# Patient Record
Sex: MALE | Race: BLACK OR AFRICAN AMERICAN | NOT HISPANIC OR LATINO | Employment: OTHER | URBAN - METROPOLITAN AREA
[De-identification: names, ages, dates, MRNs, and addresses within clinical notes are randomized per-mention and may not be internally consistent; named-entity substitution may affect disease eponyms.]

---

## 2017-01-11 RX ORDER — METHOCARBAMOL 500 MG/1
TABLET, FILM COATED ORAL
Qty: 90 TABLET | Refills: 6 | Status: SHIPPED | OUTPATIENT
Start: 2017-01-11 | End: 2023-09-27

## 2017-01-12 ENCOUNTER — OFFICE VISIT (OUTPATIENT)
Dept: PHYSICAL MEDICINE AND REHAB | Facility: CLINIC | Age: 82
End: 2017-01-12
Payer: COMMERCIAL

## 2017-01-12 VITALS
BODY MASS INDEX: 28.04 KG/M2 | HEART RATE: 58 BPM | DIASTOLIC BLOOD PRESSURE: 70 MMHG | WEIGHT: 185 LBS | SYSTOLIC BLOOD PRESSURE: 145 MMHG | HEIGHT: 68 IN

## 2017-01-12 DIAGNOSIS — M47.26 OSTEOARTHRITIS OF SPINE WITH RADICULOPATHY, LUMBAR REGION: Primary | ICD-10-CM

## 2017-01-12 DIAGNOSIS — M47.12 OSTEOARTHRITIS OF CERVICAL SPINE WITH MYELOPATHY: ICD-10-CM

## 2017-01-12 DIAGNOSIS — M70.61 TROCHANTERIC BURSITIS OF RIGHT HIP: ICD-10-CM

## 2017-01-12 PROCEDURE — 20610 DRAIN/INJ JOINT/BURSA W/O US: CPT | Mod: RT,S$GLB,, | Performed by: PHYSICAL MEDICINE & REHABILITATION

## 2017-01-12 PROCEDURE — 1125F AMNT PAIN NOTED PAIN PRSNT: CPT | Mod: S$GLB,,, | Performed by: PHYSICAL MEDICINE & REHABILITATION

## 2017-01-12 PROCEDURE — 99999 PR PBB SHADOW E&M-EST. PATIENT-LVL III: CPT | Mod: PBBFAC,,, | Performed by: PHYSICAL MEDICINE & REHABILITATION

## 2017-01-12 PROCEDURE — 1157F ADVNC CARE PLAN IN RCRD: CPT | Mod: S$GLB,,, | Performed by: PHYSICAL MEDICINE & REHABILITATION

## 2017-01-12 PROCEDURE — 1159F MED LIST DOCD IN RCRD: CPT | Mod: S$GLB,,, | Performed by: PHYSICAL MEDICINE & REHABILITATION

## 2017-01-12 PROCEDURE — 3077F SYST BP >= 140 MM HG: CPT | Mod: S$GLB,,, | Performed by: PHYSICAL MEDICINE & REHABILITATION

## 2017-01-12 PROCEDURE — 1160F RVW MEDS BY RX/DR IN RCRD: CPT | Mod: S$GLB,,, | Performed by: PHYSICAL MEDICINE & REHABILITATION

## 2017-01-12 PROCEDURE — 99214 OFFICE O/P EST MOD 30 MIN: CPT | Mod: 25,S$GLB,, | Performed by: PHYSICAL MEDICINE & REHABILITATION

## 2017-01-12 PROCEDURE — 3078F DIAST BP <80 MM HG: CPT | Mod: S$GLB,,, | Performed by: PHYSICAL MEDICINE & REHABILITATION

## 2017-01-12 RX ORDER — TRIAMCINOLONE ACETONIDE 40 MG/ML
60 INJECTION, SUSPENSION INTRA-ARTICULAR; INTRAMUSCULAR
Status: COMPLETED | OUTPATIENT
Start: 2017-01-12 | End: 2017-01-12

## 2017-01-12 RX ADMIN — TRIAMCINOLONE ACETONIDE 60 MG: 40 INJECTION, SUSPENSION INTRA-ARTICULAR; INTRAMUSCULAR at 02:01

## 2017-01-12 NOTE — MR AVS SNAPSHOT
[facility]  [address]  [facility] [address]  Phone: [phone]                  Lon Chin Jr.   2017 10:00 AM   Office Visit    Description:  Male : 2/15/1933   Provider:  Lui Correa MD   Department:  [facility]           Diagnoses this Visit        Comments    Osteoarthritis of spine with radiculopathy, lumbar region    -  Primary     Osteoarthritis of cervical spine with myelopathy                To Do List           Goals (5 Years of Data)     None      Follow-Up and Disposition     Return in about 6 months (around 2017) for 40 mins.      [facility]     [facility] Care [facility] -  Assistance  Registered nurses in the [facility] provide clinical advisement, health education, appointment booking, and other advisory services.  Call for this free service at [phone].             Medications           Message regarding Medications     Verify the changes and/or additions to your medication regime listed below are the same as discussed with your clinician today.  If any of these changes or additions are incorrect, please notify your healthcare provider.             Verify that the below list of medications is an accurate representation of the medications you are currently taking.  If none reported, the list may be blank. If incorrect, please contact your healthcare provider. Carry this list with you in case of emergency.           Current Medications     acetaminophen-codeine 300-30mg (TYLENOL #3) 300-30 mg Tab Take 1 tablet (30 mg of codeine total) by mouth 3 (three) times daily as needed.    atorvastatin (LIPITOR) 20 MG tablet Take 1 tablet (20 mg total) by mouth once daily.    clopidogrel (PLAVIX) 75 mg tablet Take 75 mg by mouth once daily.    docusate sodium (COLACE) 100 MG capsule Take 1 capsule (100 mg total) by mouth 2 (two) times daily.    gabapentin (NEURONTIN) 300 MG capsule Take 1  "capsule Q AM + 1 Q Day in the early afternoon + 2 Q HS    methocarbamol (ROBAXIN) 500 MG Tab Take 1-2 tablets TID PRN (muscle spasms)    multivitamin capsule Take 1 capsule by mouth once daily.    nebivolol (BYSTOLIC) 10 MG Tab Take 1 tablet (10 mg total) by mouth once daily.    nifedipine (PROCARDIA-XL) 60 MG (OSM) 24 hr tablet Take 1 tablet (60 mg total) by mouth once daily.    olmesartan-hydrochlorothiazide (BENICAR HCT) 40-12.5 mg Tab Take 1 tablet by mouth once daily.    potassium chloride SA (KLOR-CON M20) 20 MEQ tablet Take 1 tablet (20 mEq total) by mouth once daily.    predniSONE (DELTASONE) 10 MG tablet Take 3 per day for 4 days then 2 per day for 4 days then 1 per day for 3 days then 1/2 per day for 4 days.  #25    predniSONE (DELTASONE) 10 MG tablet Take 3 per day for 4 days then 2 per day for 4 days then 1 per day for 3 days then 1/2 per day for 4 days.  #25    saw palmetto 500 MG capsule Take 500 mg by mouth once daily.           Clinical Reference Information           Vital Signs - Last Recorded  Most recent update: 1/12/2017 10:15 AM by Chantelle Mcclellan MA    BP Pulse Ht Wt BMI    (!) 145/70 (!) 58 5' 8" (1.727 m) 83.9 kg (185 lb) 28.13 kg/m2      Blood Pressure          Most Recent Value    BP  (!)  145/70      Allergies as of 1/12/2017     Gabapentin (Bulk)    Prednisone      Immunizations Administered on Date of Encounter - 1/12/2017     None      Orders Placed During Today's Visit     Future Labs/Procedures Expected by Expires    IR SI Joint Injection Bilat w/Image  1/12/2017 1/12/2018      "

## 2017-01-12 NOTE — PROGRESS NOTES
"Subjective:       Patient ID: Lon Chin Jr. is a 83 y.o. male.    Chief Complaint: No chief complaint on file.    HPI Comments: This pleasant 84yo male is followed for:    -- LBP which is increased with prolonged standing and walking distances.  Very stiff in AM.  Left thigh goes numb intermittently involving the anterior and lateral thigh -- wakens him from sleep.  He stts this is a longstanding problem and that in the past he has had some relief for a few weeks with a steroid injection IM done by this PCP.  A Medrol dosepak did not provide much relief.  He has a h/o stomach ulcers taking prednisone.   He had a left hip injxn done 12/30/13 which substantially relieved the left thigh numbness.  He is s/p L4-5 fusion in 1997.  He takes Tylenol #3 usually 2 times per day, sometimes TID, rx by his PCP.  He was sent to OP PT which did not help his pain.  He was referred for bilateral L4-5 ESIs done on 4/2/14 which did not relieve his pain at all.  He was given a rx for a prednisone taper on 9/2/14 which did help the LBP and was also given a rx for MS Contin 30mg BID which he feels has been a big help with the pain.  He is taking it only PRN (#60 lasts about 3 months) but still takes Tylenol #3 most days.      He had an MRI done 7/10/14 which showed mild stenosis & moderate left foraminal stenosis at L2-3, moderate bilateral foraminal stenosis at L3-4 and Grade 1 anterolisthesis of L4 on L5.       -- R CVA on 4/1.  He has done very well in recovery and ambulates using a SC.      -- neck pain which is longstanding but which he considers 2/2 the LBP.      -- left hip bursitis.  The pt received an injxn on 9/2/14 which resolved the pain.          Today the pt's CC is "my back and my hip".   He stts the right SI injxn done 6/21/16 did provide some relief of his pain for awhile.  He is c/o right side LBP (points lateral to L5-S1 facet).  He stts at times (every day) the pain radiates up/down his spine.  He has pain with " "going from sitting to standing and particularly with standing for prolonged periods and with walking distances.  He does get some relief with Tylenol #3.  On days when his pain is increased he gets relief with MS Contin 30mg which he takes very infrequently due to concern about dependence.  He continues to c/o limited endurance with walking associated with "dragging my left leg" at times.  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  He also walks to the mailbox and walks down his block but he stopped walking 2 blocks because he was afraid of the left leg weakness.                                              Review of Systems   Constitutional: Negative for appetite change and fatigue.   Eyes: Negative for visual disturbance.   Respiratory: Negative for shortness of breath.    Cardiovascular: Negative for chest pain.   Gastrointestinal: Negative for constipation and diarrhea.   Genitourinary: Negative for dysuria, frequency and urgency.   Musculoskeletal: Positive for back pain, gait problem and neck pain. Negative for arthralgias, joint swelling, myalgias and neck stiffness.   Neurological: Negative for dizziness, tremors, weakness, numbness and headaches.   Psychiatric/Behavioral: Negative for dysphoric mood.   All other systems reviewed and are negative.      Objective:      Physical Exam   Musculoskeletal: Normal range of motion.   Neurological: He has normal strength.   Skin: No rash noted.       Assessment:       1. Osteoarthritis of spine with radiculopathy, lumbar region -- the pt did get some relief of his LBP with the SI injxn so I have referred him back to IR for bilateral SI injxns which I hope will provide further relief.  He may continue the above regimen.  He is using a RW and I agree with that.     2. Osteoarthritis of cervical spine with myelopathy -- stable   3. Trochanteric bursitis of right hip -- I have offered an injxn and he has " accepted.  Hip Injection:    The potential risks were explained and the patient consented to proceed.  After identifying the right side the patient was placed in a left side-lying position.  Using palpation and PROM the greater trochanter was identified.  A 25G x 1.5in needle was introduced to the greater trochanter and 3cc 0.5% Sensorcaine was deposited followed by 1.5cc (60mg) Kenalog.  There were no complications.  The patient reports partial relief of the pain.      The pt does not report significant relief of his hip pain.  I suspect this may be related to his lumbar spine dz.  I asked him to perform right piriformis stretches (he is familiar with this and does it on the left side).           Plan:       RTC 6 mos 40 mins.  More than 25 mins was spent with the patient with more than half that time used to discuss the pt's diagnoses, interventions and treatment plan.

## 2017-01-18 ENCOUNTER — TELEPHONE (OUTPATIENT)
Dept: INTERVENTIONAL RADIOLOGY/VASCULAR | Facility: HOSPITAL | Age: 82
End: 2017-01-18

## 2017-01-18 NOTE — TELEPHONE ENCOUNTER
Phone call (message x2 )   Arrival time-    1030      Allergies reviewed  Directions given  Instructed to take meds in AM  Has Ride (instructed )                        Anti coags (plavix held? )

## 2017-01-19 ENCOUNTER — HOSPITAL ENCOUNTER (OUTPATIENT)
Dept: INTERVENTIONAL RADIOLOGY/VASCULAR | Facility: HOSPITAL | Age: 82
Discharge: HOME | End: 2017-01-19
Attending: PHYSICAL MEDICINE & REHABILITATION
Payer: COMMERCIAL

## 2017-01-19 VITALS — OXYGEN SATURATION: 99 % | DIASTOLIC BLOOD PRESSURE: 65 MMHG | SYSTOLIC BLOOD PRESSURE: 139 MMHG | HEART RATE: 58 BPM

## 2017-01-19 DIAGNOSIS — M47.26 OSTEOARTHRITIS OF SPINE WITH RADICULOPATHY, LUMBAR REGION: ICD-10-CM

## 2017-01-19 PROCEDURE — 27096 INJECT SACROILIAC JOINT: CPT | Mod: 50,,, | Performed by: RADIOLOGY

## 2017-01-19 PROCEDURE — 27200940 IR SI JOINT INJECTION BILAT W/IMAGE

## 2017-01-19 NOTE — PROCEDURES
Radiology Post-Procedure Note    Pre Op Diagnosis: LBP    Post Op Diagnosis: Same    Procedure: SI joint injection bilaterally    Procedure performed by: Amna     Written Informed Consent Obtained: Yes    Specimen Removed: NO    Estimated Blood Loss: Minimal    Findings: Successful SI joint injection bilaterally    Level injected: SI joint injection bilaterally  Needle used: 22 gauge  Dose:  80 mg Depo-methylprednisolone   2 mL Lidocaine 1% MPF    Patient tolerated procedure well.    SHAUNA FREY  PGY-II Radiology Resident  [facility] [facility]  Pager: [phone]

## 2017-01-19 NOTE — PROGRESS NOTES
Patient ambulated to room 188 for SI joint injection, AAO, no s/s of distress, Dr. Cano at the bedside for consent.

## 2017-01-19 NOTE — IP AVS SNAPSHOT
[facility]  [address]  [facility] [address]  Phone: [phone]           Patient Discharge Instructions     Our goal is to set you up for success. This packet includes information on your condition, medications, and your home care. It will help you to care for yourself so you don't get sicker and need to go back to the hospital.     Please ask your nurse if you have any questions.        There are many details to remember when preparing to leave the hospital. Here is what you will need to do:    1. Take your medicine. If you are prescribed medications, review your Medication List in the following pages. You may have new medications to  at the pharmacy and others that you'll need to stop taking. Review the instructions for how and when to take your medications. Talk with your doctor or nurses if you are unsure of what to do.     2. Go to your follow-up appointments. Specific follow-up information is listed in the following pages. Your may be contacted by a transition nurse or clinical provider about future appointments. Be sure we have all of the phone numbers to reach you, if needed. Please contact your provider's office if you are unable to make an appointment.     3. Watch for warning signs. Your doctor or nurse will give you detailed warning signs to watch for and when to call for assistance. These instructions may also include educational information about your condition. If you experience any of warning signs to your health, call your doctor.               [facility]  Unless otherwise directed by your provider, please contact [facility], our nurse care line that is available for 24/7 assistance.     [phone] (toll-free)    Registered nurses in the [facility] provide clinical advisement, health education, appointment booking, and other advisory services.                    ** Verify the list of medication(s) below is accurate and up  to date. Carry this with you in case of emergency. If your medications have changed, please notify your healthcare provider.             Medication List      TAKE these medications        Additional Info                      acetaminophen-codeine 300-30mg 300-30 mg Tab   Commonly known as:  TYLENOL #3   Quantity:  90 tablet   Refills:  2   Dose:  30 mg of codeine    Instructions:  Take 1 tablet (30 mg of codeine total) by mouth 3 (three) times daily as needed.     Begin Date    AM    Noon    PM    Bedtime       atorvastatin 20 MG tablet   Commonly known as:  LIPITOR   Quantity:  30 tablet   Refills:  1   Dose:  20 mg    Instructions:  Take 1 tablet (20 mg total) by mouth once daily.     Begin Date    AM    Noon    PM    Bedtime       clopidogrel 75 mg tablet   Commonly known as:  PLAVIX   Refills:  0   Dose:  75 mg    Instructions:  Take 75 mg by mouth once daily.     Begin Date    AM    Noon    PM    Bedtime       docusate sodium 100 MG capsule   Commonly known as:  COLACE   Quantity:  60 capsule   Refills:  2   Dose:  100 mg    Instructions:  Take 1 capsule (100 mg total) by mouth 2 (two) times daily.     Begin Date    AM    Noon    PM    Bedtime       gabapentin 300 MG capsule   Commonly known as:  NEURONTIN   Quantity:  120 capsule   Refills:  6    Instructions:  Take 1 capsule Q AM + 1 Q Day in the early afternoon + 2 Q HS     Begin Date    AM    Noon    PM    Bedtime       methocarbamol 500 MG Tab   Commonly known as:  ROBAXIN   Quantity:  90 tablet   Refills:  6    Instructions:  Take 1-2 tablets TID PRN (muscle spasms)     Begin Date    AM    Noon    PM    Bedtime       multivitamin capsule   Refills:  0   Dose:  1 capsule    Instructions:  Take 1 capsule by mouth once daily.     Begin Date    AM    Noon    PM    Bedtime       nebivolol 10 MG Tab   Commonly known as:  BYSTOLIC   Quantity:  30 tablet   Refills:  11   Dose:  10 mg    Instructions:  Take 1 tablet (10 mg total) by mouth once daily.     Begin  Date    AM    Noon    PM    Bedtime       nifedipine 60 MG (OSM) 24 hr tablet   Commonly known as:  PROCARDIA-XL   Quantity:  30 tablet   Refills:  11   Dose:  60 mg    Instructions:  Take 1 tablet (60 mg total) by mouth once daily.     Begin Date    AM    Noon    PM    Bedtime       olmesartan-hydrochlorothiazide 40-12.5 mg Tab   Commonly known as:  BENICAR HCT   Refills:  0   Dose:  1 tablet    Instructions:  Take 1 tablet by mouth once daily.     Begin Date    AM    Noon    PM    Bedtime       potassium chloride SA 20 MEQ tablet   Commonly known as:  KLOR-CON M20   Quantity:  30 tablet   Refills:  11   Dose:  20 mEq    Instructions:  Take 1 tablet (20 mEq total) by mouth once daily.     Begin Date    AM    Noon    PM    Bedtime       * predniSONE 10 MG tablet   Commonly known as:  DELTASONE   Quantity:  25 tablet   Refills:  0    Instructions:  Take 3 per day for 4 days then 2 per day for 4 days then 1 per day for 3 days then 1/2 per day for 4 days.  #25     Begin Date    AM    Noon    PM    Bedtime       * predniSONE 10 MG tablet   Commonly known as:  DELTASONE   Quantity:  25 tablet   Refills:  0    Instructions:  Take 3 per day for 4 days then 2 per day for 4 days then 1 per day for 3 days then 1/2 per day for 4 days.  #25     Begin Date    AM    Noon    PM    Bedtime       saw palmetto 500 MG capsule   Refills:  0   Dose:  500 mg    Instructions:  Take 500 mg by mouth once daily.     Begin Date    AM    Noon    PM    Bedtime       * Notice:  This list has 2 medication(s) that are the same as other medications prescribed for you. Read the directions carefully, and ask your doctor or other care provider to review them with you.               Please bring to all follow up appointments:    1. A copy of your discharge instructions.  2. All medicines you are currently taking in their original bottles.  3. Identification and insurance card.    Please arrive 15 minutes ahead of scheduled appointment time.    Please  call 24 hours in advance if you must reschedule your appointment and/or time.          Discharge References/Attachments     INJECTION, LUMBAR EPIDURAL: RECOVERY AT HOME (ENGLISH)        Admission Information     Date & Time Provider Department CSN    1/19/2017 10:30 AM Lui Correa MD [facility] [id number]      Care Providers     Provider Role Specialty Primary office phone    Lui Correa MD Attending Provider [facility] [phone]      Your Vitals Were     BP Pulse SpO2             139/65 58 99%         Recent Lab Values        4/1/2013                           7:02 AM           A1C 5.9                       Allergies as of 1/19/2017        Reactions    Gabapentin (Bulk) Other (See Comments)    Severe constipation    Prednisone       Advance Directives     An advance directive is a document which, in the event you are no longer able to make decisions for yourself, tells your healthcare team what kind of treatment you do or do not want to receive, or who you would like to make those decisions for you.  If you do not currently have an advance directive, [facility] encourages you to create one.  For more information call:  [phone] ([phone]), [phone] ([phone]),  or log on to [email or web address].        Smoking Cessation     If you would like to quit smoking:   You may be eligible for free services if you are a [place] resident and started smoking cigarettes before September 1, 1988.  Call the Smoking Cessation Trust (SCT) toll free at (249) 477-8666 or (767) 988-1876.   Call 1-800-QUIT-NOW if you do not meet the above criteria.            Language Assistance Services     ATTENTION: Language assistance services are available, free of charge. Please call [phone].      ATENCIÓN: Si habla español, tiene a quan disposición servicios gratuitos de asistencia lingüística. Llame al [phone].     CHÚ Ý: N?u b?n nói Ti?ng Vi?t, có  các d?ch v? h? tr? ngôn ng? mi?n phí dành cho b?n. G?i s? [phone].        Stroke Education               [facility] complies with applicable Federal civil rights laws and does not discriminate on the basis of race, color, national origin, age, disability, or sex.

## 2017-01-19 NOTE — H&P
Radiology History & Physical      SUBJECTIVE:     Chief Complaint: Back pain and LE pain.     History of Present Illness:  Lon Chin Jr. is a 83 y.o. male who presents for SI joint injection bilaterally.    Past Medical History   Diagnosis Date    Arthritis      In back, neck    Bell's palsy feb or march 2013    Blood clot in vein      right leg after back surgery    Cataract associated with other syndromes     GERD (gastroesophageal reflux disease)     Hypertension     Other and unspecified hyperlipidemia     Stroke     TIA (transient ischemic attack)      Feb or march 2013     Past Surgical History   Procedure Laterality Date    Back surgery      Appendectomy      Prostate surgery         Home Meds:   Prior to Admission medications    Medication Sig Start Date End Date Taking? Authorizing Provider   acetaminophen-codeine 300-30mg (TYLENOL #3) 300-30 mg Tab Take 1 tablet (30 mg of codeine total) by mouth 3 (three) times daily as needed. 9/23/16   Lui Correa MD   atorvastatin (LIPITOR) 20 MG tablet Take 1 tablet (20 mg total) by mouth once daily. 7/2/14 7/2/15  Lui Correa MD   clopidogrel (PLAVIX) 75 mg tablet Take 75 mg by mouth once daily.    Historical Provider, MD   docusate sodium (COLACE) 100 MG capsule Take 1 capsule (100 mg total) by mouth 2 (two) times daily. 4/12/13   Lui Correa MD   gabapentin (NEURONTIN) 300 MG capsule Take 1 capsule Q AM + 1 Q Day in the early afternoon + 2 Q HS 12/10/14   Lui Correa MD   methocarbamol (ROBAXIN) 500 MG Tab Take 1-2 tablets TID PRN (muscle spasms) 1/11/17   Lui Correa MD   multivitamin capsule Take 1 capsule by mouth once daily.    Historical Provider, MD   nebivolol (BYSTOLIC) 10 MG Tab Take 1 tablet (10 mg total) by mouth once daily. 8/29/13   Tito Reyes MD   nifedipine (PROCARDIA-XL) 60 MG (OSM) 24 hr tablet Take 1 tablet (60 mg total) by mouth once daily. 7/15/13 7/15/14  Tito Reyes MD    olmesartan-hydrochlorothiazide (BENICAR HCT) 40-12.5 mg Tab Take 1 tablet by mouth once daily.    Historical Provider, MD   potassium chloride SA (KLOR-CON M20) 20 MEQ tablet Take 1 tablet (20 mEq total) by mouth once daily. 8/29/13   Tito Reyes MD   predniSONE (DELTASONE) 10 MG tablet Take 3 per day for 4 days then 2 per day for 4 days then 1 per day for 3 days then 1/2 per day for 4 days.  #25 10/30/13   Lui Correa MD   predniSONE (DELTASONE) 10 MG tablet Take 3 per day for 4 days then 2 per day for 4 days then 1 per day for 3 days then 1/2 per day for 4 days.  #25 9/2/14   Lui Correa MD   saw palmetto 500 MG capsule Take 500 mg by mouth once daily.    Historical Provider, MD     Anticoagulants/Antiplatelets: plavix 6 days ago    Allergies:   Review of patient's allergies indicates:   Allergen Reactions    Gabapentin (bulk) Other (See Comments)     Severe constipation    Prednisone      Sedation History:  no adverse reactions    Review of Systems:   Hematological: no known coagulopathies  Respiratory: no shortness of breath  Cardiovascular: no chest pain  Gastrointestinal: no abdominal pain  Genito-Urinary: no dysuria  Musculoskeletal: negative  Neurological: no TIA or stroke symptoms         OBJECTIVE:     Vital Signs (Most Recent)       Physical Exam:  ASA: 2  Mallampati: 2    General: no acute distress  Mental Status: alert and oriented to person, place and time  HEENT: normocephalic, atraumatic  Chest: unlabored breathing  Heart: regular heart rate  Abdomen: nondistended  Extremity: moves all extremities    Laboratory  Lab Results   Component Value Date    INR 1.2 04/01/2013       Lab Results   Component Value Date    WBC 6.38 04/04/2013    HGB 12.5 (L) 04/04/2013    HCT 37.9 (L) 04/04/2013    MCV 85.9 04/04/2013     04/04/2013      Lab Results   Component Value Date    GLU 95 04/04/2013     04/04/2013    K 3.4 (L) 04/04/2013     04/04/2013    CO2 22 (L) 04/04/2013     BUN 12 04/04/2013    CREATININE 0.8 04/04/2013    CALCIUM 9.0 04/04/2013    MG 2.0 04/02/2013    ALT 43 08/21/2013    AST 36 08/21/2013    ALBUMIN 3.3 (L) 04/01/2013    BILITOT 0.4 04/01/2013       ASSESSMENT/PLAN:     Sedation Plan: Local  Patient will undergo SI joint injection bilaterally.    SHAUNA FREY  PGY-II Radiology Resident  [address] [facility]  Pager: [phone]

## 2017-02-25 ENCOUNTER — HOSPITAL ENCOUNTER (EMERGENCY)
Facility: HOSPITAL | Age: 82
Discharge: HOME | End: 2017-02-26
Attending: EMERGENCY MEDICINE
Payer: COMMERCIAL

## 2017-02-25 DIAGNOSIS — G45.9 TRANSIENT CEREBRAL ISCHEMIA, UNSPECIFIED TYPE: Primary | ICD-10-CM

## 2017-02-25 DIAGNOSIS — R53.81 MALAISE: ICD-10-CM

## 2017-02-25 PROCEDURE — 93005 ELECTROCARDIOGRAM TRACING: CPT

## 2017-02-25 PROCEDURE — 99285 EMERGENCY DEPT VISIT HI MDM: CPT | Mod: 25

## 2017-02-25 PROCEDURE — 93010 ELECTROCARDIOGRAM REPORT: CPT | Mod: ,,, | Performed by: INTERNAL MEDICINE

## 2017-02-25 NOTE — ED AVS SNAPSHOT
[facility] [address] [place] [address]               Lon Chin JrRobbie   2017 11:17 PM   ED    Description:  Male : 2/15/1933   Department:  [facility]           Your Care was Coordinated By:     Provider Role From To    Eugenio Doshi MD Attending Provider 17 0314 --      Reason for Visit     Dizziness           Diagnoses this Visit        Comments    Transient cerebral ischemia, unspecified type    -  Primary     Malaise           ED Disposition     None           To Do List           Follow-up Information     Follow up with Norman Rose MD In 1 day.    Specialty:  Family Medicine    Contact information:    [address]  [place] [address] [phone]          Follow up with Kurt Davis Jr, MD In 1 day.    Specialty:  Neurology    Contact information:    [address] [facility]  [address]  [facility] [address]  [phone]        [facility]     [facility] -  Assistance  Registered nurses in the [facility] provide clinical advisement, health education, appointment booking, and other advisory services.  Call for this free service at [phone].             Medications           Message regarding Medications     Verify the changes and/or additions to your medication regime listed below are the same as discussed with your clinician today.  If any of these changes or additions are incorrect, please notify your healthcare provider.             Verify that the below list of medications is an accurate representation of the medications you are currently taking.  If none reported, the list may be blank. If incorrect, please contact your healthcare provider. Carry this list with you in case of emergency.           Current Medications     acetaminophen-codeine 300-30mg (TYLENOL #3) 300-30 mg Tab Take 1 tablet (30 mg of codeine total) by mouth 3 (three) times daily as needed.     atorvastatin (LIPITOR) 20 MG tablet Take 1 tablet (20 mg total) by mouth once daily.    clopidogrel (PLAVIX) 75 mg tablet Take 75 mg by mouth once daily.    docusate sodium (COLACE) 100 MG capsule Take 1 capsule (100 mg total) by mouth 2 (two) times daily.    gabapentin (NEURONTIN) 300 MG capsule Take 1 capsule Q AM + 1 Q Day in the early afternoon + 2 Q HS    methocarbamol (ROBAXIN) 500 MG Tab Take 1-2 tablets TID PRN (muscle spasms)    multivitamin capsule Take 1 capsule by mouth once daily.    nebivolol (BYSTOLIC) 10 MG Tab Take 1 tablet (10 mg total) by mouth once daily.    nifedipine (PROCARDIA-XL) 60 MG (OSM) 24 hr tablet Take 1 tablet (60 mg total) by mouth once daily.    olmesartan-hydrochlorothiazide (BENICAR HCT) 40-12.5 mg Tab Take 1 tablet by mouth once daily.    potassium chloride SA (KLOR-CON M20) 20 MEQ tablet Take 1 tablet (20 mEq total) by mouth once daily.    predniSONE (DELTASONE) 10 MG tablet Take 3 per day for 4 days then 2 per day for 4 days then 1 per day for 3 days then 1/2 per day for 4 days.  #25    predniSONE (DELTASONE) 10 MG tablet Take 3 per day for 4 days then 2 per day for 4 days then 1 per day for 3 days then 1/2 per day for 4 days.  #25    saw palmetto 500 MG capsule Take 500 mg by mouth once daily.           Clinical Reference Information           Your Vitals Were     BP                   106/48 (BP Location: Right arm, Patient Position: Sitting, BP Method: Automatic)           Allergies as of 2/26/2017        Reactions    Gabapentin (Bulk) Other (See Comments)    Severe constipation    Prednisone       Immunizations Administered on Date of Encounter - 2/26/2017     None      ED Micro, Lab, POCT     Start Ordered       Status Ordering Provider    02/26/17 0334 02/26/17 0333  Troponin I  Add-on      Completed     02/26/17 0012 02/26/17 0011  CBC auto differential  STAT      Final result     02/26/17 0012 02/26/17 0011  Comprehensive metabolic panel  STAT      Final result      02/26/17 0011 02/26/17 0011  Troponin I  Once      Final result       ED Imaging Orders     Start Ordered       Status Ordering Provider    02/26/17 0334 02/26/17 0333  X-Ray Chest 1 View  1 time imaging      Final result     02/26/17 0332 02/26/17 0333  CT Head Without Contrast  1 time imaging      Final result       Discharge References/Attachments     TIA: TRANSIENT ISCHEMIC ATTACK (ENGLISH)      Smoking Cessation     If you would like to quit smoking:   You may be eligible for free services if you are a [place] resident and started smoking cigarettes before September 1, 1988.  Call the [employer] ([employer]) toll free at (034) 910-3767 or (172) 027-8835.   Call 8-090-QUIT-NOW if you do not meet the above criteria.             [facility] complies with applicable Federal civil rights laws and does not discriminate on the basis of race, color, national origin, age, disability, or sex.        Language Assistance Services     ATTENTION: Language assistance services are available, free of charge. Please call [phone].      ATENCIÓN: Si habla español, tiene a quan disposición servicios gratuitos de asistencia lingüística. Llame al [phone].     CHÚ Ý: N?u b?n nói Ti?ng Vi?t, có các d?ch v? h? tr? ngôn ng? mi?n phí dành cho b?n. G?i s? [phone].

## 2017-02-26 VITALS
DIASTOLIC BLOOD PRESSURE: 48 MMHG | OXYGEN SATURATION: 96 % | SYSTOLIC BLOOD PRESSURE: 106 MMHG | RESPIRATION RATE: 15 BRPM | TEMPERATURE: 98 F | HEART RATE: 52 BPM

## 2017-02-26 LAB
ALBUMIN SERPL BCP-MCNC: 3.8 G/DL
ALP SERPL-CCNC: 57 U/L
ALT SERPL W/O P-5'-P-CCNC: 49 U/L
ANION GAP SERPL CALC-SCNC: 12 MMOL/L
AST SERPL-CCNC: 63 U/L
BASOPHILS # BLD AUTO: 0.01 K/UL
BASOPHILS NFR BLD: 0.2 %
BILIRUB SERPL-MCNC: 0.2 MG/DL
BUN SERPL-MCNC: 24 MG/DL
CALCIUM SERPL-MCNC: 9.1 MG/DL
CHLORIDE SERPL-SCNC: 102 MMOL/L
CO2 SERPL-SCNC: 23 MMOL/L
CREAT SERPL-MCNC: 1.2 MG/DL
DIFFERENTIAL METHOD: ABNORMAL
EOSINOPHIL # BLD AUTO: 0.1 K/UL
EOSINOPHIL NFR BLD: 1.6 %
ERYTHROCYTE [DISTWIDTH] IN BLOOD BY AUTOMATED COUNT: 15.5 %
EST. GFR  (AFRICAN AMERICAN): >60 ML/MIN/1.73 M^2
EST. GFR  (NON AFRICAN AMERICAN): 55 ML/MIN/1.73 M^2
GLUCOSE SERPL-MCNC: 115 MG/DL
HCT VFR BLD AUTO: 36.7 %
HGB BLD-MCNC: 12.1 G/DL
LYMPHOCYTES # BLD AUTO: 1.3 K/UL
LYMPHOCYTES NFR BLD: 28.2 %
MCH RBC QN AUTO: 29.5 PG
MCHC RBC AUTO-ENTMCNC: 33 %
MCV RBC AUTO: 90 FL
MONOCYTES # BLD AUTO: 0.4 K/UL
MONOCYTES NFR BLD: 9.8 %
NEUTROPHILS # BLD AUTO: 2.7 K/UL
NEUTROPHILS NFR BLD: 60 %
PLATELET # BLD AUTO: 165 K/UL
PMV BLD AUTO: 10.2 FL
POTASSIUM SERPL-SCNC: 5.3 MMOL/L
PROT SERPL-MCNC: 7.6 G/DL
RBC # BLD AUTO: 4.1 M/UL
SODIUM SERPL-SCNC: 137 MMOL/L
TROPONIN I SERPL DL<=0.01 NG/ML-MCNC: 0.02 NG/ML
WBC # BLD AUTO: 4.5 K/UL

## 2017-02-26 PROCEDURE — 85025 COMPLETE CBC W/AUTO DIFF WBC: CPT

## 2017-02-26 PROCEDURE — 84484 ASSAY OF TROPONIN QUANT: CPT

## 2017-02-26 PROCEDURE — 80053 COMPREHEN METABOLIC PANEL: CPT

## 2017-02-26 NOTE — ED NOTES
"Pt presents to ED from home via EMS. Pt reports "feeling dizzy/ weak on the left side of the body." Pt reports that he had a previous stroke, which impaired the left side of his body. Pt reports that he is at his baseline at this time. Pt has no extremity weakness noted. Pt has uneven smile, left side does not raise. Pt's son reports this is baseline, due to previous stroke and hx of bell's palsy. Pt reports that "the strange feeling" traveled all along the left side of his body, leaving him with a headache that is on the left side of his body. Pt has Patient on cardiac monitor, automatic blood pressure cuff and pulse oximeter.     "

## 2017-02-26 NOTE — ED PROVIDER NOTES
Encounter Date: 2/25/2017       History     Chief Complaint   Patient presents with    Dizziness     awoke with headache/dizziness and altered asensation on lt. side. hx of lt. sided deficit from previous cva. accucheck-109 per ems     Review of patient's allergies indicates:   Allergen Reactions    Gabapentin (bulk) Other (See Comments)     Severe constipation    Prednisone      HPI Comments: Pt is a 85 yo man with h/o CVA 3 yr ago w residual left sided weakness. At 9 pm tonight he suddenly felt left frontal HA and weakness in his left arm,left leg, blurred vision of the left eye. Symptoms resolved after 2 hrs.     The history is provided by the patient.     Past Medical History:   Diagnosis Date    Arthritis     In back, neck    Bell's palsy feb or march 2013    Blood clot in vein     right leg after back surgery    Cataract associated with other syndromes     GERD (gastroesophageal reflux disease)     Hypertension     Other and unspecified hyperlipidemia     Stroke     TIA (transient ischemic attack)     Feb or march 2013     Past Surgical History:   Procedure Laterality Date    APPENDECTOMY      BACK SURGERY      PROSTATE SURGERY       Family History   Problem Relation Age of Onset    Stroke Father     Hypertension Son      Social History   Substance Use Topics    Smoking status: Former Smoker     Quit date: 1/1/1955    Smokeless tobacco: Not on file    Alcohol use No     Review of Systems   Constitutional: Negative for fever.   HENT: Negative for sore throat.    Respiratory: Negative for shortness of breath.    Cardiovascular: Negative for chest pain.   Gastrointestinal: Negative for nausea.   Genitourinary: Negative for dysuria.   Musculoskeletal: Negative for back pain, myalgias, neck pain and neck stiffness.   Skin: Negative for rash.   Neurological: Negative for weakness.   Hematological: Does not bruise/bleed easily.   All other systems reviewed and are negative.      Physical Exam    Initial Vitals   BP Pulse Resp Temp SpO2   02/26/17 0023 02/26/17 0023 02/26/17 0023 02/26/17 0151 02/26/17 0023   107/48 64 16 97.7 °F (36.5 °C) 98 %     Physical Exam    Nursing note and vitals reviewed.  Constitutional: Vital signs are normal. He appears well-developed and well-nourished. No distress.   HENT:   Head: Normocephalic and atraumatic.   Eyes: EOM are normal. Pupils are equal, round, and reactive to light.   Neck: Normal range of motion. Neck supple.   Cardiovascular: Normal rate and regular rhythm.   Pulmonary/Chest: Breath sounds normal. No respiratory distress. He has no wheezes. He has no rhonchi. He has no rales. He exhibits no tenderness.   Abdominal: Soft. He exhibits no distension. There is no tenderness. There is no rebound and no guarding.   Musculoskeletal: Normal range of motion. He exhibits no tenderness.   Neurological: He is alert and oriented to person, place, and time. No cranial nerve deficit.   Skin: Skin is warm and dry.   Psychiatric: He has a normal mood and affect.         ED Course   Procedures  Labs Reviewed   CBC W/ AUTO DIFFERENTIAL - Abnormal; Notable for the following:        Result Value    RBC 4.10 (*)     Hemoglobin 12.1 (*)     Hematocrit 36.7 (*)     RDW 15.5 (*)     All other components within normal limits   COMPREHENSIVE METABOLIC PANEL - Abnormal; Notable for the following:     Potassium 5.3 (*)     Glucose 115 (*)     BUN, Bld 24 (*)     AST 63 (*)     ALT 49 (*)     eGFR if non  55 (*)     All other components within normal limits   TROPONIN I     EKG Readings: (Independently Interpreted)   Initial Reading: No STEMI. Rhythm: Normal Sinus Rhythm. Heart Rate: 60. Ectopy: No Ectopy. Conduction: Normal. ST Segments: Normal ST Segments. T Waves: Normal. Axis: Normal.              Imaging Results         CT Head Without Contrast (Final result) Result time:  02/26/17 03:50:13    Final result by Ibrahima Guevara MD (02/26/17 03:50:13)    Impression:         No acute intracranial abnormalities.    Stable sequela of remote infarct involving the anterior limb of left internal capsule and right basal ganglia.            Electronically signed by: JOSIE BASSETT MD  Date:     02/26/17  Time:    03:50     Narrative:    Comparison: MRI of 4/1/2013    Clinical history: Left sided weakness    Technique:    Axial images of the brain were obtained at 5-mm intervals from the skull base to the vertex without the administration of contrast.    Findings:    There is generalized cerebral volume loss.  There is hypoattenuation in a periventricular fashion, likely sequela of chronic microvascular ischemic change.There are remote infarctions involving the anterior limb of left internal capsule and right basal ganglia.  There is no evidence of acute major vascular territory infarct, hemorrhage, or mass.  There is no hydrocephalus.  There are no abnormal extra-axial fluid collections.  The paranasal sinuses and mastoid air cells are clear, and there is no evidence of calvarial fracture.  The visualized soft tissues are unremarkable.            X-Ray Chest 1 View (Final result) Result time:  02/26/17 03:51:21    Final result by Min Love MD (02/26/17 03:51:21)    Impression:        No acute radiographic findings in the chest.      Electronically signed by: MIN LOVE MD  Date:     02/26/17  Time:    03:51     Narrative:    Technique: AP chest radiograph.    Comparison: Radiograph 04/01/2013.    Findings:    Mediastinal structures are midline.  There is calcification in the aortic arch.  Cardiac silhouette is normal in size.  Lung volumes are normal and symmetric.  No focal pulmonary parenchymal consolidation.  No pneumothorax or pleural effusions.  No free air beneath the diaphragm.  No acute osseous abnormalities.  Degenerative changes of the glenohumeral and AC joints noted.                        Attending Attestation:             Attending ED Notes:   Extensive  discussion with patient with symptoms of TIA. I strongly recommended admission for TIA workup. Pt refuses admission and wants to be dc'd home to f/u as outpt. Risks discussed with and understood by pt.           ED Course     Clinical Impression:   The primary encounter diagnosis was Transient cerebral ischemia, unspecified type. A diagnosis of Malaise was also pertinent to this visit.    Disposition:   Disposition: Discharged  Condition: Stable       Eugenio Doshi MD  02/27/17 [id number]

## 2019-10-29 ENCOUNTER — HOSPITAL ENCOUNTER (EMERGENCY)
Facility: HOSPITAL | Age: 84
Discharge: HOME | End: 2019-10-29
Attending: EMERGENCY MEDICINE
Payer: COMMERCIAL

## 2019-10-29 VITALS
HEART RATE: 62 BPM | OXYGEN SATURATION: 98 % | WEIGHT: 202 LBS | HEIGHT: 67 IN | SYSTOLIC BLOOD PRESSURE: 142 MMHG | DIASTOLIC BLOOD PRESSURE: 85 MMHG | RESPIRATION RATE: 18 BRPM | BODY MASS INDEX: 31.71 KG/M2 | TEMPERATURE: 98 F
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
## 2019-10-29 DIAGNOSIS — R07.9 CHEST PAIN: ICD-10-CM

## 2019-10-29 DIAGNOSIS — R07.89 CHEST WALL PAIN: Primary | ICD-10-CM

## 2019-10-29 LAB
ALBUMIN SERPL BCP-MCNC: 4.3 G/DL (ref 3.5–5.2)
ALP SERPL-CCNC: 58 U/L (ref 38–126)
ALT SERPL W/O P-5'-P-CCNC: 34 U/L (ref 10–44)
ANION GAP SERPL CALC-SCNC: 9 MMOL/L (ref 8–16)
AST SERPL-CCNC: 56 U/L (ref 15–46)
BASOPHILS # BLD AUTO: 0.01 K/UL (ref 0–0.2)
BASOPHILS NFR BLD: 0.2 % (ref 0–1.9)
BILIRUB SERPL-MCNC: 0.3 MG/DL (ref 0.1–1)
BUN SERPL-MCNC: 21 MG/DL (ref 2–20)
CALCIUM SERPL-MCNC: 9.2 MG/DL (ref 8.7–10.5)
CHLORIDE SERPL-SCNC: 104 MMOL/L (ref 95–110)
CO2 SERPL-SCNC: 27 MMOL/L (ref 23–29)
CREAT SERPL-MCNC: 0.94 MG/DL (ref 0.5–1.4)
DIFFERENTIAL METHOD: ABNORMAL
EOSINOPHIL # BLD AUTO: 0.1 K/UL (ref 0–0.5)
EOSINOPHIL NFR BLD: 2.6 % (ref 0–8)
ERYTHROCYTE [DISTWIDTH] IN BLOOD BY AUTOMATED COUNT: 14.7 % (ref 11.5–14.5)
EST. GFR  (AFRICAN AMERICAN): >60 ML/MIN/1.73 M^2
EST. GFR  (NON AFRICAN AMERICAN): >60 ML/MIN/1.73 M^2
GLUCOSE SERPL-MCNC: 96 MG/DL (ref 70–110)
HCT VFR BLD AUTO: 39.6 % (ref 40–54)
HGB BLD-MCNC: 12.7 G/DL (ref 14–18)
LYMPHOCYTES # BLD AUTO: 1.2 K/UL (ref 1–4.8)
LYMPHOCYTES NFR BLD: 26.2 % (ref 18–48)
MCH RBC QN AUTO: 28.8 PG (ref 27–31)
MCHC RBC AUTO-ENTMCNC: 32.1 G/DL (ref 32–36)
MCV RBC AUTO: 90 FL (ref 82–98)
MONOCYTES # BLD AUTO: 0.7 K/UL (ref 0.3–1)
MONOCYTES NFR BLD: 14.6 % (ref 4–15)
NEUTROPHILS # BLD AUTO: 2.6 K/UL (ref 1.8–7.7)
NEUTROPHILS NFR BLD: 56.4 % (ref 38–73)
NT-PROBNP: 35 PG/ML (ref 5–1800)
PLATELET # BLD AUTO: 199 K/UL (ref 150–350)
PMV BLD AUTO: 10 FL (ref 9.2–12.9)
POTASSIUM SERPL-SCNC: 4.1 MMOL/L (ref 3.5–5.1)
PROT SERPL-MCNC: 7.4 G/DL (ref 6–8.4)
RBC # BLD AUTO: 4.41 M/UL (ref 4.6–6.2)
SODIUM SERPL-SCNC: 140 MMOL/L (ref 136–145)
TROPONIN I SERPL DL<=0.01 NG/ML-MCNC: <0.012 NG/ML (ref 0.01–0.03)
WBC # BLD AUTO: 4.58 K/UL (ref 3.9–12.7)

## 2019-10-29 PROCEDURE — 85025 COMPLETE CBC W/AUTO DIFF WBC: CPT | Mod: ER

## 2019-10-29 PROCEDURE — 36000 PLACE NEEDLE IN VEIN: CPT | Mod: ER

## 2019-10-29 PROCEDURE — 80053 COMPREHEN METABOLIC PANEL: CPT | Mod: ER

## 2019-10-29 PROCEDURE — 99285 EMERGENCY DEPT VISIT HI MDM: CPT | Mod: 25,ER

## 2019-10-29 PROCEDURE — 25000003 PHARM REV CODE 250: Mod: ER | Performed by: EMERGENCY MEDICINE

## 2019-10-29 PROCEDURE — 93010 ELECTROCARDIOGRAM REPORT: CPT | Mod: ,,, | Performed by: INTERNAL MEDICINE

## 2019-10-29 PROCEDURE — 84484 ASSAY OF TROPONIN QUANT: CPT | Mod: ER

## 2019-10-29 PROCEDURE — 93010 EKG 12-LEAD: ICD-10-PCS | Mod: ,,, | Performed by: INTERNAL MEDICINE

## 2019-10-29 PROCEDURE — 94760 N-INVAS EAR/PLS OXIMETRY 1: CPT | Mod: ER

## 2019-10-29 PROCEDURE — 83880 ASSAY OF NATRIURETIC PEPTIDE: CPT | Mod: ER

## 2019-10-29 PROCEDURE — 93005 ELECTROCARDIOGRAM TRACING: CPT | Mod: ER

## 2019-10-29 RX ORDER — IBUPROFEN 400 MG/1
800 TABLET ORAL
Status: COMPLETED | OUTPATIENT
Start: 2019-10-29 | End: 2019-10-29

## 2019-10-29 RX ORDER — IBUPROFEN 600 MG/1
600 TABLET ORAL EVERY 6 HOURS PRN
Qty: 20 TABLET | Refills: 0 | Status: SHIPPED | OUTPATIENT
Start: 2019-10-29 | End: 2023-09-27

## 2019-10-29 RX ORDER — ASPIRIN 325 MG
325 TABLET ORAL
Status: DISCONTINUED | OUTPATIENT
Start: 2019-10-29 | End: 2019-10-29 | Stop reason: HOSPADM

## 2019-10-29 RX ADMIN — IBUPROFEN 800 MG: 400 TABLET, FILM COATED ORAL at 08:10
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
## 2022-03-17 DIAGNOSIS — R06.00 DYSPNEA: Primary | ICD-10-CM

## 2022-03-29 ENCOUNTER — HOSPITAL ENCOUNTER (OUTPATIENT)
Dept: CARDIOLOGY | Facility: HOSPITAL | Age: 87
Discharge: HOME | End: 2022-03-29
Attending: FAMILY MEDICINE
Payer: COMMERCIAL

## 2022-03-29 VITALS — HEIGHT: 67 IN | WEIGHT: 202 LBS | BODY MASS INDEX: 31.71 KG/M2

## 2022-03-29 DIAGNOSIS — R06.00 DYSPNEA: ICD-10-CM

## 2022-03-29 LAB
AORTIC ROOT ANNULUS: 3.85 CM
AV INDEX (PROSTH): 0.91
AV MEAN GRADIENT: 3 MMHG
AV PEAK GRADIENT: 5 MMHG
AV VALVE AREA: 3.31 CM2
AV VELOCITY RATIO: 0.94
BSA FOR ECHO PROCEDURE: 2.08 M2
CV ECHO LV RWT: 0.48 CM
DOP CALC AO PEAK VEL: 1.09 M/S
DOP CALC AO VTI: 23.25 CM
DOP CALC LVOT AREA: 3.6 CM2
DOP CALC LVOT DIAMETER: 2.15 CM
DOP CALC LVOT PEAK VEL: 1.03 M/S
DOP CALC LVOT STROKE VOLUME: 76.86 CM3
DOP CALC MV VTI: 30.39 CM
DOP CALCLVOT PEAK VEL VTI: 21.18 CM
E WAVE DECELERATION TIME: 259.84 MSEC
E/A RATIO: 0.68
E/E' RATIO: 11.08 M/S
ECHO LV POSTERIOR WALL: 1.09 CM (ref 0.6–1.1)
EJECTION FRACTION: 60 %
FRACTIONAL SHORTENING: 39 % (ref 28–44)
INTERVENTRICULAR SEPTUM: 1.06 CM (ref 0.6–1.1)
LA MAJOR: 4.26 CM
LA MINOR: 5.25 CM
LA WIDTH: 3.81 CM
LEFT ATRIUM SIZE: 3.76 CM
LEFT ATRIUM VOLUME INDEX MOD: 17 ML/M2
LEFT ATRIUM VOLUME INDEX: 28.2 ML/M2
LEFT ATRIUM VOLUME MOD: 34.58 CM3
LEFT ATRIUM VOLUME: 57.27 CM3
LEFT INTERNAL DIMENSION IN SYSTOLE: 2.8 CM (ref 2.1–4)
LEFT VENTRICLE DIASTOLIC VOLUME INDEX: 46.89 ML/M2
LEFT VENTRICLE DIASTOLIC VOLUME: 95.18 ML
LEFT VENTRICLE MASS INDEX: 85 G/M2
LEFT VENTRICLE SYSTOLIC VOLUME INDEX: 14.5 ML/M2
LEFT VENTRICLE SYSTOLIC VOLUME: 29.48 ML
LEFT VENTRICULAR INTERNAL DIMENSION IN DIASTOLE: 4.56 CM (ref 3.5–6)
LEFT VENTRICULAR MASS: 173.07 G
LV LATERAL E/E' RATIO: 10.29 M/S
LV SEPTAL E/E' RATIO: 12 M/S
MV PEAK A VEL: 1.06 M/S
MV PEAK E VEL: 0.72 M/S
MV PEAK GRADIENT: 6 MMHG
MV STENOSIS PRESSURE HALF TIME: 75.35 MS
MV VALVE AREA BY CONTINUITY EQUATION: 2.53 CM2
MV VALVE AREA P 1/2 METHOD: 2.92 CM2
PISA TR MAX VEL: 2.67 M/S
PULM VEIN S/D RATIO: 0.88
PV PEAK D VEL: 0.51 M/S
PV PEAK S VEL: 0.45 M/S
PV PEAK VELOCITY: 0.93 CM/S
RA MAJOR: 4.02 CM
RA PRESSURE: 3 MMHG
RA WIDTH: 3 CM
RIGHT VENTRICULAR END-DIASTOLIC DIMENSION: 2.8 CM
RV TISSUE DOPPLER FREE WALL SYSTOLIC VELOCITY 1 (APICAL 4 CHAMBER VIEW): 13.81 CM/S
SINUS: 2.94 CM
TDI LATERAL: 0.07 M/S
TDI SEPTAL: 0.06 M/S
TDI: 0.07 M/S
TR MAX PG: 29 MMHG
TV REST PULMONARY ARTERY PRESSURE: 32 MMHG

## 2022-03-29 PROCEDURE — 93306 TTE W/DOPPLER COMPLETE: CPT | Mod: PO

## 2022-03-29 PROCEDURE — 93306 TTE W/DOPPLER COMPLETE: CPT | Mod: 26,,, | Performed by: INTERNAL MEDICINE

## 2022-03-29 PROCEDURE — 93306 ECHO (CUPID ONLY): ICD-10-PCS | Mod: 26,,, | Performed by: INTERNAL MEDICINE

## 2022-06-28 ENCOUNTER — HOSPITAL ENCOUNTER (EMERGENCY)
Facility: HOSPITAL | Age: 87
Discharge: HOME | End: 2022-06-28
Attending: EMERGENCY MEDICINE
Payer: COMMERCIAL

## 2022-06-28 VITALS
SYSTOLIC BLOOD PRESSURE: 198 MMHG | WEIGHT: 218.25 LBS | OXYGEN SATURATION: 98 % | TEMPERATURE: 98 F | DIASTOLIC BLOOD PRESSURE: 86 MMHG | RESPIRATION RATE: 18 BRPM | HEIGHT: 67 IN | HEART RATE: 69 BPM | BODY MASS INDEX: 34.26 KG/M2

## 2022-06-28 DIAGNOSIS — M54.9 ACUTE RIGHT-SIDED BACK PAIN, UNSPECIFIED BACK LOCATION: Primary | ICD-10-CM

## 2022-06-28 LAB
BILIRUB UR QL STRIP: NEGATIVE
CLARITY UR REFRACT.AUTO: CLEAR
COLOR UR AUTO: NORMAL
GLUCOSE UR QL STRIP: NEGATIVE
HGB UR QL STRIP: NEGATIVE
KETONES UR QL STRIP: NEGATIVE
LEUKOCYTE ESTERASE UR QL STRIP: NEGATIVE
NITRITE UR QL STRIP: NEGATIVE
PH UR STRIP: 6 [PH] (ref 5–8)
PROT UR QL STRIP: NEGATIVE
SP GR UR STRIP: 1.01 (ref 1–1.03)
URN SPEC COLLECT METH UR: NORMAL
UROBILINOGEN UR STRIP-ACNC: NEGATIVE EU/DL

## 2022-06-28 PROCEDURE — 99284 EMERGENCY DEPT VISIT MOD MDM: CPT | Mod: 25,ER

## 2022-06-28 PROCEDURE — 25000003 PHARM REV CODE 250: Mod: ER | Performed by: EMERGENCY MEDICINE

## 2022-06-28 PROCEDURE — 96372 THER/PROPH/DIAG INJ SC/IM: CPT | Performed by: EMERGENCY MEDICINE

## 2022-06-28 PROCEDURE — 63600175 PHARM REV CODE 636 W HCPCS: Mod: ER | Performed by: EMERGENCY MEDICINE

## 2022-06-28 PROCEDURE — 81003 URINALYSIS AUTO W/O SCOPE: CPT | Mod: ER | Performed by: EMERGENCY MEDICINE

## 2022-06-28 RX ORDER — KETOROLAC TROMETHAMINE 30 MG/ML
15 INJECTION, SOLUTION INTRAMUSCULAR; INTRAVENOUS
Status: COMPLETED | OUTPATIENT
Start: 2022-06-28 | End: 2022-06-28

## 2022-06-28 RX ORDER — HYDROCODONE BITARTRATE AND ACETAMINOPHEN 5; 325 MG/1; MG/1
1 TABLET ORAL
Status: COMPLETED | OUTPATIENT
Start: 2022-06-28 | End: 2022-06-28

## 2022-06-28 RX ORDER — MORPHINE SULFATE 4 MG/ML
2 INJECTION, SOLUTION INTRAMUSCULAR; INTRAVENOUS
Status: COMPLETED | OUTPATIENT
Start: 2022-06-28 | End: 2022-06-28

## 2022-06-28 RX ORDER — DOCUSATE SODIUM 100 MG/1
100 CAPSULE, LIQUID FILLED ORAL 2 TIMES DAILY PRN
Qty: 20 CAPSULE | Refills: 0 | Status: SHIPPED | OUTPATIENT
Start: 2022-06-28

## 2022-06-28 RX ORDER — HYDROCODONE BITARTRATE AND ACETAMINOPHEN 5; 325 MG/1; MG/1
1 TABLET ORAL EVERY 6 HOURS PRN
Qty: 11 TABLET | Refills: 0 | Status: SHIPPED | OUTPATIENT
Start: 2022-06-28

## 2022-06-28 RX ADMIN — HYDROCODONE BITARTRATE AND ACETAMINOPHEN 1 TABLET: 5; 325 TABLET ORAL at 04:06

## 2022-06-28 RX ADMIN — KETOROLAC TROMETHAMINE 15 MG: 30 INJECTION, SOLUTION INTRAMUSCULAR; INTRAVENOUS at 04:06

## 2022-06-28 RX ADMIN — MORPHINE SULFATE 2 MG: 4 INJECTION INTRAVENOUS at 07:06

## 2022-06-28 NOTE — ED PROVIDER NOTES
Encounter Date: 2022       History     Chief Complaint   Patient presents with    Back Pain     Patient arrives via [facility] for back pain that began a few days ago. Patient reports doing lifting. Patient does have hx of back sx. Patient taking Tylenol with no relief. Patient reports difficulty walking starting today due to pain.      HPI   89 y.o.   BIBEMS for back pain for few days  R sided lumbar  nr  Mod  Denies injury or assoc sx  No relief with tylenol  Came in because could not get comfortable  No leg sx, bowel/bladder changes  Hard to get around (due to pain) not weakness in legs    Review of patient's allergies indicates:   Allergen Reactions    Gabapentin (bulk) Other (See Comments)     Severe constipation    Prednisone      Past Medical History:   Diagnosis Date    Arthritis     In back, neck    Bell's palsy feb or 2013    Blood clot in vein     right leg after back surgery    Cataract associated with other syndromes     GERD (gastroesophageal reflux disease)     Hypertension     Other and unspecified hyperlipidemia     Stroke     TIA (transient ischemic attack)     Feb or 2013     Past Surgical History:   Procedure Laterality Date    APPENDECTOMY      BACK SURGERY      PROSTATE SURGERY       Family History   Problem Relation Age of Onset    Stroke Father     Hypertension Son      Social History     Tobacco Use    Smoking status: Former Smoker     Quit date: 1955     Years since quittin.5   Substance Use Topics    Alcohol use: No     Review of Systems  All systems were reviewed/examined and were negative except as noted in the HPI.    Physical Exam     Initial Vitals   BP Pulse Resp Temp SpO2   22 0433 22 0433 22 0433 22 0451 22 0433   (!) 198/86 69 18 98.3 °F (36.8 °C) 98 %      MAP       --                Physical Exam    General: the patient is awake, alert, and in no apparent distress.  Head: normocephalic and atraumatic,  sclera are clear  Neck: supple without meningismus  Chest: no respiratory distress  Heart: regular rate and rhythm  ABD soft, nontender, nondistended, no peritoneal signs  Back nt in the midline  Extremities: warm and well perfused    Legs nvi, neg SLR  Skin: warm and dry  Psych conversant  Neuro: awake, alert, moving all extremities      ED Course   Procedures  Labs Reviewed   URINALYSIS, REFLEX TO URINE CULTURE    Narrative:     Preferred Collection Type->Urine, Clean Catch  Specimen Source->Urine  Collection Type->Urine, Clean Catch          Imaging Results          CT Sacrum Without Contrast (Final result)  Result time 06/28/22 07:21:04    Final result by Jeff Bhakta MD (06/28/22 07:21:04)                 Impression:      No acute abnormality.  Chronic findings as above.      Electronically signed by: Jeff Bhakta  Date:    06/28/2022  Time:    07:21             Narrative:    EXAMINATION:  CT SACRUM WITHOUT CONTRAST    TECHNIQUE:  CT of the sacrum without IV contrast.  Coronal and sagittal reconstructions provided.  All CT scans at this location are performed using dose modulation techniques as appropriate to a performed exam including the following: Automated exposure control; adjustment of the mA and/or kV  according to patient size.    COMPARISON:  None    FINDINGS:  Post-laminectomy changes seen at L4 and L5.  Extensive hypertrophic facet arthropathy L3-4, L4-5, L5-S1, with ankylosis.  Chronic degenerative change with mild ankylosis at the SI joints, and vacuum joint phenomenon.  No evidence of sacroiliitis.    No acute fracture or dislocation.  Bones appear osteopenic.                               CT Renal Stone Study ABD Pelvis WO (Final result)  Result time 06/28/22 07:45:27    Final result by MAGY Jimenez Sr., MD (06/28/22 07:45:27)                 Impression:      1. There is no nephrolithiasis.  2. There is a 7 mm exophytic mass off of the posterolateral aspect of the midpole of the left  kidney. This mass has a Hounsfield measurement of 22.  If additional imaging evaluation is clinically indicated, I recommend consideration of nonemergent MRI examination of the kidneys without and with IV contrast.  3. There is suspected focal thickening of the anterior wall of the urinary bladder. The thickening measures 14 mm.  If additional imaging evaluation is clinically indicated, recommend consideration of a nonemergent MRI examination of the urinary bladder without and with IV contrast.  4. There are small stones in the dependent portion of the gallbladder.  5. There are laminectomy changes of L4 and L5. There is grade 1 anterolisthesis of L4 on L5.  6. There is a 23 mm oval shaped area of hypodensity in the anterior aspect of the left lobe of the liver. This area has a Hounsfield measurement of 6.  This is characteristic of a cyst.  All CT scans at this facility use dose modulation, iterative reconstruction, and/or weight base dosing when appropriate to reduce radiation dose when appropriate to reduce radiation dose to as low as reasonably achievable.      Electronically signed by: Oniel Jimenez MD  Date:    06/28/2022  Time:    07:45             Narrative:    EXAMINATION:  CT RENAL STONE STUDY ABD PELVIS WO    CLINICAL HISTORY:  Flank pain, kidney stone suspected;    TECHNIQUE:  Standard abdomen and pelvis CT protocol without oral or IV contrast was performed.    COMPARISON:  None    FINDINGS:  Finding: The size of the heart is within normal limits.  There are minimal dependent atelectatic changes in both lungs.  There is no pneumothorax or pleural effusion.    There is a 23 mm oval shaped area of hypodensity in the anterior aspect of the left lobe of the liver.  This area has a Hounsfield measurement of 6.  There are small stones in the dependent portion of the gallbladder.  The pancreas, spleen, adrenals, and right kidney are normal in appearance.  There is a 7 mm exophytic mass off of the  posterolateral aspect of the midpole of the left kidney.  This mass has a Hounsfield measurement of 22.  The ureters are normal in appearance.  There is suspected focal thickening of the anterior wall of the urinary bladder.  The thickening measures 14 mm.  There is a mild amount of calcification within the prostate.  The appendix is absent.  There is a mild amount of diverticulosis in the sigmoid portion of the colon.  There is no free fluid within the abdomen or pelvis. There is no pneumoperitoneum.  There is a moderate amount of atherosclerosis.  There are laminectomy changes of L4 and L5.  There is grade 1 anterolisthesis of L4 on L5.                                 Medications   ketorolac injection 15 mg (15 mg Intramuscular Given 6/28/22 0443)   HYDROcodone-acetaminophen 5-325 mg per tablet 1 tablet (1 tablet Oral Given 6/28/22 0443)   morphine injection 2 mg (2 mg Intramuscular Given 6/28/22 0714)       Medical Decision Making:    This is an emergent evaluation of a patient presenting to the ED.  Nursing notes were reviewed.  I personally reviewed, read, and interpreted the ECG and any monitoring strips.  I reviewed radiology images personally along with interpretations.    Communicated with another physician regarding patient's care: day MD  I decided to obtain and review old medical records, which showed: well care      Mathew Fleming MD, AGNIESZKA      Evaluation for Emergency Medical Condition  The patient received a medical screening exam and within a reasonable degree of clinical confidence an emergency medical condition has not been identified.  The patient is instructed on proper follow up and return precautions to the ED.                   Clinical Impression:   Final diagnoses:  [M54.9] Acute right-sided back pain, unspecified back location (Primary)          ED Disposition Condition    Discharge Stable        ED Prescriptions     Medication Sig Dispense Start Date End Date Auth. Provider     HYDROcodone-acetaminophen (NORCO) 5-325 mg per tablet Take 1 tablet by mouth every 6 (six) hours as needed for Pain. 11 tablet 6/28/2022  Valeriy Edmonds MD    docusate sodium (COLACE) 100 MG capsule Take 1 capsule (100 mg total) by mouth 2 (two) times daily as needed (while taking pain medication). 20 capsule 6/28/2022  Valeriy Edmonds MD        Follow-up Information     Follow up With Specialties Details Why Contact Info    Norman Rose MD Family Medicine In 2 days  [address] [facility] [place] [id number] [phone]          Discharged to home in stable condition, return to ED warnings given, follow up and patient care instructions given.      Mathew Fleming MD, AGNIESZKA, FACEP  Department of Emergency Medicine       Lui Fleming MD  07/05/22 4892

## 2022-06-28 NOTE — ED PROVIDER NOTES
6:43 a.m.  I assumed care of this patient from my colleague, Dr. Fleming, at shift change.  At the time, the CT scan and urinalysis were pending.  The urinalysis is completely normal without signs of infection or microscopic hematuria.  CT scan shows no acute abnormalities.  There is diffuse bladder wall thickening.  However, with the a normal urinalysis, I do not believe that cystitis is likely.  The patient's low back pain is likely musculoskeletal.  He was treated with Toradol and Norco prior to my arrival here in the emergency department.  A currently, the patient is complaining of significant pain.  On my assessment, his pain is located to the right SI joint.  There is point tenderness in that area.  There is no hip tenderness.  There is no lumbar tenderness.  A CT scan of the sacrum has been ordered.  I will provide further pain medication.  I will reassess.    7:49 a.m.  The patient's sacral CT shows no acute abnormalities.  On over-read of the renal stone study, there are incidental findings.  These have been relayed to the patient.  He understands that he does need close follow-up.  On reassessment, the patient continues to have tenderness to palpation and pain on the SI joint.  I will discharge with a short course of Norco and stool softeners.  He has been instructed to follow up closely with his primary care physician to discuss his incidental findings on CT scan and for reassessment.  At this time, I feel the patient is clinically stable for discharge.     Valeriy Edmonds MD  06/28/22 0757

## 2023-09-27 ENCOUNTER — HOSPITAL ENCOUNTER (INPATIENT)
Facility: HOSPITAL | Age: 88
LOS: 2 days | Discharge: HOME | DRG: 378 | End: 2023-09-30
Attending: EMERGENCY MEDICINE | Admitting: INTERNAL MEDICINE
Payer: COMMERCIAL

## 2023-09-27 DIAGNOSIS — D50.9 IRON DEFICIENCY ANEMIA, UNSPECIFIED IRON DEFICIENCY ANEMIA TYPE: ICD-10-CM

## 2023-09-27 DIAGNOSIS — K92.2 GI BLEED: ICD-10-CM

## 2023-09-27 DIAGNOSIS — D64.9 SYMPTOMATIC ANEMIA: Primary | ICD-10-CM

## 2023-09-27 DIAGNOSIS — R06.02 SHORTNESS OF BREATH: ICD-10-CM

## 2023-09-27 DIAGNOSIS — D64.9 ANEMIA, UNSPECIFIED TYPE: ICD-10-CM

## 2023-09-27 LAB
ABO + RH BLD: NORMAL
ALBUMIN SERPL BCP-MCNC: 3.4 G/DL (ref 3.5–5.2)
ALP SERPL-CCNC: 67 U/L (ref 55–135)
ALT SERPL W/O P-5'-P-CCNC: 30 U/L (ref 10–44)
ANION GAP SERPL CALC-SCNC: 14 MMOL/L (ref 8–16)
ANISOCYTOSIS BLD QL SMEAR: ABNORMAL
AST SERPL-CCNC: 54 U/L (ref 10–40)
BASOPHILS # BLD AUTO: 0.02 K/UL (ref 0–0.2)
BASOPHILS NFR BLD: 0.2 % (ref 0–1.9)
BILIRUB SERPL-MCNC: 0.3 MG/DL (ref 0.1–1)
BILIRUB UR QL STRIP: NEGATIVE
BLD GP AB SCN CELLS X3 SERPL QL: NORMAL
BLD PROD TYP BPU: NORMAL
BLD PROD TYP BPU: NORMAL
BLOOD UNIT EXPIRATION DATE: NORMAL
BLOOD UNIT EXPIRATION DATE: NORMAL
BLOOD UNIT TYPE CODE: 6200
BLOOD UNIT TYPE CODE: 6200
BLOOD UNIT TYPE: NORMAL
BLOOD UNIT TYPE: NORMAL
BUN SERPL-MCNC: 42 MG/DL (ref 8–23)
CALCIUM SERPL-MCNC: 8.6 MG/DL (ref 8.7–10.5)
CHLORIDE SERPL-SCNC: 102 MMOL/L (ref 95–110)
CLARITY UR: CLEAR
CO2 SERPL-SCNC: 23 MMOL/L (ref 23–29)
CODING SYSTEM: NORMAL
CODING SYSTEM: NORMAL
COLOR UR: YELLOW
CREAT SERPL-MCNC: 1.6 MG/DL (ref 0.5–1.4)
CREAT UR-MCNC: 79.2 MG/DL (ref 23–375)
CROSSMATCH INTERPRETATION: NORMAL
CROSSMATCH INTERPRETATION: NORMAL
DACRYOCYTES BLD QL SMEAR: ABNORMAL
DIFFERENTIAL METHOD: ABNORMAL
DISPENSE STATUS: NORMAL
DISPENSE STATUS: NORMAL
EOSINOPHIL # BLD AUTO: 0.1 K/UL (ref 0–0.5)
EOSINOPHIL NFR BLD: 1.1 % (ref 0–8)
ERYTHROCYTE [DISTWIDTH] IN BLOOD BY AUTOMATED COUNT: 21.9 % (ref 11.5–14.5)
EST. GFR  (NO RACE VARIABLE): 41 ML/MIN/1.73 M^2
FERRITIN SERPL-MCNC: 5 NG/ML (ref 20–300)
GLUCOSE SERPL-MCNC: 106 MG/DL (ref 70–110)
GLUCOSE UR QL STRIP: NEGATIVE
HCT VFR BLD AUTO: 17.7 % (ref 40–54)
HGB BLD-MCNC: 4.6 G/DL (ref 14–18)
HGB UR QL STRIP: NEGATIVE
HYPOCHROMIA BLD QL SMEAR: ABNORMAL
IMM GRANULOCYTES # BLD AUTO: 0.03 K/UL (ref 0–0.04)
IMM GRANULOCYTES NFR BLD AUTO: 0.4 % (ref 0–0.5)
IRON SERPL-MCNC: <10 UG/DL (ref 45–160)
KETONES UR QL STRIP: NEGATIVE
LDH SERPL L TO P-CCNC: 259 U/L (ref 110–260)
LEUKOCYTE ESTERASE UR QL STRIP: NEGATIVE
LYMPHOCYTES # BLD AUTO: 1.5 K/UL (ref 1–4.8)
LYMPHOCYTES NFR BLD: 17.5 % (ref 18–48)
MCH RBC QN AUTO: 16 PG (ref 27–31)
MCHC RBC AUTO-ENTMCNC: 26 G/DL (ref 32–36)
MCV RBC AUTO: 62 FL (ref 82–98)
MONOCYTES # BLD AUTO: 1.4 K/UL (ref 0.3–1)
MONOCYTES NFR BLD: 16 % (ref 4–15)
NEUTROPHILS # BLD AUTO: 5.5 K/UL (ref 1.8–7.7)
NEUTROPHILS NFR BLD: 64.8 % (ref 38–73)
NITRITE UR QL STRIP: NEGATIVE
NRBC BLD-RTO: 1 /100 WBC
NUM UNITS TRANS PACKED RBC: NORMAL
OB PNL STL: NEGATIVE
PH UR STRIP: 7 [PH] (ref 5–8)
PLATELET # BLD AUTO: 450 K/UL (ref 150–450)
PMV BLD AUTO: 9.8 FL (ref 9.2–12.9)
POTASSIUM SERPL-SCNC: 4.4 MMOL/L (ref 3.5–5.1)
PROT SERPL-MCNC: 7.5 G/DL (ref 6–8.4)
PROT UR QL STRIP: NEGATIVE
RBC # BLD AUTO: 2.88 M/UL (ref 4.6–6.2)
SATURATED IRON: ABNORMAL % (ref 20–50)
SCHISTOCYTES BLD QL SMEAR: PRESENT
SODIUM SERPL-SCNC: 139 MMOL/L (ref 136–145)
SODIUM UR-SCNC: 57 MMOL/L (ref 20–250)
SP GR UR STRIP: 1.01 (ref 1–1.03)
SPECIMEN OUTDATE: NORMAL
TARGETS BLD QL SMEAR: ABNORMAL
TOTAL IRON BINDING CAPACITY: 462 UG/DL (ref 250–450)
TRANS ERYTHROCYTES VOL PATIENT: NORMAL ML
TRANSFERRIN SERPL-MCNC: 312 MG/DL (ref 200–375)
URN SPEC COLLECT METH UR: NORMAL
UROBILINOGEN UR STRIP-ACNC: NEGATIVE EU/DL
WBC # BLD AUTO: 8.45 K/UL (ref 3.9–12.7)

## 2023-09-27 PROCEDURE — 85025 COMPLETE CBC W/AUTO DIFF WBC: CPT

## 2023-09-27 PROCEDURE — P9016 RBC LEUKOCYTES REDUCED: HCPCS | Performed by: EMERGENCY MEDICINE

## 2023-09-27 PROCEDURE — 82570 ASSAY OF URINE CREATININE: CPT | Performed by: STUDENT IN AN ORGANIZED HEALTH CARE EDUCATION/TRAINING PROGRAM

## 2023-09-27 PROCEDURE — 93010 EKG 12-LEAD: ICD-10-PCS | Mod: ,,, | Performed by: INTERNAL MEDICINE

## 2023-09-27 PROCEDURE — P9021 RED BLOOD CELLS UNIT: HCPCS | Performed by: EMERGENCY MEDICINE

## 2023-09-27 PROCEDURE — 93010 ELECTROCARDIOGRAM REPORT: CPT | Mod: ,,, | Performed by: INTERNAL MEDICINE

## 2023-09-27 PROCEDURE — 93005 ELECTROCARDIOGRAM TRACING: CPT

## 2023-09-27 PROCEDURE — 84466 ASSAY OF TRANSFERRIN: CPT | Performed by: STUDENT IN AN ORGANIZED HEALTH CARE EDUCATION/TRAINING PROGRAM

## 2023-09-27 PROCEDURE — 63600175 PHARM REV CODE 636 W HCPCS: Performed by: STUDENT IN AN ORGANIZED HEALTH CARE EDUCATION/TRAINING PROGRAM

## 2023-09-27 PROCEDURE — 86900 BLOOD TYPING SEROLOGIC ABO: CPT

## 2023-09-27 PROCEDURE — 84300 ASSAY OF URINE SODIUM: CPT | Performed by: STUDENT IN AN ORGANIZED HEALTH CARE EDUCATION/TRAINING PROGRAM

## 2023-09-27 PROCEDURE — 86920 COMPATIBILITY TEST SPIN: CPT | Performed by: STUDENT IN AN ORGANIZED HEALTH CARE EDUCATION/TRAINING PROGRAM

## 2023-09-27 PROCEDURE — 83540 ASSAY OF IRON: CPT | Performed by: STUDENT IN AN ORGANIZED HEALTH CARE EDUCATION/TRAINING PROGRAM

## 2023-09-27 PROCEDURE — 83010 ASSAY OF HAPTOGLOBIN QUANT: CPT

## 2023-09-27 PROCEDURE — 99291 CRITICAL CARE FIRST HOUR: CPT

## 2023-09-27 PROCEDURE — 80053 COMPREHEN METABOLIC PANEL: CPT

## 2023-09-27 PROCEDURE — 82272 OCCULT BLD FECES 1-3 TESTS: CPT | Performed by: EMERGENCY MEDICINE

## 2023-09-27 PROCEDURE — 36430 TRANSFUSION BLD/BLD COMPNT: CPT

## 2023-09-27 PROCEDURE — G0378 HOSPITAL OBSERVATION PER HR: HCPCS

## 2023-09-27 PROCEDURE — 86920 COMPATIBILITY TEST SPIN: CPT | Performed by: EMERGENCY MEDICINE

## 2023-09-27 PROCEDURE — 82728 ASSAY OF FERRITIN: CPT | Performed by: STUDENT IN AN ORGANIZED HEALTH CARE EDUCATION/TRAINING PROGRAM

## 2023-09-27 PROCEDURE — 83615 LACTATE (LD) (LDH) ENZYME: CPT

## 2023-09-27 PROCEDURE — 96374 THER/PROPH/DIAG INJ IV PUSH: CPT

## 2023-09-27 PROCEDURE — C9113 INJ PANTOPRAZOLE SODIUM, VIA: HCPCS | Performed by: STUDENT IN AN ORGANIZED HEALTH CARE EDUCATION/TRAINING PROGRAM

## 2023-09-27 PROCEDURE — 81003 URINALYSIS AUTO W/O SCOPE: CPT | Performed by: STUDENT IN AN ORGANIZED HEALTH CARE EDUCATION/TRAINING PROGRAM

## 2023-09-27 RX ORDER — HYDROCODONE BITARTRATE AND ACETAMINOPHEN 500; 5 MG/1; MG/1
TABLET ORAL
Status: DISCONTINUED | OUTPATIENT
Start: 2023-09-27 | End: 2023-09-30 | Stop reason: HOSPADM

## 2023-09-27 RX ORDER — PANTOPRAZOLE SODIUM 40 MG/10ML
40 INJECTION, POWDER, LYOPHILIZED, FOR SOLUTION INTRAVENOUS 2 TIMES DAILY
Status: DISCONTINUED | OUTPATIENT
Start: 2023-09-28 | End: 2023-09-30 | Stop reason: HOSPADM

## 2023-09-27 RX ORDER — BUMETANIDE 1 MG/1
1 TABLET ORAL DAILY
COMMUNITY
Start: 2023-09-01

## 2023-09-27 RX ORDER — HYDROCHLOROTHIAZIDE 25 MG/1
25 TABLET ORAL DAILY
COMMUNITY
Start: 2023-09-19

## 2023-09-27 RX ORDER — SODIUM CHLORIDE 0.9 % (FLUSH) 0.9 %
10 SYRINGE (ML) INJECTION
Status: DISCONTINUED | OUTPATIENT
Start: 2023-09-27 | End: 2023-09-30 | Stop reason: HOSPADM

## 2023-09-27 RX ORDER — ATORVASTATIN CALCIUM 20 MG/1
20 TABLET, FILM COATED ORAL DAILY
Status: DISCONTINUED | OUTPATIENT
Start: 2023-09-28 | End: 2023-09-30 | Stop reason: HOSPADM

## 2023-09-27 RX ORDER — OXYBUTYNIN CHLORIDE 5 MG/1
10 TABLET, EXTENDED RELEASE ORAL DAILY
Status: DISCONTINUED | OUTPATIENT
Start: 2023-09-28 | End: 2023-09-30 | Stop reason: HOSPADM

## 2023-09-27 RX ORDER — PANTOPRAZOLE SODIUM 40 MG/10ML
40 INJECTION, POWDER, LYOPHILIZED, FOR SOLUTION INTRAVENOUS
Status: COMPLETED | OUTPATIENT
Start: 2023-09-27 | End: 2023-09-27

## 2023-09-27 RX ORDER — OXYBUTYNIN CHLORIDE 10 MG/1
10 TABLET, EXTENDED RELEASE ORAL DAILY
COMMUNITY
Start: 2023-09-08

## 2023-09-27 RX ORDER — IRBESARTAN 300 MG/1
300 TABLET ORAL DAILY
COMMUNITY
Start: 2023-09-19

## 2023-09-27 RX ORDER — BISACODYL 5 MG/1
1 TABLET, COATED ORAL DAILY
COMMUNITY

## 2023-09-27 RX ORDER — OMEPRAZOLE 20 MG/1
20 TABLET, DELAYED RELEASE ORAL DAILY
Status: ON HOLD | COMMUNITY
End: 2023-09-30 | Stop reason: SDUPTHER

## 2023-09-27 RX ADMIN — PANTOPRAZOLE SODIUM 40 MG: 40 INJECTION, POWDER, FOR SOLUTION INTRAVENOUS at 06:09

## 2023-09-27 NOTE — ED NOTES
Pt arrived from home with his wife after having lab work done and being told to report to the ER for low H&H. Pt has been weak and appears pale. Plan of care reviewed, room assignment pending.

## 2023-09-27 NOTE — H&P
[facility] Medicine H&P Note     Admitting Team: [facility] Hospitalist Team B  Attending Physician: Ronny Tai MD  Resident: Antony  Intern: Arturo    Date of Admit: 9/27/2023    Chief Complaint     Weakness x 3 days     Subjective:      History of Present Illness:  Lon Chin Jr. is a 89 y.o. male who  has a past medical history of Arthritis, Bell's palsy (feb or march 2013), Blood clot in vein, Cataract associated with other syndromes, GERD (gastroesophageal reflux disease), Hypertension, Other and unspecified hyperlipidemia, Stroke, and TIA (transient ischemic attack). The patient presented to [facility] on 9/27/2023 with a primary complaint of Weakness (H&H 4.2 and  18.2. C/O weakness for several days. Some back pain note that is chronic.)      The patient was in their usual state of health until a few weeks ago when he started feeling fatigued and dyspneic which worsened in the past 3 days. He also endorses abdominal discomfort, distension and dark black stools; he believes it could be from the regular use of Pepto Bismo. He experiences periods of constipation mixed with episodes of diarrhea. Denies blood in urine and NSAID use.     Past Medical History:  Past Medical History:   Diagnosis Date    Arthritis     In back, neck    Bell's palsy feb or march 2013    Blood clot in vein     right leg after back surgery    Cataract associated with other syndromes     GERD (gastroesophageal reflux disease)     Hypertension     Other and unspecified hyperlipidemia     Stroke     TIA (transient ischemic attack)     Feb or march 2013       Past Surgical History:  Past Surgical History:   Procedure Laterality Date    APPENDECTOMY      BACK SURGERY      PROSTATE SURGERY         Allergies:  Review of patient's allergies indicates:   Allergen Reactions    Gabapentin (bulk) Other (See Comments)     Severe constipation    Prednisone        Home Medications:  Prior to Admission medications    Medication  Sig Start Date End Date Taking? Authorizing Provider   atorvastatin (LIPITOR) 20 MG tablet Take 1 tablet (20 mg total) by mouth once daily. 14 Yes Lui Correa MD   bumetanide (BUMEX) 1 MG tablet Take 1 mg by mouth once daily. 23  Yes Provider, Historical   clopidogrel (PLAVIX) 75 mg tablet Take 75 mg by mouth once daily.   Yes Provider, Historical   hydroCHLOROthiazide (HYDRODIURIL) 25 MG tablet Take 25 mg by mouth once daily. 23  Yes Provider, Historical   HYDROcodone-acetaminophen (NORCO) 5-325 mg per tablet Take 1 tablet by mouth every 6 (six) hours as needed for Pain. 22  Yes Valeriy Edmonds MD   irbesartan (AVAPRO) 300 MG tablet Take 300 mg by mouth once daily. 23  Yes Provider, Historical   nebivolol (BYSTOLIC) 10 MG Tab Take 1 tablet (10 mg total) by mouth once daily.  Patient taking differently: Take 10 mg by mouth every Mon, Wed, Fri. 13  Yes Tito Reyes MD   oxybutynin (DITROPAN-XL) 10 MG 24 hr tablet Take 10 mg by mouth once daily. 23  Yes Provider, Historical   potassium chloride SA (KLOR-CON M20) 20 MEQ tablet Take 1 tablet (20 mEq total) by mouth once daily. 13  Yes Tito Reyes MD       Family History:  Family History   Problem Relation Age of Onset    Stroke Father     Hypertension Son        Social History:  Social History     Tobacco Use    Smoking status: Former     Current packs/day: 0.00     Types: Cigarettes     Quit date: 1955     Years since quittin.7   Substance Use Topics    Alcohol use: No       Review of Systems:  Review of Systems   Constitutional:  Positive for malaise/fatigue.   HENT: Negative.     Eyes: Negative.    Respiratory: Negative.     Cardiovascular: Negative.    Gastrointestinal:  Positive for abdominal pain, blood in stool, constipation, diarrhea and melena.   Genitourinary: Negative.  Negative for hematuria.   Musculoskeletal:  Positive for back pain.   Skin: Negative.    Neurological:  Positive for  "weakness. Negative for tingling.   Endo/Heme/Allergies: Negative.    Psychiatric/Behavioral: Negative.          Health Maintaince :   Primary Care Physician: Rose      Immunizations:   TDap Unknown    Flu UTD  Pna Unknown    Cancer Screening:  Colonoscopy: never     Objective:   Last 24 Hour Vital Signs:  BP  Min: 115/57  Max: 143/63  Temp  Av.6 °F (37 °C)  Min: 97.7 °F (36.5 °C)  Max: 99.3 °F (37.4 °C)  Pulse  Av.4  Min: 75  Max: 78  Resp  Av  Min: 18  Max: 18  SpO2  Av %  Min: 94 %  Max: 100 %  Height  Av' 8" (172.7 cm)  Min: 5' 8" (172.7 cm)  Max: 5' 8" (172.7 cm)  Weight  Av.6 kg (213 lb)  Min: 96.6 kg (213 lb)  Max: 96.6 kg (213 lb)  Body mass index is 32.39 kg/m².  No intake/output data recorded.    Physical Examination:  Physical Exam  Constitutional:       Appearance: Normal appearance.   Eyes:      Extraocular Movements: Extraocular movements intact.      Pupils: Pupils are equal, round, and reactive to light.   Cardiovascular:      Rate and Rhythm: Normal rate and regular rhythm.   Pulmonary:      Effort: Pulmonary effort is normal.   Abdominal:      General: There is distension.   Musculoskeletal:         General: Tenderness present.      Right lower leg: Edema present.      Left lower leg: Edema present.      Comments: Tenderness in B/L LE (L>R)   Skin:     General: Skin is warm.      Capillary Refill: Capillary refill takes less than 2 seconds.   Neurological:      General: No focal deficit present.      Mental Status: He is alert and oriented to person, place, and time.      Cranial Nerves: Cranial nerve deficit present.      Sensory: Sensory deficit present.      Motor: Weakness present.      Comments: Hx of Stroke - left sided deficits  Face : slight left hemiplegia, left CN VII paresis  Motor : good tone, 5/5 B/L UE & LE   Sensory : 2/2 B/L UE, 1/2 LLE    Psychiatric:         Mood and Affect: Mood normal.         Behavior: Behavior normal.          Laboratory:  Most " "Recent Data:  CBC:   Lab Results   Component Value Date    WBC 8.45 09/27/2023    HGB 4.6 (LL) 09/27/2023    HCT 17.7 (LL) 09/27/2023     09/27/2023    MCV 62 (L) 09/27/2023    RDW 21.9 (H) 09/27/2023       BMP:   Lab Results   Component Value Date     09/27/2023    K 4.4 09/27/2023     09/27/2023    CO2 23 09/27/2023    BUN 42 (H) 09/27/2023    CREATININE 1.6 (H) 09/27/2023     09/27/2023    CALCIUM 8.6 (L) 09/27/2023    MG 2.0 04/02/2013    PHOS 2.9 04/02/2013     LFTs:   Lab Results   Component Value Date    PROT 7.5 09/27/2023    ALBUMIN 3.4 (L) 09/27/2023    BILITOT 0.3 09/27/2023    AST 54 (H) 09/27/2023    ALKPHOS 67 09/27/2023    ALT 30 09/27/2023     Coags:   Lab Results   Component Value Date    INR 1.2 04/01/2013     FLP:   Lab Results   Component Value Date    CHOL 156 07/08/2013    HDL 72 07/08/2013    LDLCALC 71.0 07/08/2013    TRIG 64 07/08/2013    CHOLHDL 46.2 07/08/2013     DM:   Lab Results   Component Value Date    HGBA1C 5.9 04/01/2013    LDLCALC 71.0 07/08/2013    CREATININE 1.6 (H) 09/27/2023     Thyroid:   Lab Results   Component Value Date    TSH 1.803 04/01/2013     Anemia:   Lab Results   Component Value Date    FERRITIN 5 (L) 09/27/2023     Cardiac:   Lab Results   Component Value Date    TROPONINI <0.012 10/29/2019    BNP 13 04/01/2013     Urinalysis:   Lab Results   Component Value Date    COLORU Straw 06/28/2022    SPECGRAV 1.015 06/28/2022    NITRITE Negative 06/28/2022    KETONESU Negative 06/28/2022    UROBILINOGEN Negative 06/28/2022       Trended Lab Data:  Recent Labs   Lab 09/27/23  1357   WBC 8.45   HGB 4.6*   HCT 17.7*      MCV 62*   RDW 21.9*      K 4.4      CO2 23   BUN 42*   CREATININE 1.6*      PROT 7.5   ALBUMIN 3.4*   BILITOT 0.3   AST 54*   ALKPHOS 67   ALT 30       Trended Cardiac Data:  No results for input(s): "TROPONINI", "CKTOTAL", "CKMB", "BNP" in the last 168 hours.    Microbiology Data: none    Other " Results:  EKG (my interpretation): none    Radiology:  Imaging Results    None          Assessment:     Lon Chin Jr. is a 89 y.o. male with a PMH of Bell's palsy, TIA, stroke, HTN, GERD, HDL presenting with increasing dyspnea and fatigue for the past 3 days. He endorses dark stool and frequent use of pepto bismol. In the ED he was found to have a H/H of 4.2/18.2 prompting repletion of blood and starting treatment for symptomatic anemia.     Plan:     Symptomatic anemia  - H/H = 4.6/17.7 on admission  - FOBT - negative  - History of Bismuth subsalicylate use with positive symptoms (dark stool, fatigue, abdominal tenderness)  PLAN :   - Follow up on iron studies  - Type & Screen  - Transfuse 2 units RBC  - PPI   - Trend H/H  - Consult GI for c/scope    Cerebral Vascular Accident  - Past history of Stroke and TIA with left sided deficits (see physical exam)  PLAN :  - Observe for changes in mentation or new neurological deficits    Hyperlipidemia  - Lipid panel  - Restart home Atorvastatin 20 mg    Hypertension  - Hypertensive while in the ED  - Holding home meds      Code Status:     Full      Diet : Regular  Ppx : Holding  Dispo : improvement in H/H    Kaylen Morris DO  [facility]    [facility] Hospitalist Pager numbers:   [facility] Hospitalist Medicine Team A (Naheed/[place]): [phone]  [facility] Hospitalist Medicine Team B (Gillian/[place]):  [phone]

## 2023-09-27 NOTE — PHARMACY MED REC
"  Admission Medication History     The home medication history was taken by Netta Donnelly CPhT.    Medication history obtained from, Patient Verified    You may go to "Admission" then "Reconcile Home Medications" tabs to review and/or act upon these items.     The home medication list has been updated by the Pharmacy department.   Please read ALL comments highlighted in yellow.   Please address this information as you see fit.    Feel free to contact us if you have any questions or require assistance.      The medications listed below were removed from the home medication list.  Please reorder if appropriate:  Patient reports no longer taking the following medication(s):  Acetaminophen-Codeine 300-30 mg  Gabapentin 300 mg  Ibuprofen 600 mg  Methocarbamol 500 mg  Nifedipine XL 60 mg  Benicar HCT 40-12.5 mg  Prednisone 10 mg      Netta Donnelly CPhT.  Ext [phone]             .          "

## 2023-09-27 NOTE — ED PROVIDER NOTES
Encounter Date: 2023       History     Chief Complaint   Patient presents with    Weakness     H&H 4.2 and  18.2. C/O weakness for several days. Some back pain note that is chronic.     Patient is a 89-year-old male who complains of generalized weakness and fatigue over the past few days.  He is also had dyspnea on exertion.  Patient had blood work done 2 days ago and was noted to be severely anemic.  He has had dark stools, but believes this may have been to taking Pepto-Bismol.  No history of anemia.      Review of patient's allergies indicates:   Allergen Reactions    Gabapentin (bulk) Other (See Comments)     Severe constipation    Prednisone      Past Medical History:   Diagnosis Date    Arthritis     In back, neck    Bell's palsy feb or 2013    Blood clot in vein     right leg after back surgery    Cataract associated with other syndromes     GERD (gastroesophageal reflux disease)     Hypertension     Other and unspecified hyperlipidemia     Stroke     TIA (transient ischemic attack)     Feb or 2013     Past Surgical History:   Procedure Laterality Date    APPENDECTOMY      BACK SURGERY      PROSTATE SURGERY       Family History   Problem Relation Age of Onset    Stroke Father     Hypertension Son      Social History     Tobacco Use    Smoking status: Former     Current packs/day: 0.00     Types: Cigarettes     Quit date: 1955     Years since quittin.7   Substance Use Topics    Alcohol use: No     Review of Systems   Constitutional:  Negative for fever.   Respiratory:  Positive for shortness of breath.    Gastrointestinal:  Negative for abdominal pain, nausea and vomiting.   Neurological:  Negative for headaches.       Physical Exam     Initial Vitals [23 1306]   BP Pulse Resp Temp SpO2   (!) 126/58 78 18 97.7 °F (36.5 °C) 96 %      MAP       --         Physical Exam    Nursing note and vitals reviewed.  HENT:   Head: Atraumatic.   Eyes:   Pale conjunctiva.   Cardiovascular:   Normal rate, regular rhythm and normal heart sounds.           Pulmonary/Chest: Breath sounds normal.   Abdominal: Abdomen is soft. There is no abdominal tenderness.   Musculoskeletal:         General: Normal range of motion.      Comments: Edema of the lower extremities bilaterally.     Neurological: He is alert and oriented to person, place, and time.   Skin: Skin is warm and dry.         ED Course   Critical Care    Date/Time: 9/27/2023 3:32 PM    Performed by: Saad Hyatt MD  Authorized by: Saad Hyatt MD  Direct patient critical care time: 60 minutes  Additional history critical care time: 5 minutes  Ordering / reviewing critical care time: 15 minutes  Documentation critical care time: 15 minutes  Consulting other physicians critical care time: 5 minutes  Total critical care time (exclusive of procedural time) : 100 minutes  Critical care was time spent personally by me on the following activities: examination of patient, obtaining history from patient or surrogate, ordering and performing treatments and interventions, ordering and review of laboratory studies, pulse oximetry, re-evaluation of patient's condition, review of old charts and discussions with primary provider.        Labs Reviewed   CBC W/ AUTO DIFFERENTIAL - Abnormal; Notable for the following components:       Result Value    RBC 2.88 (*)     Hemoglobin 4.6 (*)     Hematocrit 17.7 (*)     MCV 62 (*)     MCH 16.0 (*)     MCHC 26.0 (*)     RDW 21.9 (*)     Mono # 1.4 (*)     nRBC 1 (*)     Lymph % 17.5 (*)     Mono % 16.0 (*)     All other components within normal limits    Narrative:     HGB, HCT critical result(s) called and verbal readback obtained from   Tiffanie Woo RN. by HVB1 09/27/2023 14:27   COMPREHENSIVE METABOLIC PANEL - Abnormal; Notable for the following components:    BUN 42 (*)     Creatinine 1.6 (*)     Calcium 8.6 (*)     Albumin 3.4 (*)     AST 54 (*)     eGFR 41 (*)     All other components within  normal limits   OCCULT BLOOD X 1, STOOL   FERRITIN   IRON AND TIBC   PROTIME-INR   URINALYSIS, REFLEX TO URINE CULTURE   SODIUM, URINE, RANDOM   CREATININE, URINE, RANDOM   VITAMIN B12   FOLATE   TYPE & SCREEN   PREPARE RBC SOFT          Imaging Results    None          Medications   0.9%  NaCl infusion (for blood administration) (has no administration in time range)   atorvastatin tablet 20 mg (has no administration in time range)   oxybutynin 24 hr tablet 10 mg (has no administration in time range)   sodium chloride 0.9% flush 10 mL (has no administration in time range)   pantoprazole injection 40 mg (has no administration in time range)     Followed by   pantoprazole injection 40 mg (has no administration in time range)     Medical Decision Making  Lab work shows severe anemia with a hemoglobin of 4.6 and hematocrit of 17.7.  Type and screen has been ordered as well as 2 units of packed red blood cells.  I have discussed these findings with the [facility] internal medicine resident, who will admit.    Amount and/or Complexity of Data Reviewed  Labs: ordered.     Details: CBC shows a hemoglobin of 4.6 and hematocrit of 17.7.  CMP with a BUN of 42 and creatinine of 1.6.  Stool for occult blood is negative.  Discussion of management or test interpretation with external provider(s): Lab results discussed with [facility] internal medicine resident for admission.    Risk  Prescription drug management.                               Clinical Impression:   Final diagnoses:  [D64.9] Anemia, unspecified type (Primary)        ED Disposition Condition    Observation Stable                Saad Hyatt MD  09/27/23 2892

## 2023-09-28 LAB
ALBUMIN SERPL BCP-MCNC: 3.1 G/DL (ref 3.5–5.2)
ALP SERPL-CCNC: 59 U/L (ref 55–135)
ALT SERPL W/O P-5'-P-CCNC: 26 U/L (ref 10–44)
ANION GAP SERPL CALC-SCNC: 10 MMOL/L (ref 8–16)
AST SERPL-CCNC: 47 U/L (ref 10–40)
BASOPHILS # BLD AUTO: 0.04 K/UL (ref 0–0.2)
BASOPHILS # BLD AUTO: 0.05 K/UL (ref 0–0.2)
BASOPHILS NFR BLD: 0.4 % (ref 0–1.9)
BASOPHILS NFR BLD: 0.4 % (ref 0–1.9)
BILIRUB SERPL-MCNC: 1.1 MG/DL (ref 0.1–1)
BLD PROD TYP BPU: NORMAL
BLOOD UNIT EXPIRATION DATE: NORMAL
BLOOD UNIT TYPE CODE: 6200
BLOOD UNIT TYPE: NORMAL
BNP SERPL-MCNC: 65 PG/ML (ref 0–99)
BUN SERPL-MCNC: 34 MG/DL (ref 8–23)
CALCIUM SERPL-MCNC: 8.3 MG/DL (ref 8.7–10.5)
CHLORIDE SERPL-SCNC: 105 MMOL/L (ref 95–110)
CO2 SERPL-SCNC: 24 MMOL/L (ref 23–29)
CODING SYSTEM: NORMAL
CREAT SERPL-MCNC: 1.3 MG/DL (ref 0.5–1.4)
CROSSMATCH INTERPRETATION: NORMAL
DIFFERENTIAL METHOD: ABNORMAL
DIFFERENTIAL METHOD: ABNORMAL
DISPENSE STATUS: NORMAL
EOSINOPHIL # BLD AUTO: 0.1 K/UL (ref 0–0.5)
EOSINOPHIL # BLD AUTO: 0.1 K/UL (ref 0–0.5)
EOSINOPHIL NFR BLD: 1 % (ref 0–8)
EOSINOPHIL NFR BLD: 1 % (ref 0–8)
ERYTHROCYTE [DISTWIDTH] IN BLOOD BY AUTOMATED COUNT: 27.6 % (ref 11.5–14.5)
ERYTHROCYTE [DISTWIDTH] IN BLOOD BY AUTOMATED COUNT: 27.6 % (ref 11.5–14.5)
ERYTHROCYTE [DISTWIDTH] IN BLOOD BY AUTOMATED COUNT: 29.6 % (ref 11.5–14.5)
EST. GFR  (NO RACE VARIABLE): 52 ML/MIN/1.73 M^2
FOLATE SERPL-MCNC: 14.9 NG/ML (ref 4–24)
GLUCOSE SERPL-MCNC: 94 MG/DL (ref 70–110)
HAPTOGLOB SERPL-MCNC: 369 MG/DL (ref 30–250)
HCT VFR BLD AUTO: 22.3 % (ref 40–54)
HCT VFR BLD AUTO: 22.6 % (ref 40–54)
HCT VFR BLD AUTO: 28.2 % (ref 40–54)
HGB BLD-MCNC: 6.4 G/DL (ref 14–18)
HGB BLD-MCNC: 6.7 G/DL (ref 14–18)
HGB BLD-MCNC: 8.3 G/DL (ref 14–18)
IMM GRANULOCYTES # BLD AUTO: 0.06 K/UL (ref 0–0.04)
IMM GRANULOCYTES # BLD AUTO: 0.08 K/UL (ref 0–0.04)
IMM GRANULOCYTES NFR BLD AUTO: 0.5 % (ref 0–0.5)
IMM GRANULOCYTES NFR BLD AUTO: 0.6 % (ref 0–0.5)
INR PPP: 1.1 (ref 0.8–1.2)
LYMPHOCYTES # BLD AUTO: 1.2 K/UL (ref 1–4.8)
LYMPHOCYTES # BLD AUTO: 1.5 K/UL (ref 1–4.8)
LYMPHOCYTES NFR BLD: 10.8 % (ref 18–48)
LYMPHOCYTES NFR BLD: 11 % (ref 18–48)
MCH RBC QN AUTO: 19.8 PG (ref 27–31)
MCH RBC QN AUTO: 20.2 PG (ref 27–31)
MCH RBC QN AUTO: 21.2 PG (ref 27–31)
MCHC RBC AUTO-ENTMCNC: 28.7 G/DL (ref 32–36)
MCHC RBC AUTO-ENTMCNC: 29.4 G/DL (ref 32–36)
MCHC RBC AUTO-ENTMCNC: 29.6 G/DL (ref 32–36)
MCV RBC AUTO: 68 FL (ref 82–98)
MCV RBC AUTO: 69 FL (ref 82–98)
MCV RBC AUTO: 72 FL (ref 82–98)
MONOCYTES # BLD AUTO: 1.5 K/UL (ref 0.3–1)
MONOCYTES # BLD AUTO: 1.8 K/UL (ref 0.3–1)
MONOCYTES NFR BLD: 13.4 % (ref 4–15)
MONOCYTES NFR BLD: 13.9 % (ref 4–15)
NEUTROPHILS # BLD AUTO: 10 K/UL (ref 1.8–7.7)
NEUTROPHILS # BLD AUTO: 8 K/UL (ref 1.8–7.7)
NEUTROPHILS NFR BLD: 73.2 % (ref 38–73)
NEUTROPHILS NFR BLD: 73.8 % (ref 38–73)
NRBC BLD-RTO: 1 /100 WBC
NRBC BLD-RTO: 2 /100 WBC
PLATELET # BLD AUTO: 330 K/UL (ref 150–450)
PLATELET # BLD AUTO: 352 K/UL (ref 150–450)
PLATELET # BLD AUTO: 379 K/UL (ref 150–450)
PMV BLD AUTO: 8.8 FL (ref 9.2–12.9)
PMV BLD AUTO: 9.3 FL (ref 9.2–12.9)
PMV BLD AUTO: 9.3 FL (ref 9.2–12.9)
POTASSIUM SERPL-SCNC: 4.3 MMOL/L (ref 3.5–5.1)
PROT SERPL-MCNC: 6.8 G/DL (ref 6–8.4)
PROTHROMBIN TIME: 11.6 SEC (ref 9–12.5)
RBC # BLD AUTO: 3.24 M/UL (ref 4.6–6.2)
RBC # BLD AUTO: 3.31 M/UL (ref 4.6–6.2)
RBC # BLD AUTO: 3.92 M/UL (ref 4.6–6.2)
SODIUM SERPL-SCNC: 139 MMOL/L (ref 136–145)
TRANS ERYTHROCYTES VOL PATIENT: NORMAL ML
TROPONIN I SERPL DL<=0.01 NG/ML-MCNC: 0.02 NG/ML (ref 0–0.03)
VIT B12 SERPL-MCNC: 1111 PG/ML (ref 210–950)
WBC # BLD AUTO: 10.92 K/UL (ref 3.9–12.7)
WBC # BLD AUTO: 12.4 K/UL (ref 3.9–12.7)
WBC # BLD AUTO: 13.56 K/UL (ref 3.9–12.7)

## 2023-09-28 PROCEDURE — 11000001 HC ACUTE MED/SURG PRIVATE ROOM

## 2023-09-28 PROCEDURE — 82607 VITAMIN B-12: CPT | Performed by: STUDENT IN AN ORGANIZED HEALTH CARE EDUCATION/TRAINING PROGRAM

## 2023-09-28 PROCEDURE — 82746 ASSAY OF FOLIC ACID SERUM: CPT | Performed by: STUDENT IN AN ORGANIZED HEALTH CARE EDUCATION/TRAINING PROGRAM

## 2023-09-28 PROCEDURE — 25000003 PHARM REV CODE 250: Performed by: STUDENT IN AN ORGANIZED HEALTH CARE EDUCATION/TRAINING PROGRAM

## 2023-09-28 PROCEDURE — 84484 ASSAY OF TROPONIN QUANT: CPT | Performed by: STUDENT IN AN ORGANIZED HEALTH CARE EDUCATION/TRAINING PROGRAM

## 2023-09-28 PROCEDURE — 63600175 PHARM REV CODE 636 W HCPCS: Performed by: STUDENT IN AN ORGANIZED HEALTH CARE EDUCATION/TRAINING PROGRAM

## 2023-09-28 PROCEDURE — P9021 RED BLOOD CELLS UNIT: HCPCS | Performed by: STUDENT IN AN ORGANIZED HEALTH CARE EDUCATION/TRAINING PROGRAM

## 2023-09-28 PROCEDURE — 85610 PROTHROMBIN TIME: CPT | Performed by: INTERNAL MEDICINE

## 2023-09-28 PROCEDURE — C9113 INJ PANTOPRAZOLE SODIUM, VIA: HCPCS | Performed by: STUDENT IN AN ORGANIZED HEALTH CARE EDUCATION/TRAINING PROGRAM

## 2023-09-28 PROCEDURE — 85027 COMPLETE CBC AUTOMATED: CPT

## 2023-09-28 PROCEDURE — 80053 COMPREHEN METABOLIC PANEL: CPT | Performed by: STUDENT IN AN ORGANIZED HEALTH CARE EDUCATION/TRAINING PROGRAM

## 2023-09-28 PROCEDURE — 36415 COLL VENOUS BLD VENIPUNCTURE: CPT | Performed by: STUDENT IN AN ORGANIZED HEALTH CARE EDUCATION/TRAINING PROGRAM

## 2023-09-28 PROCEDURE — 94761 N-INVAS EAR/PLS OXIMETRY MLT: CPT

## 2023-09-28 PROCEDURE — 83880 ASSAY OF NATRIURETIC PEPTIDE: CPT | Performed by: STUDENT IN AN ORGANIZED HEALTH CARE EDUCATION/TRAINING PROGRAM

## 2023-09-28 PROCEDURE — 85025 COMPLETE CBC W/AUTO DIFF WBC: CPT | Mod: 91 | Performed by: STUDENT IN AN ORGANIZED HEALTH CARE EDUCATION/TRAINING PROGRAM

## 2023-09-28 PROCEDURE — 36415 COLL VENOUS BLD VENIPUNCTURE: CPT

## 2023-09-28 PROCEDURE — 25000003 PHARM REV CODE 250

## 2023-09-28 PROCEDURE — 96376 TX/PRO/DX INJ SAME DRUG ADON: CPT

## 2023-09-28 RX ORDER — HYDROCODONE BITARTRATE AND ACETAMINOPHEN 500; 5 MG/1; MG/1
TABLET ORAL
Status: DISCONTINUED | OUTPATIENT
Start: 2023-09-28 | End: 2023-09-30 | Stop reason: HOSPADM

## 2023-09-28 RX ORDER — POLYETHYLENE GLYCOL 3350, SODIUM SULFATE ANHYDROUS, SODIUM BICARBONATE, SODIUM CHLORIDE, POTASSIUM CHLORIDE 236; 22.74; 6.74; 5.86; 2.97 G/4L; G/4L; G/4L; G/4L; G/4L
4000 POWDER, FOR SOLUTION ORAL ONCE
Status: COMPLETED | OUTPATIENT
Start: 2023-09-28 | End: 2023-09-28

## 2023-09-28 RX ORDER — DIPHENHYDRAMINE HCL 25 MG
25 CAPSULE ORAL ONCE
Status: COMPLETED | OUTPATIENT
Start: 2023-09-28 | End: 2023-09-28

## 2023-09-28 RX ADMIN — POLYETHYLENE GLYCOL 3350, SODIUM SULFATE ANHYDROUS, SODIUM BICARBONATE, SODIUM CHLORIDE, POTASSIUM CHLORIDE 4000 ML: 236; 22.74; 6.74; 5.86; 2.97 POWDER, FOR SOLUTION ORAL at 08:09

## 2023-09-28 RX ADMIN — DIPHENHYDRAMINE HYDROCHLORIDE 25 MG: 25 CAPSULE ORAL at 01:09

## 2023-09-28 RX ADMIN — PANTOPRAZOLE SODIUM 40 MG: 40 INJECTION, POWDER, FOR SOLUTION INTRAVENOUS at 10:09

## 2023-09-28 RX ADMIN — PANTOPRAZOLE SODIUM 40 MG: 40 INJECTION, POWDER, FOR SOLUTION INTRAVENOUS at 05:09

## 2023-09-28 NOTE — PLAN OF CARE
SW met with pt at bedside to complete assessment. Pt is Alert and able to verbally answer assessment questions.  Pt confirmed demographics. Pt reports to live at home with his daughter Tiffanie. Pt reports while at home being able to bathe and clothe himself taking his time. Pt reports having a rollator at home. Pt reports unable to drive but daughter driving and assisting him. Pt daughter to transport home at time of discharge. SW updated whiteboard with CM name and contact information. SW confirmed pt understanding of Observation unit and expected discharge plan. SW will continue to follow pt throughout care and assist with any discharge needs.         09/28/23 4080   Discharge Planning   Assessment Type Discharge Planning Brief Assessment   Resource/Environmental Concerns none   Support Systems Children;Family members   Equipment Currently Used at Home rollator   Current Living Arrangements home   Patient/Family Anticipates Transition to home with family   Patient/Family Anticipated Services at Transition none   DME Needed Upon Discharge  none   Discharge Plan A Home with family     No future appointments.    TRUN[facility] Case Management  [phone]

## 2023-09-28 NOTE — PLAN OF CARE
09/27/23 2203   Admission   Initial VN Admission Questions Complete   Communication Issues? None   Shift   Pain Management Interventions quiet environment facilitated;relaxation techniques promoted   Virtual Nurse - Patient Verbalized Approval Of VN Rounding;Camera Use   Type of Frequent Check   Type Telemetry Monitoring;Patient Rounds   Safety/Activity   Patient Rounds bed in low position;bed wheels locked;call light in patient/parent reach;clutter free environment maintained;ID band on;visualized patient;placement of personal items at bedside   Safety Promotion/Fall Prevention assistive device/personal item within reach;bed alarm set;room near unit station;nonskid shoes/socks when out of bed;side rails raised x 2;instructed to call staff for mobility   Safety Precautions emergency equipment at bedside   Safety Bands on Patient Fall Risk Band   Activity Management Ambulated -L4   Activity Assistance Provided assistance, stand-by   Elimination Assistance urinal provided   Positioning   Body Position position changed independently   Head of Bed (HOB) Positioning HOB elevated   Positioning/Transfer Devices pillows;in use      VN cued into room to complete admit assessment. VIP model introduced; VN working alongside bedside treatment team.  Plan of care reviewed with patient. Patient informed of fall risk, fall precautions, call light within reach, side rails x2 elevated. Patient notified to ask staff for assistance. Patient verbalized complete understanding. Time allowed for questions. Will continue to monitor and intervene as needed.

## 2023-09-28 NOTE — CONSULTS
"[facility]    HPI 89 y.o. male with a PMHx of HTN, GERD, and history of CVA on Plavix who presents to the hospital due to progressive weakness and dyspnea on exertion over the last week.    Patient states he was also having abdominal discomfort at the same time his weakness began, so he began to take peptobismol. When taking peptobismol, stools have been black, although when he stops they become brown again. PCP appointment on Monday, his labs were revealing of low blood counts and he was told to come to the hospital.    He has not noticed blood in his stool or when wiping at home, but he states he did noticed streaks of blood on the stool and blood when wiping at the hospital. Patient has had some unintentional weight loss although unaware over how long.     Denies nausea, vomiting, hematemesis, chest pain, abdominal pain/cramping, or hematochezia. Denies NSAID use, and quit smoking in 1950. Denies family or personal history of colon cancer. Patient does not recall ever receiving an EGD or Colonoscopy, and there are no records on chart review.        Past Medical History  Arthritis  Bell's Palsy  DVT  Cataract  GERD  Hypertension   Hyperlipidemia  Stroke  TIA    Review of Systems  Gastrointestinal ROS: no abdominal pain, + change in bowel habits    Physical Examination  /60 (Patient Position: Lying)   Pulse 63   Temp 97.6 °F (36.4 °C) (Oral)   Resp 18   Ht 5' 8" (1.727 m)   Wt 96.9 kg (213 lb 10 oz)   SpO2 95%   BMI 32.48 kg/m²   General appearance: alert, cooperative, no distress  HENT: Normocephalic, atraumatic, neck symmetrical, no nasal discharge   Lungs: clear to auscultation bilaterally  Heart: regular rate and rhythm without rub; no displacement of the PMI   Abdomen: soft, non-tender; bowel sounds normoactive; no organomegaly  Extremities: bilateral lower extremity edema  Neurologic: Alert and oriented X 3    Labs:  Hgb: 6.4 (s/p 2 units pRBCs)    Iron Panel:   Iron <10  TIBC 462  " Ferritin 5    Previous medical records reviewed including external documents   CT imaging of the abdomen (2022) - images reviewed and revealing for renal mass - needs follow up evaluation     Assessment:   89 yo male with a PMHx of HTN, GERD, and CVA on Plavix who presents for severe symptomatic iron deficiency anemia associated with dark stool. Hgb 4.6 on admission and given two units of pRBCs. Patient with likely upper source of GIB although does take PeptoBismol regularly, so lower source possible. Ddx: esophagitis, gastric vs duodenal ulcer, angioectasia, dieulafoy lesion, malignancy. This is an acute severe illness requiring hospitalization     Plan:  - NPO at midnight for EGD/Colonoscopy tomorrow  - Oral bowel prep overnight in preparation for endoscopy tomorrow   - Transfusion goal is >7 or otherwise determined by co-morbidities  - IV PPI BID  - Repeat CBC in AM   - Plavix Tuesday morning    Mike Blanco MD  [facility], -[facility]

## 2023-09-28 NOTE — PROGRESS NOTES
"[facility] Medicine Progress Note    Primary Team: [facility] Hospitalist Team B  Attending Physician: Ronny Tai MD  Resident: Antony  Intern: Arturo    Date of Admit: 2023     Chief Complaint: Weakness x 3 days    Subjective/Interval Events:      Patient examined at bedside this morning. Reports feeling better today after receiving 2 units of blood overnight. Had a bowel movement earlier in the morning with red streaks but no clots. No nausea, vomiting, SOB, chest pain or other symptoms reported.      Objective:   Last 24 Hour Vital Signs:  BP  Min: 115/57  Max: 154/70  Temp  Av.8 °F (36.6 °C)  Min: 96.3 °F (35.7 °C)  Max: 99.3 °F (37.4 °C)  Pulse  Av  Min: 63  Max: 81  Resp  Av.6  Min: 16  Max: 18  SpO2  Av.2 %  Min: 92 %  Max: 100 %  Height  Av' 8" (172.7 cm)  Min: 5' 8" (172.7 cm)  Max: 5' 8" (172.7 cm)  Weight  Av.8 kg (213 lb 5 oz)  Min: 96.6 kg (213 lb)  Max: 96.9 kg (213 lb 10 oz)    Intake/Output Summary (Last 24 hours) at 2023 1249  Last data filed at 2023 0401  Gross per 24 hour   Intake --   Output 300 ml   Net -300 ml       Physical Examination:  Physical Exam  Constitutional:       Appearance: Normal appearance.   Eyes:      Extraocular Movements: Extraocular movements intact.      Pupils: Pupils are equal, round, and reactive to light.   Cardiovascular:      Rate and Rhythm: Normal rate and regular rhythm.   Pulmonary:      Effort: Pulmonary effort is normal.   Abdominal:      General: There is distension.   Musculoskeletal:         General: Tenderness present.      Right lower leg: Edema present.      Left lower leg: Edema present.      Comments: Tenderness in B/L LE (L>R)   Skin:     General: Skin is warm.      Capillary Refill: Capillary refill takes less than 2 seconds.   Neurological:      General: No focal deficit present.      Mental Status: He is alert and oriented to person, place, and time.      Cranial Nerves: Cranial nerve deficit present.     "  Sensory: Sensory deficit present.      Motor: Weakness present.      Comments: Hx of Stroke - left sided deficits  Face : slight left hemiplegia, left CN VII paresis  Motor : good tone, 5/5 B/L UE & LE   Sensory : 2/2 B/L UE, 1/2 LLE    Psychiatric:         Mood and Affect: Mood normal.         Behavior: Behavior normal.      Laboratory:  Recent Labs   Lab 09/27/23  1357 09/28/23  0112 09/28/23  0429   WBC 8.45 12.40 10.92   HGB 4.6* 6.7* 6.4*   HCT 17.7* 22.6* 22.3*    330 379   MCV 62* 68* 69*   RDW 21.9* 27.6* 27.6*     --  139   K 4.4  --  4.3     --  105   CO2 23  --  24   BUN 42*  --  34*   CREATININE 1.6*  --  1.3     --  94   PROT 7.5  --  6.8   ALBUMIN 3.4*  --  3.1*   BILITOT 0.3  --  1.1*   AST 54*  --  47*   ALKPHOS 67  --  59   ALT 30  --  26       Microbiology:  none    Imaging:  none    Current Medications:     Scheduled:   atorvastatin  20 mg Oral Daily    oxybutynin  10 mg Oral Daily    pantoprazole  40 mg Intravenous BID         Infusions:       PRN:  0.9%  NaCl infusion (for blood administration), 0.9%  NaCl infusion (for blood administration), sodium chloride 0.9%    Assessment:     Lon Chin Jr. is a 89 y.o. male with a PMH of Bell's palsy, TIA, stroke, HTN, GERD, HDL presenting with increasing dyspnea and fatigue for the past 3 days. He endorses dark stool and frequent use of pepto bismol. In the ED he was found to have a H/H of 4.2/18.2 prompting repletion of blood and starting treatment for symptomatic anemia.    Plan:     Symptomatic anemia  - H/H = 4.6/17.7 on admission  - FOBT - negative  - History of Bismuth subsalicylate use with positive symptoms (dark stool, fatigue, abdominal tenderness)  - Iron low, TIBC high, Ferritin low - indicative of iron deficiency anemia  - Transfused with 2 units RBC last night  PLAN :   - Transfuse 1 unit RBC  - Continue PPI   - Trend H/H  - Consulted GI, appreciate recs     Cerebral Vascular Accident  - Past history of  Stroke and TIA with left sided deficits (see physical exam)  PLAN :  - Observe for changes in mentation or new neurological deficits     Hyperlipidemia  - Restart home Atorvastatin 20 mg     Hypertension  - Hypertensive while in the ED  - Holding home meds       Diet: Regular  Code: Full   Dispo: pending GI workup      Kaylen Morris DO  PGY-1 [facility]  [facility]      [facility] Hospitalist Pager numbers:   [facility] Medicine Team A (Naheed/Cheyenne): [phone]  [facility] Hospitalist Medicine Team B (Gillian/Abida):  [phone]

## 2023-09-28 NOTE — ED NOTES
Lab phlebotomist at bedside for blood draw. Reports that patient cannot have blood drawn at this time due to blood transfusing.

## 2023-09-28 NOTE — PLAN OF CARE
"  Problem: Adult Inpatient Plan of Care  Goal: Plan of Care Review  Outcome: Ongoing, Progressing     Problem: Adult Inpatient Plan of Care  Goal: Optimal Comfort and Wellbeing  Outcome: Ongoing, Progressing     Problem: Anemia  Goal: Anemia Symptom Improvement  Outcome: Ongoing, Progressing     Problem: Fall Injury Risk  Goal: Absence of Fall and Fall-Related Injury  Outcome: Ongoing, Progressing     Patient got in from the ED @ 2040 hrs. Oriented the patient to the unit and the nurse call button. Vital signs assessed and documented as on the flow chart. Plan of care reviewed with the patient. Scheduled medicines given and the patient tolerated well. Fall and safety precautions taken and the standard interventions are in place. On Telemetry monitoring with NSR, no true "red alarms' noted, no acute distress reported either in the shift. Advised the patient to call for the assistance. Continued monitoring the patient.   "

## 2023-09-29 ENCOUNTER — ANESTHESIA EVENT (OUTPATIENT)
Dept: ENDOSCOPY | Facility: HOSPITAL | Age: 88
DRG: 378 | End: 2023-09-29
Payer: COMMERCIAL

## 2023-09-29 ENCOUNTER — ANESTHESIA (OUTPATIENT)
Dept: ENDOSCOPY | Facility: HOSPITAL | Age: 88
DRG: 378 | End: 2023-09-29
Payer: COMMERCIAL

## 2023-09-29 PROBLEM — D50.9 IRON DEFICIENCY ANEMIA: Status: ACTIVE | Noted: 2023-09-29

## 2023-09-29 LAB
ALBUMIN SERPL BCP-MCNC: 3.1 G/DL (ref 3.5–5.2)
ALP SERPL-CCNC: 71 U/L (ref 55–135)
ALT SERPL W/O P-5'-P-CCNC: 26 U/L (ref 10–44)
ANION GAP SERPL CALC-SCNC: 11 MMOL/L (ref 8–16)
AST SERPL-CCNC: 39 U/L (ref 10–40)
BASOPHILS # BLD AUTO: 0.04 K/UL (ref 0–0.2)
BASOPHILS NFR BLD: 0.4 % (ref 0–1.9)
BILIRUB SERPL-MCNC: 0.5 MG/DL (ref 0.1–1)
BUN SERPL-MCNC: 21 MG/DL (ref 8–23)
CALCIUM SERPL-MCNC: 8.4 MG/DL (ref 8.7–10.5)
CHLORIDE SERPL-SCNC: 107 MMOL/L (ref 95–110)
CO2 SERPL-SCNC: 23 MMOL/L (ref 23–29)
CREAT SERPL-MCNC: 1.1 MG/DL (ref 0.5–1.4)
DIFFERENTIAL METHOD: ABNORMAL
EOSINOPHIL # BLD AUTO: 0.2 K/UL (ref 0–0.5)
EOSINOPHIL NFR BLD: 2.3 % (ref 0–8)
ERYTHROCYTE [DISTWIDTH] IN BLOOD BY AUTOMATED COUNT: 29.1 % (ref 11.5–14.5)
EST. GFR  (NO RACE VARIABLE): >60 ML/MIN/1.73 M^2
GLUCOSE SERPL-MCNC: 93 MG/DL (ref 70–110)
HCT VFR BLD AUTO: 25.5 % (ref 40–54)
HGB BLD-MCNC: 7.7 G/DL (ref 14–18)
IMM GRANULOCYTES # BLD AUTO: 0.04 K/UL (ref 0–0.04)
IMM GRANULOCYTES NFR BLD AUTO: 0.4 % (ref 0–0.5)
LYMPHOCYTES # BLD AUTO: 1.2 K/UL (ref 1–4.8)
LYMPHOCYTES NFR BLD: 12.9 % (ref 18–48)
MCH RBC QN AUTO: 21.4 PG (ref 27–31)
MCHC RBC AUTO-ENTMCNC: 30.2 G/DL (ref 32–36)
MCV RBC AUTO: 71 FL (ref 82–98)
MONOCYTES # BLD AUTO: 1.5 K/UL (ref 0.3–1)
MONOCYTES NFR BLD: 16.3 % (ref 4–15)
NEUTROPHILS # BLD AUTO: 6.1 K/UL (ref 1.8–7.7)
NEUTROPHILS NFR BLD: 67.7 % (ref 38–73)
NRBC BLD-RTO: 1 /100 WBC
PLATELET # BLD AUTO: 310 K/UL (ref 150–450)
PMV BLD AUTO: 9 FL (ref 9.2–12.9)
POTASSIUM SERPL-SCNC: 3.8 MMOL/L (ref 3.5–5.1)
PROT SERPL-MCNC: 6.7 G/DL (ref 6–8.4)
RBC # BLD AUTO: 3.6 M/UL (ref 4.6–6.2)
SODIUM SERPL-SCNC: 141 MMOL/L (ref 136–145)
WBC # BLD AUTO: 9.01 K/UL (ref 3.9–12.7)

## 2023-09-29 PROCEDURE — 97162 PT EVAL MOD COMPLEX 30 MIN: CPT

## 2023-09-29 PROCEDURE — D9220A PRA ANESTHESIA: Mod: ANES,,, | Performed by: ANESTHESIOLOGY

## 2023-09-29 PROCEDURE — 45378 DIAGNOSTIC COLONOSCOPY: CPT | Mod: ,,, | Performed by: INTERNAL MEDICINE

## 2023-09-29 PROCEDURE — C9113 INJ PANTOPRAZOLE SODIUM, VIA: HCPCS | Performed by: STUDENT IN AN ORGANIZED HEALTH CARE EDUCATION/TRAINING PROGRAM

## 2023-09-29 PROCEDURE — 97165 OT EVAL LOW COMPLEX 30 MIN: CPT

## 2023-09-29 PROCEDURE — 11000001 HC ACUTE MED/SURG PRIVATE ROOM

## 2023-09-29 PROCEDURE — 88342 IMHCHEM/IMCYTCHM 1ST ANTB: CPT | Performed by: STUDENT IN AN ORGANIZED HEALTH CARE EDUCATION/TRAINING PROGRAM

## 2023-09-29 PROCEDURE — 97530 THERAPEUTIC ACTIVITIES: CPT

## 2023-09-29 PROCEDURE — D9220A PRA ANESTHESIA: Mod: CRNA,,, | Performed by: NURSE ANESTHETIST, CERTIFIED REGISTERED

## 2023-09-29 PROCEDURE — 99900035 HC TECH TIME PER 15 MIN (STAT)

## 2023-09-29 PROCEDURE — 97535 SELF CARE MNGMENT TRAINING: CPT

## 2023-09-29 PROCEDURE — 43239 EGD BIOPSY SINGLE/MULTIPLE: CPT | Performed by: INTERNAL MEDICINE

## 2023-09-29 PROCEDURE — D9220A PRA ANESTHESIA: ICD-10-PCS | Mod: ANES,,, | Performed by: ANESTHESIOLOGY

## 2023-09-29 PROCEDURE — 63600175 PHARM REV CODE 636 W HCPCS: Performed by: STUDENT IN AN ORGANIZED HEALTH CARE EDUCATION/TRAINING PROGRAM

## 2023-09-29 PROCEDURE — 36415 COLL VENOUS BLD VENIPUNCTURE: CPT | Performed by: STUDENT IN AN ORGANIZED HEALTH CARE EDUCATION/TRAINING PROGRAM

## 2023-09-29 PROCEDURE — 37000009 HC ANESTHESIA EA ADD 15 MINS: Performed by: INTERNAL MEDICINE

## 2023-09-29 PROCEDURE — 88305 TISSUE EXAM BY PATHOLOGIST: CPT | Performed by: STUDENT IN AN ORGANIZED HEALTH CARE EDUCATION/TRAINING PROGRAM

## 2023-09-29 PROCEDURE — 80053 COMPREHEN METABOLIC PANEL: CPT | Performed by: STUDENT IN AN ORGANIZED HEALTH CARE EDUCATION/TRAINING PROGRAM

## 2023-09-29 PROCEDURE — 37000008 HC ANESTHESIA 1ST 15 MINUTES: Performed by: INTERNAL MEDICINE

## 2023-09-29 PROCEDURE — 88342 CHG IMMUNOCYTOCHEMISTRY: ICD-10-PCS | Mod: 26,,, | Performed by: STUDENT IN AN ORGANIZED HEALTH CARE EDUCATION/TRAINING PROGRAM

## 2023-09-29 PROCEDURE — 45378 DIAGNOSTIC COLONOSCOPY: CPT | Performed by: INTERNAL MEDICINE

## 2023-09-29 PROCEDURE — 63600175 PHARM REV CODE 636 W HCPCS: Performed by: NURSE ANESTHETIST, CERTIFIED REGISTERED

## 2023-09-29 PROCEDURE — 88305 TISSUE EXAM BY PATHOLOGIST: CPT | Mod: 26,,, | Performed by: STUDENT IN AN ORGANIZED HEALTH CARE EDUCATION/TRAINING PROGRAM

## 2023-09-29 PROCEDURE — 45378 PR COLONOSCOPY,DIAGNOSTIC: ICD-10-PCS | Mod: ,,, | Performed by: INTERNAL MEDICINE

## 2023-09-29 PROCEDURE — 96376 TX/PRO/DX INJ SAME DRUG ADON: CPT

## 2023-09-29 PROCEDURE — 97116 GAIT TRAINING THERAPY: CPT

## 2023-09-29 PROCEDURE — 43239 EGD BIOPSY SINGLE/MULTIPLE: CPT | Mod: 51,,, | Performed by: INTERNAL MEDICINE

## 2023-09-29 PROCEDURE — 25000003 PHARM REV CODE 250: Performed by: STUDENT IN AN ORGANIZED HEALTH CARE EDUCATION/TRAINING PROGRAM

## 2023-09-29 PROCEDURE — 94761 N-INVAS EAR/PLS OXIMETRY MLT: CPT

## 2023-09-29 PROCEDURE — 88305 TISSUE EXAM BY PATHOLOGIST: ICD-10-PCS | Mod: 26,,, | Performed by: STUDENT IN AN ORGANIZED HEALTH CARE EDUCATION/TRAINING PROGRAM

## 2023-09-29 PROCEDURE — D9220A PRA ANESTHESIA: ICD-10-PCS | Mod: CRNA,,, | Performed by: NURSE ANESTHETIST, CERTIFIED REGISTERED

## 2023-09-29 PROCEDURE — 43239 PR EGD, FLEX, W/BIOPSY, SGL/MULTI: ICD-10-PCS | Mod: 51,,, | Performed by: INTERNAL MEDICINE

## 2023-09-29 PROCEDURE — 85025 COMPLETE CBC W/AUTO DIFF WBC: CPT | Performed by: STUDENT IN AN ORGANIZED HEALTH CARE EDUCATION/TRAINING PROGRAM

## 2023-09-29 PROCEDURE — 88342 IMHCHEM/IMCYTCHM 1ST ANTB: CPT | Mod: 26,,, | Performed by: STUDENT IN AN ORGANIZED HEALTH CARE EDUCATION/TRAINING PROGRAM

## 2023-09-29 PROCEDURE — 25000003 PHARM REV CODE 250: Performed by: NURSE ANESTHETIST, CERTIFIED REGISTERED

## 2023-09-29 RX ORDER — PROPOFOL 10 MG/ML
VIAL (ML) INTRAVENOUS CONTINUOUS PRN
Status: DISCONTINUED | OUTPATIENT
Start: 2023-09-29 | End: 2023-09-29

## 2023-09-29 RX ORDER — PROPOFOL 10 MG/ML
VIAL (ML) INTRAVENOUS
Status: DISCONTINUED | OUTPATIENT
Start: 2023-09-29 | End: 2023-09-29

## 2023-09-29 RX ORDER — LIDOCAINE HYDROCHLORIDE 20 MG/ML
INJECTION INTRAVENOUS
Status: DISCONTINUED | OUTPATIENT
Start: 2023-09-29 | End: 2023-09-29

## 2023-09-29 RX ADMIN — OXYBUTYNIN CHLORIDE 10 MG: 5 TABLET, EXTENDED RELEASE ORAL at 08:09

## 2023-09-29 RX ADMIN — LIDOCAINE HYDROCHLORIDE 100 MG: 20 INJECTION, SOLUTION INTRAVENOUS at 02:09

## 2023-09-29 RX ADMIN — SODIUM CHLORIDE: 0.9 INJECTION, SOLUTION INTRAVENOUS at 01:09

## 2023-09-29 RX ADMIN — GLYCOPYRROLATE 0.2 MG: 0.2 INJECTION, SOLUTION INTRAMUSCULAR; INTRAVITREAL at 01:09

## 2023-09-29 RX ADMIN — PROPOFOL 100 MCG/KG/MIN: 10 INJECTION, EMULSION INTRAVENOUS at 02:09

## 2023-09-29 RX ADMIN — TOPICAL ANESTHETIC 1 EACH: 200 SPRAY DENTAL; PERIODONTAL at 01:09

## 2023-09-29 RX ADMIN — ATORVASTATIN CALCIUM 20 MG: 20 TABLET, FILM COATED ORAL at 08:09

## 2023-09-29 RX ADMIN — PANTOPRAZOLE SODIUM 40 MG: 40 INJECTION, POWDER, FOR SOLUTION INTRAVENOUS at 09:09

## 2023-09-29 RX ADMIN — PROPOFOL 50 MG: 10 INJECTION, EMULSION INTRAVENOUS at 02:09

## 2023-09-29 NOTE — PROGRESS NOTES
[facility] Medicine Progress Note    Primary Team: [facility] Hospitalist Team B  Attending Physician: Ronny Tai MD  Resident: Antony  Intern: Arturo    Date of Admit: 2023     Chief Complaint: Weakness x 3 days    Subjective/Interval Events:      Patient examined at bedside this morning. He had an unrestful night since he was constantly going to the bathroom - he has been getting the colon prep for his colonoscopy today. Denies nausea, vomiting, SOB, chest pain. Report stool (or urine) that is lime green colored.      Objective:   Last 24 Hour Vital Signs:  BP  Min: 137/63  Max: 166/73  Temp  Av.9 °F (36.6 °C)  Min: 96.4 °F (35.8 °C)  Max: 98.6 °F (37 °C)  Pulse  Av.4  Min: 60  Max: 83  Resp  Avg: 15.9  Min: 13  Max: 18  SpO2  Av.2 %  Min: 90 %  Max: 99 %    Intake/Output Summary (Last 24 hours) at 2023 0835  Last data filed at 2023 1739  Gross per 24 hour   Intake 543.75 ml   Output 400 ml   Net 143.75 ml       Physical Examination:  Physical Exam  Constitutional:       Appearance: Normal appearance.   Eyes:      Extraocular Movements: Extraocular movements intact.      Pupils: Pupils are equal, round, and reactive to light.   Cardiovascular:      Rate and Rhythm: Normal rate and regular rhythm.   Pulmonary:      Effort: Pulmonary effort is normal.   Abdominal:      General: There is distension.   Musculoskeletal:         General: No tenderness      Right lower leg: No edema present.      Left lower leg: No edema present.   Skin:     General: Skin is warm.      Capillary Refill: Capillary refill takes less than 2 seconds.   Neurological:      General: No focal deficit present.      Mental Status: He is alert and oriented to person, place, and time.      Cranial Nerves: Cranial nerve deficit present.      Sensory: Sensory deficit present.      Motor: Weakness present.      Comments: Hx of Stroke - left sided deficits  Face : slight left hemiplegia, left CN VII paresis  Motor : good  tone, 5/5 B/L UE & LE   Sensory : 2/2 B/L UE, 1/2 LLE    Psychiatric:         Mood and Affect: Mood normal.         Behavior: Behavior normal.      Laboratory:  Recent Labs   Lab 09/27/23  1357 09/28/23  0112 09/28/23  0429 09/28/23  1655 09/29/23  0259   WBC 8.45   < > 10.92 13.56* 9.01   HGB 4.6*   < > 6.4* 8.3* 7.7*   HCT 17.7*   < > 22.3* 28.2* 25.5*      < > 379 352 310   MCV 62*   < > 69* 72* 71*   RDW 21.9*   < > 27.6* 29.6* 29.1*     --  139  --  141   K 4.4  --  4.3  --  3.8     --  105  --  107   CO2 23  --  24  --  23   BUN 42*  --  34*  --  21   CREATININE 1.6*  --  1.3  --  1.1     --  94  --  93   PROT 7.5  --  6.8  --  6.7   ALBUMIN 3.4*  --  3.1*  --  3.1*   BILITOT 0.3  --  1.1*  --  0.5   AST 54*  --  47*  --  39   ALKPHOS 67  --  59  --  71   ALT 30  --  26  --  26    < > = values in this interval not displayed.       Microbiology:  none    Imaging:  none    Current Medications:     Scheduled:   atorvastatin  20 mg Oral Daily    oxybutynin  10 mg Oral Daily    pantoprazole  40 mg Intravenous BID         Infusions:       PRN:  0.9%  NaCl infusion (for blood administration), 0.9%  NaCl infusion (for blood administration), sodium chloride 0.9%    Assessment:     Lon Chin Jr. is a 89 y.o. male with a PMH of Bell's palsy, TIA, stroke, HTN, GERD, HDL presenting with increasing dyspnea and fatigue for the past 3 days. He endorses dark stool and frequent use of pepto bismol. In the ED he was found to have a H/H of 4.2/18.2 prompting repletion of blood and starting treatment for symptomatic anemia.    Plan:     Symptomatic anemia  - H/H = 4.6/17.7 on admission  - FOBT - negative  - History of Bismuth subsalicylate use with positive symptoms (dark stool, fatigue, abdominal tenderness)  - Iron low, TIBC high, Ferritin low - indicative of iron deficiency anemia  - Transfused with 3 units RBC  PLAN :   - Continue PPI   - Trend H/H  - Consulted GI, appreciate recs  - Colonoscopy  today - follow results     Cerebral Vascular Accident  - Past history of Stroke and TIA with left sided deficits (see physical exam)  PLAN :  - Observe for changes in mentation or new neurological deficits     Hyperlipidemia  - Restart home Atorvastatin 20 mg     Hypertension  - Hypertensive while in the ED  - Holding home meds       Diet: Regular  Code: Full   Dispo: pending GI workup      Kaylen Morris DO  PGY-1 [facility]  [facility]      [facility] Hospitalist Pager numbers:   [facility] Hospitalist Medicine Team A (Naheed/Cheyenne): [phone]  [facility] Hospitalist Medicine Team B (Gillian/Abida):  [phone]

## 2023-09-29 NOTE — PLAN OF CARE
"  Problem: Adult Inpatient Plan of Care  Goal: Plan of Care Review  Outcome: Ongoing, Progressing     Problem: Adult Inpatient Plan of Care  Goal: Optimal Comfort and Wellbeing  Outcome: Ongoing, Progressing     Problem: Anemia  Goal: Anemia Symptom Improvement  Outcome: Ongoing, Progressing     Problem: Fall Injury Risk  Goal: Absence of Fall and Fall-Related Injury  Outcome: Ongoing, Progressing     .Plan of care reviewed with the patient. Scheduled medicines given and the patient tolerated well. Fall and safety precautions taken and the standard interventions are in place. On Telemetry monitoring with NSR, no true "red alarms' noted, no acute distress reported either in the shift. Patient is Observing NPO. Advised the patient to call for the assistance. Continued monitoring the patient.   "

## 2023-09-29 NOTE — PLAN OF CARE
Patient seen for physical therapy evaluation on this date.  Patient will benefit from inpatient physical therapy to address functional limitations.  Anticipate patient will be able to return home with low intensity therapy for continued rehabilitation.  Patient would also benefit from use of RW for home use.      Problem: Physical Therapy  Goal: Physical Therapy Goal  Description: Goals to be met by: 10/29/2023     Patient will increase functional independence with mobility by performin. Sit to stand transfer with Modified [place]  2. Gait  x 200 feet with Modified [place] using Rolling Walker.   3. Ascend/descend 6 stair with Handrails Modified [place]    Outcome: Ongoing, Progressing

## 2023-09-29 NOTE — PLAN OF CARE
Problem: Adult Inpatient Plan of Care  Goal: Plan of Care Review  9/28/2023 2218 by Rosa Brooks RN  Outcome: Ongoing, Progressing   Chart reviewed.

## 2023-09-29 NOTE — PT/OT/SLP EVAL
"Physical Therapy Evaluation    Patient Name:  Lon Chin Jr.   MRN:  [id number]    Recommendations:     Discharge Recommendations: home   Discharge Equipment Recommendations: walker, rolling   Barriers to discharge: None    Assessment:     Lon Chin Jr. is a 89 y.o. male admitted with a medical diagnosis of Symptomatic anemia.  He presents with the following impairments/functional limitations: impaired functional mobility, impaired cardiopulmonary response to activity, impaired endurance, gait instability, impaired balance, decreased lower extremity function     Patient seen for physical therapy evaluation on this date.  Patient will benefit from inpatient physical therapy to address functional limitations.  Anticipate patient will be able to return home with low intensity therapy for continued rehabilitation.  Patient would also benefit from use of RW for home use..    Rehab Prognosis: Good; patient would benefit from acute skilled PT services to address these deficits and reach maximum level of function.    Recent Surgery: Procedure(s) (LRB):  EGD (ESOPHAGOGASTRODUODENOSCOPY) (N/A)  COLONOSCOPY (N/A) Day of Surgery    Plan:     During this hospitalization, patient to be seen 3 x/week to address the identified rehab impairments via gait training, therapeutic activities, therapeutic exercises, neuromuscular re-education and progress toward the following goals:    Plan of Care Expires:  10/29/23    Subjective     Chief Complaint: "I am feeling better since I got blood ... I am starving though"  (awaiting colonoscopy today)  Patient/Family Comments/goals: To return to PLOF  Pain/Comfort:  Pain Rating 1: 0/10  Pain Rating Post-Intervention 1: 0/10    Patients cultural, spiritual, Catholic conflicts given the current situation: no    Living Environment:  Patient lives in 1  with daughter, no AIDEN, [facility] with shower chair in bathroom  Prior to admission, patients level of function was Modified Independent with " ADLs, - Driving, Patient states he was walking with a rollator recently due to exhaustion.  Equipment used at home: grab bar, shower chair, cane, straight, rollator, wheelchair.  DME owned (not currently used): single point cane and wheelchair.  Upon discharge, patient will have assistance from daughter and son.    Objective:     Communicated with nurse prior to session.  Patient found supine with bed alarm, telemetry, peripheral IV  upon PT entry to room.    General Precautions: Standard, fall  Orthopedic Precautions:N/A   Braces: N/A  Respiratory Status: Room air    Exams:  Cognitive Exam:  Patient is oriented to Person, Place, Time, Situation, and follows commands  Gross Motor Coordination:  WFL  Postural Exam:  Patient presented with the following abnormalities:    -       Rounded shoulders  -       Forward head  Sensation:    -       Intact  light/touch B LEs  Skin Integrity/Edema:      -       Skin integrity: Visible skin intact  -       Edema: Mild R LE> L LE  RLE ROM: WFL  RLE Strength: WFL  LLE ROM: WFL  LLE Strength: WFL    Functional Mobility:  Bed Mobility:     Rolling Left:  modified independence  Supine to Sit: modified independence  Sit to Supine: modified independence  Transfers:     Sit to Stand:  contact guard assistance with no AD  Gait: 1. Patient initially attempts gait in room with no AD, min assist, slightly unsteady.   2.  Gait with RW x 200' with Modified [place], flexed trunk, forward head posture  Balance: Seated:  no LOB EOB    Standing: increased balance with use of RW, unsteady with no AD      AM-PAC 6 CLICK MOBILITY  Total Score:19       Treatment & Education:  Patient educated on role of therapy and goals.  Patient's gait requires RW, trunk flexed and forward head.      Patient left sitting edge of bed with all lines intact, call button in reach, and OT present.    GOALS:   Multidisciplinary Problems       Physical Therapy Goals          Problem: Physical Therapy    Goal Priority  Disciplines Outcome Goal Variances Interventions   Physical Therapy Goal     PT, PT/OT Ongoing, Progressing     Description: Goals to be met by: 10/29/2023     Patient will increase functional independence with mobility by performin. Sit to stand transfer with Modified [place]  2. Gait  x 200 feet with Modified [place] using Rolling Walker.   3. Ascend/descend 6 stair with Handrails Modified [place]                         History:     Past Medical History:   Diagnosis Date    Arthritis     In back, neck    Bell's palsy feb or 2013    Blood clot in vein     right leg after back surgery    Cataract associated with other syndromes     GERD (gastroesophageal reflux disease)     Hypertension     Other and unspecified hyperlipidemia     Stroke     TIA (transient ischemic attack)     Feb or 2013       Past Surgical History:   Procedure Laterality Date    APPENDECTOMY      BACK SURGERY      PROSTATE SURGERY         Time Tracking:     PT Received On: 23  PT Start Time: 1210     PT Stop Time: 1235  PT Total Time (min): 25 min     Billable Minutes: Evaluation 15 and Gait Training 10      2023

## 2023-09-29 NOTE — PT/OT/SLP EVAL
Occupational Therapy   Evaluation and Discharge Note    Name: Lon Chin Jr.  MRN: [id number]  Admitting Diagnosis: Symptomatic anemia  Recent Surgery: Procedure(s) (LRB):  EGD (ESOPHAGOGASTRODUODENOSCOPY) (N/A)  COLONOSCOPY (N/A) Day of Surgery    Recommendations:     Discharge Recommendations: home  Discharge Equipment Recommendations: hip kit  Barriers to discharge:  None    Assessment:     Pt w/o signif change in fxnl status from baseline & no further acute OT svcs indicated at this time. DC acute OT.    Lon Chin Jr. is a 89 y.o. male with a medical diagnosis of Symptomatic anemia. At this time, patient is functioning at their prior level of function and does not require further acute OT services.     Plan:     During this hospitalization, patient does not require further acute OT services.  Please re-consult if situation changes.    Plan of Care Reviewed with: patient    Subjective     Chief Complaint: weakness  Patient/Family Comments/goals: return home    Occupational Profile:  Living Environment: w/ dgtr in H w/ 0STE; WIS w/ GB & sh ch  Previous level of function: MI via rollator  Roles and Routines: light IADLs  Equipment Used at home: grab bar, shower chair, cane, straight, rollator, wheelchair  Assistance upon Discharge: fly    Pain/Comfort:  Pain Rating 1: 0/10  Pain Rating Post-Intervention 1: 0/10    Patients cultural, spiritual, Congregation conflicts given the current situation:      Objective:     Communicated with: nsg prior to session.  Patient found sitting edge of bed with bed alarm, telemetry, peripheral IV upon OT entry to room.    General Precautions: Standard, fall  Orthopedic Precautions: N/A  Braces: N/A  Respiratory Status: Room air     Occupational Performance:    Bed Mobility:        Functional Mobility/Transfers:  Patient completed Sit <> Stand Transfer with supervision  with  no assistive device   Patient completed Toilet Transfer Step Transfer technique with supervision with   no AD  Functional Mobility: w/o DME for short distance w/in room w/ Sup    Activities of Daily Living:  Upper Body Dressing: modified independence don/doff tshirt at EOB  Lower Body Dressing: supervision and stand by assistance using AE  Toileting: modified independence and supervision on toilet    Cognitive/Visual Perceptual:  AO4    No signif deficits noted    Physical Exam:  BUEs WFL at 4+/5    Sit balance: G  Stand balance: G-    AMPAC 6 Click ADL:  AMPA Total Score: 24    Treatment & Education:  Pt found sitting EOB w/ PT & agreeable to OT eval/tx this date.  Pt AO4 & perf the following:  -toilet t/f w/ Sup w/o DME & toileting w/ Sup-MI  -don/doff pullover tshirt w/ MI  -pt demo'ed difficulty w/ don/doff socks; OT instruct/demo w/ return demo of safe/proper use to don/doff socks & to thread LEs into undergarments using hip kit AE; provided pt w/ handouts w/ product info for procurement   Edu/tx re: general safety techs & HEP. Pt verbalized understanding.    Patient left sitting edge of bed with all lines intact, call button in reach, and nsg notified    GOALS:   Multidisciplinary Problems       Occupational Therapy Goals       Not on file              Multidisciplinary Problems (Resolved)          Problem: Occupational Therapy    Goal Priority Disciplines Outcome Interventions   Occupational Therapy Goal   (Resolved)     OT, PT/OT Met                        History:     Past Medical History:   Diagnosis Date    Arthritis     In back, neck    Bell's palsy feb or march 2013    Blood clot in vein     right leg after back surgery    Cataract associated with other syndromes     GERD (gastroesophageal reflux disease)     Hypertension     Other and unspecified hyperlipidemia     Stroke     TIA (transient ischemic attack)     Feb or march 2013         Past Surgical History:   Procedure Laterality Date    APPENDECTOMY      BACK SURGERY      PROSTATE SURGERY         Time Tracking:     OT Date of Treatment: 09/29/23  OT  Start Time: 1232  OT Stop Time: 1319  OT Total Time (min): 47 min    Billable Minutes:Evaluation 9  Self Care/Home Management 30  Therapeutic Activity 8  Total Time 47    9/29/2023

## 2023-09-29 NOTE — PLAN OF CARE
Problem: Occupational Therapy  Goal: Occupational Therapy Goal  Outcome: Met   Pt found sitting EOB w/ PT & agreeable to OT eval/tx this date.  Pt AO4 & perf the following:  -toilet t/f w/ Sup w/o DME & toileting w/ Sup-MI  -pt demo'ed difficulty w/ don/doff socks; OT instruct/demo w/ return demo of safe/proper use to don/doff socks & to thread LEs into undergarments using hip kit AE; provided pt w/ handouts w/ product info for procurement   Edu/tx re: general safety techs & HEP. Pt verbalized understanding.    Pt w/o signif change in fxnl status from baseline & no further acute OT svcs indicated at this time. DC acute OT.

## 2023-09-29 NOTE — PROVATION PATIENT INSTRUCTIONS
Discharge Summary/Instructions after an Endoscopic Procedure  Patient Name: Lon Chin  Patient MRN: [id number]  Patient YOB: 1933  Friday, September 29, 2023  Jan Lozano MD  Dear patient,  As a result of recent federal legislation (The Federal Cures Act), you may   receive lab or pathology results from your procedure in your [facility]   account before your physician is able to contact you. Your physician or   their representative will relay the results to you with their   recommendations at their soonest availability.  Thank you,  Your health is very important to us during the Covid Crisis. Following your   procedure today, you will receive a daily text for 2 weeks asking about   signs or symptoms of Covid 19.  Please respond to this text when you   receive it so we can follow up and keep you as safe as possible.   RESTRICTIONS:  During your procedure today, you received medications for sedation.  These   medications may affect your judgment, balance and coordination.  Therefore,   for 24 hours, you have the following restrictions:   - DO NOT drive a car, operate machinery, make legal/financial decisions,   sign important papers or drink alcohol.    ACTIVITY:  Today: no heavy lifting, straining or running due to procedural   sedation/anesthesia.  The following day: return to full activity including work.  DIET:  Eat and drink normally unless instructed otherwise.     TREATMENT FOR COMMON SIDE EFFECTS:  - Mild abdominal pain, nausea, belching, bloating or excessive gas:  rest,   eat lightly and use a heating pad.  - Sore Throat: treat with throat lozenges and/or gargle with warm salt   water.  - Because air was used during the procedure, expelling large amounts of air   from your rectum or belching is normal.  - If a bowel prep was taken, you may not have a bowel movement for 1-3 days.    This is normal.  SYMPTOMS TO WATCH FOR AND REPORT TO YOUR PHYSICIAN:  1. Abdominal pain or bloating,  other than gas cramps.  2. Chest pain.  3. Back pain.  4. Signs of infection such as: chills or fever occurring within 24 hours   after the procedure.  5. Rectal bleeding, which would show as bright red, maroon, or black stools.   (A tablespoon of blood from the rectum is not serious, especially if   hemorrhoids are present.)  6. Vomiting.  7. Weakness or dizziness.  GO DIRECTLY TO THE NEAREST EMERGENCY ROOM IF YOU HAVE ANY OF THE FOLLOWING:      Difficulty breathing              Chills and/or fever over 101 F   Persistent vomiting and/or vomiting blood   Severe abdominal pain   Severe chest pain   Black, tarry stools   Bleeding- more than one tablespoon   Any other symptom or condition that you feel may need urgent attention  Your doctor recommends these additional instructions:  If any biopsies were taken, your doctors clinic will contact you in 1 to 2   weeks with any results.  - [facility]   - Resume previous diet.   - Continue present medications.   - Repeat colonoscopy is not recommended due to current age (66 years or   older) for screening purposes.  For questions, problems or results please call your physician - Jan Lozano MD.  EMERGENCY PHONE NUMBER: [phone],  LAB RESULTS: [phone]  IF A COMPLICATION OR EMERGENCY SITUATION ARISES AND YOU ARE UNABLE TO REACH   YOUR PHYSICIAN - GO DIRECTLY TO THE EMERGENCY ROOM.  Jan Lozano MD  9/29/2023 2:31:21 PM  This report has been verified and signed electronically.  Dear patient,  As a result of recent federal legislation (The Federal Cures Act), you may   receive lab or pathology results from your procedure in your [facility]   account before your physician is able to contact you. Your physician or   their representative will relay the results to you with their   recommendations at their soonest availability.  Thank you,  PROVATION

## 2023-09-29 NOTE — ANESTHESIA POSTPROCEDURE EVALUATION
Anesthesia Post Evaluation    Patient: Lon Chin Jr.    Procedure(s) Performed: Procedure(s) (LRB):  EGD (ESOPHAGOGASTRODUODENOSCOPY) (N/A)  COLONOSCOPY (N/A)    Final Anesthesia Type: general      Patient location during evaluation: GI PACU  Patient participation: Yes- Able to Participate  Level of consciousness: awake and alert  Post-procedure vital signs: reviewed and stable  Pain management: adequate  Airway patency: patent    PONV status at discharge: No PONV  Anesthetic complications: no      Cardiovascular status: blood pressure returned to baseline and hemodynamically stable  Respiratory status: unassisted  Hydration status: euvolemic  Follow-up not needed.          Vitals Value Taken Time   /55 09/29/23 1445     09/29/23 1454   Pulse 67 09/29/23 1445   Resp 18 09/29/23 1445   SpO2 95 % 09/29/23 1445         No case tracking events are documented in the log.      Pain/Jamal Score: No data recorded

## 2023-09-29 NOTE — NURSING
RAPID RESPONSE NURSE PROACTIVE ROUNDING NOTE       Time of Visit: 0845    Admit Date: 2023  LOS: 1  Code Status: Full Code   Date of Visit: 2023  : 2/15/1933  Age: 89 y.o.  Sex: male  Race: Black or   Bed: K469/K469 A:   MRN: [id number]  Was the patient discharged from an ICU this admission? No   Was the patient discharged from a PACU within last 24 hours? No   Did the patient receive conscious sedation/general anesthesia in last 24 hours? No   Was the patient in the ED within the past 24 hours? No   Was the patient on NIPPV within the past 24 hours? No   Attending Physician: Ronny Tai MD  Primary Service: Internal Medicine   Time spent at the bedside: < 15 min    SITUATION    Notified by bedside RN via phone call  Reason for alert: PIV insertion    ASSESSMENT/INTERVENTIONS    20 g USG PIV inserted to right forearm    Discussed plan of care with bedside Patsy JOHN    Disposition:Remain in room 469    FOLLOW UP    Call back the Rapid Response NurseTeresa at [phone] for additional questions or concerns.

## 2023-09-29 NOTE — PROVATION PATIENT INSTRUCTIONS
Discharge Summary/Instructions after an Endoscopic Procedure  Patient Name: Lon Chin  Patient MRN: [id number]  Patient YOB: 1933  Friday, September 29, 2023  Jan Lozano MD  Dear patient,  As a result of recent federal legislation (The Federal Cures Act), you may   receive lab or pathology results from your procedure in your [facility]   account before your physician is able to contact you. Your physician or   their representative will relay the results to you with their   recommendations at their soonest availability.  Thank you,  Your health is very important to us during the Covid Crisis. Following your   procedure today, you will receive a daily text for 2 weeks asking about   signs or symptoms of Covid 19.  Please respond to this text when you   receive it so we can follow up and keep you as safe as possible.   RESTRICTIONS:  During your procedure today, you received medications for sedation.  These   medications may affect your judgment, balance and coordination.  Therefore,   for 24 hours, you have the following restrictions:   - DO NOT drive a car, operate machinery, make legal/financial decisions,   sign important papers or drink alcohol.    ACTIVITY:  Today: no heavy lifting, straining or running due to procedural   sedation/anesthesia.  The following day: return to full activity including work.  DIET:  Eat and drink normally unless instructed otherwise.     TREATMENT FOR COMMON SIDE EFFECTS:  - Mild abdominal pain, nausea, belching, bloating or excessive gas:  rest,   eat lightly and use a heating pad.  - Sore Throat: treat with throat lozenges and/or gargle with warm salt   water.  - Because air was used during the procedure, expelling large amounts of air   from your rectum or belching is normal.  - If a bowel prep was taken, you may not have a bowel movement for 1-3 days.    This is normal.  SYMPTOMS TO WATCH FOR AND REPORT TO YOUR PHYSICIAN:  1. Abdominal pain or bloating,  other than gas cramps.  2. Chest pain.  3. Back pain.  4. Signs of infection such as: chills or fever occurring within 24 hours   after the procedure.  5. Rectal bleeding, which would show as bright red, maroon, or black stools.   (A tablespoon of blood from the rectum is not serious, especially if   hemorrhoids are present.)  6. Vomiting.  7. Weakness or dizziness.  GO DIRECTLY TO THE NEAREST EMERGENCY ROOM IF YOU HAVE ANY OF THE FOLLOWING:      Difficulty breathing              Chills and/or fever over 101 F   Persistent vomiting and/or vomiting blood   Severe abdominal pain   Severe chest pain   Black, tarry stools   Bleeding- more than one tablespoon   Any other symptom or condition that you feel may need urgent attention  Your doctor recommends these additional instructions:  If any biopsies were taken, your doctors clinic will contact you in 1 to 2   weeks with any results.  - Discharge patient to home.   - Resume previous diet.   - Continue present medications.   - Await pathology results.   - Perform a colonoscopy as previously scheduled.  For questions, problems or results please call your physician - Jan Lozano MD.  EMERGENCY PHONE NUMBER: [phone],  LAB RESULTS: [phone]  IF A COMPLICATION OR EMERGENCY SITUATION ARISES AND YOU ARE UNABLE TO REACH   YOUR PHYSICIAN - GO DIRECTLY TO THE EMERGENCY ROOM.  Jan Lozano MD  9/29/2023 2:14:38 PM  This report has been verified and signed electronically.  Dear patient,  As a result of recent federal legislation (The Federal Cures Act), you may   receive lab or pathology results from your procedure in your [facility]   account before your physician is able to contact you. Your physician or   their representative will relay the results to you with their   recommendations at their soonest availability.  Thank you,  PROVATION

## 2023-09-29 NOTE — PLAN OF CARE
Reported off to Cipriano, v/s  98.1  67  18  125/55  95%RA. Pt. Denies any present discomforts.  Pt. Will be transported back to room 469  via stretcher.

## 2023-09-29 NOTE — OR NURSING
Patient scheduled for EGD/Colon exams today, chart reviewed and report taken from nurse.  AAO x4, NPO since approx 0600, bowel prep completed with clear results, IV in place.  Able to sign necessary consents. Telemetry monitoring in place.

## 2023-09-29 NOTE — TRANSFER OF CARE
"Anesthesia Transfer of Care Note    Patient: Lon Chin Jr.    Procedure(s) Performed: Procedure(s) (LRB):  EGD (ESOPHAGOGASTRODUODENOSCOPY) (N/A)  COLONOSCOPY (N/A)    Patient location: GI    Anesthesia Type: general    Transport from OR: Transported from OR on room air with adequate spontaneous ventilation    Post pain: adequate analgesia    Post assessment: no apparent anesthetic complications and tolerated procedure well    Post vital signs: stable    Level of consciousness: awake    Nausea/Vomiting: no nausea/vomiting    Complications: none    Transfer of care protocol was followed      Last vitals:   Visit Vitals  BP (!) 174/72 (BP Location: Left arm, Patient Position: Lying)   Pulse (!) 59   Temp 37.2 °C (99 °F) (Skin)   Resp 20   Ht 5' 8" (1.727 m)   Wt 96.9 kg (213 lb 10 oz)   SpO2 98%   BMI 32.48 kg/m²     "

## 2023-09-29 NOTE — ANESTHESIA PREPROCEDURE EVALUATION
09/29/2023  Lon Chin Jr. is a 89 y.o., male.      Pre-op Assessment     I have reviewed the Nursing Notes.    I have reviewed the Medications.     Review of Systems  Anesthesia Hx:  No problems with previous Anesthesia  Denies Family Hx of Anesthesia complications.    Social:  Non-Smoker, No Alcohol Use    Hematology/Oncology:  Hematology Normal   Oncology Normal     EENT/Dental:EENT/Dental Normal   Cardiovascular:   Exercise tolerance: good Hypertension    Pulmonary:  Pulmonary Normal    Renal/:  Renal/ Normal     Hepatic/GI:   GERD    Musculoskeletal:   Arthritis     Neurological:   TIA, CVA Neuromuscular Disease,    Endocrine:  Endocrine Normal        Physical Exam  General: Well nourished    Airway:  Mallampati: II / II  Mouth Opening: Normal  TM Distance: Normal  Tongue: Normal  Neck ROM: Normal ROM    Dental:  Intact        Anesthesia Plan  Type of Anesthesia, risks & benefits discussed:    Anesthesia Type: Gen Natural Airway  Intra-op Monitoring Plan: Standard ASA Monitors  Post Op Pain Control Plan: multimodal analgesia  Induction:  IV  Airway Plan: Direct, Post-Induction  Informed Consent: Informed consent signed with the Patient and all parties understand the risks and agree with anesthesia plan.  All questions answered.   ASA Score: 3    Ready For Surgery From Anesthesia Perspective.     .

## 2023-09-30 VITALS
TEMPERATURE: 98 F | RESPIRATION RATE: 18 BRPM | BODY MASS INDEX: 32.38 KG/M2 | WEIGHT: 213.63 LBS | HEIGHT: 68 IN | DIASTOLIC BLOOD PRESSURE: 64 MMHG | HEART RATE: 70 BPM | SYSTOLIC BLOOD PRESSURE: 132 MMHG | OXYGEN SATURATION: 99 %

## 2023-09-30 LAB
ALBUMIN SERPL BCP-MCNC: 3.2 G/DL (ref 3.5–5.2)
ALP SERPL-CCNC: 71 U/L (ref 55–135)
ALT SERPL W/O P-5'-P-CCNC: 29 U/L (ref 10–44)
ANION GAP SERPL CALC-SCNC: 12 MMOL/L (ref 8–16)
AST SERPL-CCNC: 50 U/L (ref 10–40)
BASOPHILS # BLD AUTO: 0.02 K/UL (ref 0–0.2)
BASOPHILS NFR BLD: 0.3 % (ref 0–1.9)
BILIRUB SERPL-MCNC: 0.3 MG/DL (ref 0.1–1)
BUN SERPL-MCNC: 15 MG/DL (ref 8–23)
CALCIUM SERPL-MCNC: 8.4 MG/DL (ref 8.7–10.5)
CHLORIDE SERPL-SCNC: 108 MMOL/L (ref 95–110)
CO2 SERPL-SCNC: 22 MMOL/L (ref 23–29)
CREAT SERPL-MCNC: 1.2 MG/DL (ref 0.5–1.4)
DIFFERENTIAL METHOD: ABNORMAL
EOSINOPHIL # BLD AUTO: 0.2 K/UL (ref 0–0.5)
EOSINOPHIL NFR BLD: 2.9 % (ref 0–8)
ERYTHROCYTE [DISTWIDTH] IN BLOOD BY AUTOMATED COUNT: 30.3 % (ref 11.5–14.5)
EST. GFR  (NO RACE VARIABLE): 57 ML/MIN/1.73 M^2
GLUCOSE SERPL-MCNC: 98 MG/DL (ref 70–110)
HCT VFR BLD AUTO: 28 % (ref 40–54)
HGB BLD-MCNC: 8.4 G/DL (ref 14–18)
IMM GRANULOCYTES # BLD AUTO: 0.03 K/UL (ref 0–0.04)
IMM GRANULOCYTES NFR BLD AUTO: 0.4 % (ref 0–0.5)
LYMPHOCYTES # BLD AUTO: 1.3 K/UL (ref 1–4.8)
LYMPHOCYTES NFR BLD: 16.9 % (ref 18–48)
MCH RBC QN AUTO: 21.7 PG (ref 27–31)
MCHC RBC AUTO-ENTMCNC: 30 G/DL (ref 32–36)
MCV RBC AUTO: 72 FL (ref 82–98)
MONOCYTES # BLD AUTO: 1.2 K/UL (ref 0.3–1)
MONOCYTES NFR BLD: 15.8 % (ref 4–15)
NEUTROPHILS # BLD AUTO: 4.8 K/UL (ref 1.8–7.7)
NEUTROPHILS NFR BLD: 63.7 % (ref 38–73)
NRBC BLD-RTO: 1 /100 WBC
PLATELET # BLD AUTO: 356 K/UL (ref 150–450)
PMV BLD AUTO: 9.3 FL (ref 9.2–12.9)
POTASSIUM SERPL-SCNC: 3.9 MMOL/L (ref 3.5–5.1)
PROT SERPL-MCNC: 7 G/DL (ref 6–8.4)
RBC # BLD AUTO: 3.87 M/UL (ref 4.6–6.2)
SODIUM SERPL-SCNC: 142 MMOL/L (ref 136–145)
WBC # BLD AUTO: 7.46 K/UL (ref 3.9–12.7)

## 2023-09-30 PROCEDURE — 90677 PCV20 VACCINE IM: CPT | Performed by: INTERNAL MEDICINE

## 2023-09-30 PROCEDURE — 25000003 PHARM REV CODE 250

## 2023-09-30 PROCEDURE — 90471 IMMUNIZATION ADMIN: CPT | Performed by: INTERNAL MEDICINE

## 2023-09-30 PROCEDURE — C9113 INJ PANTOPRAZOLE SODIUM, VIA: HCPCS | Performed by: STUDENT IN AN ORGANIZED HEALTH CARE EDUCATION/TRAINING PROGRAM

## 2023-09-30 PROCEDURE — 85025 COMPLETE CBC W/AUTO DIFF WBC: CPT | Performed by: STUDENT IN AN ORGANIZED HEALTH CARE EDUCATION/TRAINING PROGRAM

## 2023-09-30 PROCEDURE — 63600175 PHARM REV CODE 636 W HCPCS: Performed by: INTERNAL MEDICINE

## 2023-09-30 PROCEDURE — 63600175 PHARM REV CODE 636 W HCPCS: Performed by: STUDENT IN AN ORGANIZED HEALTH CARE EDUCATION/TRAINING PROGRAM

## 2023-09-30 PROCEDURE — 25000003 PHARM REV CODE 250: Performed by: STUDENT IN AN ORGANIZED HEALTH CARE EDUCATION/TRAINING PROGRAM

## 2023-09-30 PROCEDURE — 80053 COMPREHEN METABOLIC PANEL: CPT | Performed by: STUDENT IN AN ORGANIZED HEALTH CARE EDUCATION/TRAINING PROGRAM

## 2023-09-30 PROCEDURE — 36415 COLL VENOUS BLD VENIPUNCTURE: CPT | Performed by: STUDENT IN AN ORGANIZED HEALTH CARE EDUCATION/TRAINING PROGRAM

## 2023-09-30 PROCEDURE — 96376 TX/PRO/DX INJ SAME DRUG ADON: CPT

## 2023-09-30 RX ORDER — HYDROCHLOROTHIAZIDE 25 MG/1
25 TABLET ORAL DAILY
Status: DISCONTINUED | OUTPATIENT
Start: 2023-09-30 | End: 2023-09-30 | Stop reason: HOSPADM

## 2023-09-30 RX ORDER — OMEPRAZOLE 20 MG/1
40 TABLET, DELAYED RELEASE ORAL DAILY
Qty: 122 TABLET | Refills: 0 | Status: SHIPPED | OUTPATIENT
Start: 2023-09-30 | End: 2024-02-22

## 2023-09-30 RX ADMIN — HYDROCHLOROTHIAZIDE 25 MG: 25 TABLET ORAL at 11:09

## 2023-09-30 RX ADMIN — OXYBUTYNIN CHLORIDE 10 MG: 5 TABLET, EXTENDED RELEASE ORAL at 09:09

## 2023-09-30 RX ADMIN — ATORVASTATIN CALCIUM 20 MG: 20 TABLET, FILM COATED ORAL at 09:09

## 2023-09-30 RX ADMIN — PANTOPRAZOLE SODIUM 40 MG: 40 INJECTION, POWDER, FOR SOLUTION INTRAVENOUS at 09:09

## 2023-09-30 RX ADMIN — PNEUMOCOCCAL 20-VALENT CONJUGATE VACCINE 0.5 ML
2.2; 2.2; 2.2; 2.2; 2.2; 2.2; 2.2; 2.2; 2.2; 2.2; 2.2; 2.2; 2.2; 2.2; 2.2; 2.2; 4.4; 2.2; 2.2; 2.2 INJECTION, SUSPENSION INTRAMUSCULAR at 02:09

## 2023-09-30 NOTE — NURSING
AVS printed and given to patient. Discharge instructions reviewed by DARNELL Bach RN. IV site removed and telemetry box discontinued without adverse reactions noted. Patient discharged via wheelchair to main entrance and no acute distress noted.

## 2023-09-30 NOTE — PLAN OF CARE
SW spoke with pt via [employer] to discuss dc planning. Pt agreeable to dc with  services, pt agreeable to dc with [facility]. SOC date: 10/3/2023, pt and MD agreeable to date. Patient choice form verbally signed and placed on SSC desk. Pt declined RW at this time stating he already has a RW and rollator at home. Pt daughter will provide pt transportation at time of discharge. LILLIE contacted pt daughter Tiffanie to update on pt discharge.    LILLIE requested gastro f/u appt.    Pt cleared from CM standpoint. Bedside nurse and VN notified.     09/30/23 1253   Final Note   Assessment Type Final Discharge Note   Anticipated Discharge Disposition Home-Health   What phone number can be called within the next 1-3 days to see how you are doing after discharge? [phone]   Post-Acute Status   Post-Acute Authorization Home Health   Home Health Status Referrals Sent   Discharge Delays None known at this time

## 2023-09-30 NOTE — PLAN OF CARE
Introduced as VN and will be reviewing discharge instructions.  Educated patient on reason for admission, home medication list, and discharge instructions including when to return to ED and the following doctor appointments.  Education per flowsheet.  Opportunity given for questions and questions answered. Nurse  notified of   completion of discharge education.  Patient waiting on wheelchair

## 2023-09-30 NOTE — PROGRESS NOTES
[facility] Medicine Progress Note    Primary Team: [facility] Hospitalist Team B  Attending Physician: Ronny Tai MD  Resident: Antony  Intern: Arturo    Date of Admit: 2023     Chief Complaint: Weakness x 3 days    Subjective/Interval Events:      Today the patient was evaluated at bedside. He reports that he feels well, eating and drinking without issue. He denies fever, chills, chest pain, sob.      Objective:   Last 24 Hour Vital Signs:  BP  Min: 124/56  Max: 174/72  Temp  Av.1 °F (36.7 °C)  Min: 96.5 °F (35.8 °C)  Max: 99 °F (37.2 °C)  Pulse  Av.7  Min: 57  Max: 69  Resp  Av.9  Min: 16  Max: 20  SpO2  Av.2 %  Min: 94 %  Max: 98 %    Intake/Output Summary (Last 24 hours) at 2023 0759  Last data filed at 2023 1427  Gross per 24 hour   Intake 200 ml   Output --   Net 200 ml         Physical Examination:    Physical Exam  Constitutional:       Appearance: sitting comfortably on bed, in no acute distress   Eyes:      Extraocular movements intact.      Pupils are equal, round, and reactive to light.   Cardiovascular:      Regular rate and rhythm. No murmurs appreciated.   Pulmonary:      Effort: Pulmonary effort is normal. CTAB.   Abdominal:      No tenderness to palpation, NBS  Musculoskeletal:         General: No tenderness      Right lower leg: No edema present.      Left lower leg: No edema present.   Skin:     General: Skin is warm.      Capillary Refill: Capillary refill takes less than 2 seconds.   Neurological:      Mental Status: He is alert and oriented to person, place, and time.      Motor: Weakness present.      Comments: Hx of Stroke - left sided deficits  Face : slight left hemiplegia, left CN VII paresis  Motor : good tone, 5/5 B/L UE & LE   Sensory : normal B/L UE, decreased LLE    Psychiatric:         Mood and Affect: Mood normal.         Behavior: Behavior normal.      Laboratory:  Recent Labs   Lab 23  0429 23  1655 23  0259 23  0415    WBC 10.92 13.56* 9.01 7.46   HGB 6.4* 8.3* 7.7* 8.4*   HCT 22.3* 28.2* 25.5* 28.0*    352 310 356   MCV 69* 72* 71* 72*   RDW 27.6* 29.6* 29.1* 30.3*     --  141 142   K 4.3  --  3.8 3.9     --  107 108   CO2 24  --  23 22*   BUN 34*  --  21 15   CREATININE 1.3  --  1.1 1.2   GLU 94  --  93 98   PROT 6.8  --  6.7 7.0   ALBUMIN 3.1*  --  3.1* 3.2*   BILITOT 1.1*  --  0.5 0.3   AST 47*  --  39 50*   ALKPHOS 59  --  71 71   ALT 26  --  26 29         Microbiology:  none    Imaging:  none    Current Medications:     Scheduled:   atorvastatin  20 mg Oral Daily    oxybutynin  10 mg Oral Daily    pantoprazole  40 mg Intravenous BID         Infusions:       PRN:  0.9%  NaCl infusion (for blood administration), 0.9%  NaCl infusion (for blood administration), sodium chloride 0.9%    Assessment:     Lon Chin Jr. is a 89 y.o. male with a PMH of Bell's palsy, TIA, stroke, HTN, GERD, HDL presenting with increasing dyspnea and fatigue for the past 3 days. He endorses dark stool and frequent use of pepto bismol. In the ED he was found to have a H/H of 4.2/18.2 prompting repletion of blood and starting treatment for symptomatic anemia.    Plan:     Symptomatic anemia  - H/H = 4.6/17.7 on admission  - FOBT - negative  - History of Bismuth subsalicylate use with positive symptoms (dark stool, fatigue, abdominal tenderness)  - Iron low, TIBC high, Ferritin low - indicative of iron deficiency anemia  - Transfused with 3 units RBC with appropriate Hgb response  - EGD with widely patent Schatzki ring, medium sized hiatal hernia, localized severe mucosal changes with ulceration at pylorus. Follow up biopsies  - Colonoscopy with incomplete visualization 2/2 liquid stool throughout colon, unremarkable   - Continue PPI   - Daily CBC    H/o Stroke  - Past history of Stroke and TIA with left sided deficits (see physical exam)  PLAN :  - Observe for changes in mentation or new neurological deficits  - Holding ASA in  setting of severe anemia on presentation      Hyperlipidemia  - Restart home Atorvastatin 20 mg     Hypertension  - Hypertensive while in the ED  - Holding home meds       Diet: Regular  Code: Full   Dispo: likely discharge home today       Papi Michaud MD  \[facility]\"" Internal Medicine -II       \[facility]\"" Medicine Hospitalist Pager numbers:   \[facility]\"" Hospitalist Medicine Team A (Naheed/Cheyenne): [phone]  \[facility]\"" Hospitalist Medicine Team B (Gillian/Abida):  [phone]

## 2023-09-30 NOTE — PLAN OF CARE
[facility]      HOME HEALTH ORDERS  FACE TO FACE ENCOUNTER    Patient Name: Lon Chin Jr.  YOB: 1933    PCP: Norman Rose MD   PCP Address: [address] [place] / [place] [address]  PCP Phone Number: [phone]  PCP Fax: [phone]    Encounter Date: 9/27/23    Admit to Home Health    Diagnoses:  Active Hospital Problems    Diagnosis  POA    *Symptomatic anemia [D64.9]  Unknown    Iron deficiency anemia [D50.9]  Unknown    CVA (cerebral vascular accident) [I63.9]  Yes    Hyperlipidemia [E78.5]  Yes    Hypertension [I10]  Yes      Resolved Hospital Problems   No resolved problems to display.       Follow Up Appointments:  No future appointments.    Allergies:  Review of patient's allergies indicates:   Allergen Reactions    Gabapentin (bulk) Other (See Comments)     Severe constipation    Prednisone        Medications: Review discharge medications with patient and family and provide education.    Current Facility-Administered Medications   Medication Dose Route Frequency Provider Last Rate Last Admin    0.9%  NaCl infusion (for blood administration)   Intravenous Q24H PRN Jono Hardy MD        0.9%  NaCl infusion (for blood administration)   Intravenous Q24H PRN Jono Hardy MD        atorvastatin tablet 20 mg  20 mg Oral Daily Jono Hardy MD   20 mg at 09/30/23 0950    hydroCHLOROthiazide tablet 25 mg  25 mg Oral Daily Papi Michaud MD   25 mg at 09/30/23 1136    oxybutynin 24 hr tablet 10 mg  10 mg Oral Daily Jono Hardy MD   10 mg at 09/30/23 0926    pantoprazole injection 40 mg  40 mg Intravenous BID Jono Hardy MD   40 mg at 09/30/23 0932    sodium chloride 0.9% flush 10 mL  10 mL Intravenous PRN Jono Hardy MD         Current Discharge Medication List        CONTINUE these medications which have NOT CHANGED    Details   atorvastatin (LIPITOR) 20 MG tablet Take 1 tablet (20 mg total) by mouth once daily.  Qty: 30 tablet, Refills: 1      bumetanide  (BUMEX) 1 MG tablet Take 1 mg by mouth once daily.      clopidogrel (PLAVIX) 75 mg tablet Take 75 mg by mouth once daily.      docusate sodium (COLACE) 100 MG capsule Take 1 capsule (100 mg total) by mouth 2 (two) times daily as needed (while taking pain medication).  Qty: 20 capsule, Refills: 0      hydroCHLOROthiazide (HYDRODIURIL) 25 MG tablet Take 25 mg by mouth once daily.      HYDROcodone-acetaminophen (NORCO) 5-325 mg per tablet Take 1 tablet by mouth every 6 (six) hours as needed for Pain.  Qty: 11 tablet, Refills: 0    Comments: Quantity prescribed more than 7 day supply? No      irbesartan (AVAPRO) 300 MG tablet Take 300 mg by mouth once daily.      multivitamin-minerals-lutein (MULTIVITAMIN 50 PLUS) Tab Take 1 tablet by mouth once daily.      nebivolol (BYSTOLIC) 10 MG Tab Take 1 tablet (10 mg total) by mouth once daily.  Qty: 30 tablet, Refills: 11      omeprazole (PRILOSEC OTC) 20 MG tablet Take 20 mg by mouth once daily.      oxybutynin (DITROPAN-XL) 10 MG 24 hr tablet Take 10 mg by mouth once daily.      potassium chloride SA (KLOR-CON M20) 20 MEQ tablet Take 1 tablet (20 mEq total) by mouth once daily.  Qty: 30 tablet, Refills: 11      saw palmetto 500 MG capsule Take 500 mg by mouth once daily.               I have seen and examined this patient within the last 30 days. My clinical findings that support the need for the home health skilled services and home bound status are the following:  Weakness/numbness causing balance and gait disturbance due to Weakness/Debility making it taxing to leave home.     Diet:   cardiac diet    Labs:  Report Lab results to PCP.    Referrals/ Consults  Physical Therapy to evaluate and treat. Evaluate for home safety and equipment needs; Establish/upgrade home exercise program. Perform / instruct on therapeutic exercises, gait training, transfer training, and Range of Motion.    Activities:   activity as tolerated    Nursing:   Agency to admit patient within 24 hours  of hospital discharge unless specified on physician order or at patient request    SN to complete comprehensive assessment including routine vital signs. Instruct on disease process and s/s of complications to report to MD. Review/verify medication list sent home with the patient at time of discharge  and instruct patient/caregiver as needed. Frequency may be adjusted depending on start of care date.     Skilled nurse to perform up to 3 visits PRN for symptoms related to diagnosis    Notify MD if SBP > 160 or < 90; DBP > 90 or < 50; HR > 120 or < 50; Temp > 101; O2 < 88%;    Ok to schedule additional visits based on staff availability and patient request on consecutive days within the home health episode.    When multiple disciplines ordered:    Start of Care occurs on Sunday - Wednesday schedule remaining discipline evaluations as ordered on separate consecutive days following the start of care.    Thursday SOC -schedule subsequent evaluations Friday and Monday the following week.     Friday - Saturday SOC - schedule subsequent discipline evaluations on consecutive days starting Monday of the following week.    For all post-discharge communication and subsequent orders please contact patient's primary care physician. If unable to reach primary care physician or do not receive response within 30 minutes, please contact [phone] for clinical staff order clarification    Miscellaneous   None    Home Health Aide:  Physical Therapy Three times weekly    Wound Care Orders  no    I certify that this patient is confined to his home and needs physical therapy.      Papi Michaud MD  hospitals Internal Medicine -II

## 2023-09-30 NOTE — DISCHARGE SUMMARY
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     Brief History of Present Illness      Lon Chin Jr. is a 89 y.o. male who  has a past medical history of Arthritis, Bell's palsy (feb or march 2013), Blood clot in vein, Cataract associated with other syndromes, GERD (gastroesophageal reflux disease), Hypertension, Other and unspecified hyperlipidemia, Stroke, and TIA (transient ischemic attack). The patient presented to [facility] on 9/27/2023 with a primary complaint of Weakness (H&H 4.2 and  18.2. C/O weakness for several days. Some back pain note that is chronic.)     The patient was in their usual state of health until a few weeks ago when he started feeling fatigued and dyspneic which worsened in the past 3 days. He also endorses abdominal discomfort, distension and dark black stools; he believes it could be from the regular use of Pepto Bismo. He experiences periods of constipation mixed with episodes of diarrhea. Denies blood in urine and NSAID use.     For the full HPI please refer to the History & Physical from this admission.    Hospital Course By Problem with Pertinent Findings     Symptomatic anemia - resolved  - H/H = 4.6/17.7 on admission  - FOBT - negative  - History of Bismuth subsalicylate use with positive symptoms (dark stool, fatigue, abdominal tenderness)  - Iron low, TIBC high, Ferritin low - indicative of iron deficiency anemia  - Transfused with 3 units RBC with appropriate Hgb response  - EGD  with widely patent Schatzki ring, medium sized hiatal hernia, localized severe mucosal changes with ulceration at pylorus. Follow up biopsies  - Colonoscopy with incomplete visualization 2/2 liquid stool throughout colon, unremarkable   - Daily CBC, hgb stable, no further signs of active bleeding  - Continue PPI, follow up with GI outpatient      H/o Stroke  - Past history of Stroke and TIA with left sided deficits (see physical exam)  - Observed for changes in mentation or new neurological deficits  - Held ASA in setting of severe anemia on presentation     Hyperlipidemia  - Continue home Atorvastatin 20 mg       Discharge Medications        Medication List        CHANGE how you take these medications      nebivoloL 10 MG Tab  Commonly known as: BYSTOLIC  Take 1 tablet (10 mg total) by mouth once daily.  What changed: when to take this     omeprazole 20 MG tablet  Commonly known as: PRILOSEC OTC  Take 2 tablets (40 mg total) by mouth once daily.  What changed: how much to take            CONTINUE taking these medications      atorvastatin 20 MG tablet  Commonly known as: LIPITOR  Take 1 tablet (20 mg total) by mouth once daily.     bumetanide 1 MG tablet  Commonly known as: BUMEX     clopidogreL 75 mg tablet  Commonly known as: PLAVIX     docusate sodium 100 MG capsule  Commonly known as: COLACE  Take 1 capsule (100 mg total) by mouth 2 (two) times daily as needed (while taking pain medication).     hydroCHLOROthiazide 25 MG tablet  Commonly known as: HYDRODIURIL     HYDROcodone-acetaminophen 5-325 mg per tablet  Commonly known as: NORCO  Take 1 tablet by mouth every 6 (six) hours as needed for Pain.     irbesartan 300 MG tablet  Commonly known as: AVAPRO     MULTIVITAMIN 50 PLUS Tab  Generic drug: multivitamin-minerals-lutein     oxybutynin 10 MG 24 hr tablet  Commonly known as: DITROPAN-XL     potassium chloride SA 20 MEQ tablet  Commonly known as: KLOR-CON M20  Take 1 tablet (20 mEq total) by mouth once daily.      saw palmetto 500 MG capsule               Where to Get Your Medications        These medications were sent to [id number] - [place], [place] - [address] [facility]  [address] [facility] [address]      Phone: [phone]   omeprazole 20 MG tablet         Discharge Information:     Diet:  Cardiac    Physical Activity:  As tolerated             Instructions:  1. Take all medications as prescribed  2. Keep all follow-up appointments  3. Return to the hospital or call your primary care physicians if any worsening symptoms such as fever, chest pain, shortness of breath, return of symptoms, or any other concerns.    Follow-Up Appointments:  Follow up with GI and PCP outpatient     Papi Michaud MD  [facility] Internal Medicine -II  [facility] Internal Medicine Team B

## 2023-10-02 NOTE — PHYSICIAN QUERY
PT Name: Lon Chin Jr.  MR #: [id number]    DOCUMENTATION CLARIFICATION      CDS: Mallorie ESTES,RN        Contact information:[phone]      This form is a permanent document in the medical record.      Query Date: October 2, 2023    By submitting this query, we are merely seeking further clarification of documentation. Please utilize your independent clinical judgment when addressing the question(s) below.    The Medical Record contains the following:   Indicators  Supporting Clinical Findings Location in Medical Record   x Anemia documented 89-year-old male who complains of generalized weakness and fatigue over the past few days.  He is also had dyspnea on exertion.  Patient had blood work done 2 days ago and was noted to be severely anemic.   Anemia, unspecified type (Primary)  No history of anemia.      9/27  ED Provider  Notes   x H&H Hgb:4.6---->6.7---->6.4---->8.3----->7.7--->8.4  Hct: 17.7-->22.6-->22.3---->28.2-->25.5-->28.0  9/27---->9/30 Labs   x BP                    HR Last 24 Hour Vital Signs:   SBP: 115---->143  DBP: 57----->63  HR: 75---->78           9/27  Hospitalist  H&P    Bleeding     x Procedure/Surgery Performed/EBL Findings:   The perianal and digital rectal examinations were normal. Liquid stool was found in the entire colon, interfering with visualization. Lavage of the area was performed, resulting in incomplete clearance with fair visualization.     Findings:    A widely patent Schatzki ring was found in the lower third of the esophagus. A medium-sized hiatal hernia was present.  Localized severe mucosal changes characterized by ulceration were  found at the pylorus. Biopsies were taken with a cold forceps for    histology. Verification of patient identification for the specimen was done. Estimated blood loss was minimal.    The examined duodenum was normal.         9/29 Colonoscopy                9/29 Upper GI Endoscopy    x Transfusion(s)  Transfuse  RBC  1 Unit  completed 9/28/23 1328 Volume: 303.75 ml  start 9/28/23 0900 end 9/28/23 1303  Transfuse RBC 2 Units Transfusing  Start  9/27/23 2203                                        Transfusing Start 9/27/23 1638 9/27------>9/28 Blood Transfusion Record   x Acute/Chronic illness Symptomatic anemia, cerebral  vascular accident past h/o stroke and TIA with left,hyperlipidemia, hypertension, bell's palsy, GERD      9/27   Hospitalist H&P      Treatments     x Other Increasing dyspnea  and fatigue for the past 3 days    Iron:<10  TIBC: 462  Sat Iron: Unable to calculate  Transferrin: 312  Ferritin: 5  9/27   Hospitalist H&P       9/27  Iron/ Anemia Profile      Provider, please further specify anemia  diagnosis or diagnoses associated with above clinical findings.  Durga All That Apply:  [   ] Acute blood loss anemia    [   ] Iron deficiency anemia    [  x ] Chronic blood loss anemia     [   ] Anemia, unspecified    [   ] Other Hematological Diagnosis (please specify): _________________          Please document in your progress notes daily for the duration of treatment, until resolved, and include in your discharge summary.    Form No. [id number]

## 2023-10-03 ENCOUNTER — PATIENT OUTREACH (OUTPATIENT)
Dept: ADMINISTRATIVE | Facility: CLINIC | Age: 88
End: 2023-10-03
Payer: COMMERCIAL

## 2023-10-03 ENCOUNTER — PATIENT MESSAGE (OUTPATIENT)
Dept: ADMINISTRATIVE | Facility: CLINIC | Age: 88
End: 2023-10-03
Payer: COMMERCIAL

## 2023-10-03 NOTE — PROGRESS NOTES
C3 nurse attempted to contact Lon Chin Jr. for a TCC post hospital discharge follow up call. No answer, left voicemail with callback information. The patient does not have a scheduled HOSFU appointment with his PCP,  Norman Rose MD within 5-7 days of his discharge date of 9/30/23. No messages routed at this time.

## 2023-10-04 NOTE — PROGRESS NOTES
C3 nurse attempted to contact Lon Chin Jr. for a TCC post hospital discharge follow up call. No answer, left voicemail with callback information. The patient does not have a scheduled HOSFU appointment with his PCP,  Norman Rose MD within 5-7 days of his discharge date of 9/30/23. Unable to route to Norman Rose MD.

## 2023-10-18 ENCOUNTER — PATIENT MESSAGE (OUTPATIENT)
Dept: ENDOSCOPY | Facility: HOSPITAL | Age: 88
End: 2023-10-18
Payer: COMMERCIAL

## 2023-10-18 ENCOUNTER — TELEPHONE (OUTPATIENT)
Dept: GASTROENTEROLOGY | Facility: CLINIC | Age: 88
End: 2023-10-18
Payer: COMMERCIAL

## 2023-10-18 ENCOUNTER — TELEPHONE (OUTPATIENT)
Dept: ENDOSCOPY | Facility: HOSPITAL | Age: 88
End: 2023-10-18
Payer: COMMERCIAL

## 2023-10-18 DIAGNOSIS — K31.89 GASTRIC MASS: Primary | ICD-10-CM

## 2023-10-18 NOTE — TELEPHONE ENCOUNTER
Spoke to pt's daughter, Tiffanie, to schedule procedure(s) Upper Endoscopy (EGD)/EUS       Physician to perform procedure(s) Dr. MAGY Rangel  Date of Procedure (s) 10/20/23  Arrival Time 1:15 PM  Time of Procedure(s) 2:15 PM   Location of Procedure(s) [address]  Type of Rx Prep sent to patient: N/A  Instructions provided to patient via [facility]    Patient was informed on the following information and verbalized understanding. Screening questionnaire reviewed with patient and complete. If procedure requires anesthesia, a responsible adult needs to be present to accompany the patient home, patient cannot drive after receiving anesthesia. Appointment details are tentative, especially check-in time. Patient will receive a prep-op call 4 days prior to confirm check-in time for procedure. If applicable the patient should contact their pharmacy to verify Rx for procedure prep is ready for pick-up. Patient was advised to call the scheduling department at [phone] if pharmacy states no Rx is available. Patient was advised to call the endoscopy scheduling department if any questions or concerns arise.       Endoscopy Scheduling Department

## 2023-10-18 NOTE — TELEPHONE ENCOUNTER
The patient need an urgent EGD/EUS (radial) for gastric ulcerated mass (HGD). 45 minutes. [address] or [place]. Referring: Jan Lozano MD  Thanks,  Reymundo Rangel MD

## 2023-10-20 ENCOUNTER — ANESTHESIA (OUTPATIENT)
Dept: ENDOSCOPY | Facility: HOSPITAL | Age: 88
End: 2023-10-20
Payer: COMMERCIAL

## 2023-10-20 ENCOUNTER — HOSPITAL ENCOUNTER (OUTPATIENT)
Facility: HOSPITAL | Age: 88
Discharge: HOME | End: 2023-10-20
Attending: INTERNAL MEDICINE | Admitting: INTERNAL MEDICINE
Payer: COMMERCIAL

## 2023-10-20 ENCOUNTER — ANESTHESIA EVENT (OUTPATIENT)
Dept: ENDOSCOPY | Facility: HOSPITAL | Age: 88
End: 2023-10-20
Payer: COMMERCIAL

## 2023-10-20 VITALS
HEART RATE: 64 BPM | DIASTOLIC BLOOD PRESSURE: 65 MMHG | SYSTOLIC BLOOD PRESSURE: 116 MMHG | BODY MASS INDEX: 32.28 KG/M2 | OXYGEN SATURATION: 96 % | TEMPERATURE: 99 F | WEIGHT: 213 LBS | HEIGHT: 68 IN | RESPIRATION RATE: 18 BRPM

## 2023-10-20 DIAGNOSIS — K31.89 GASTRIC MASS: Primary | ICD-10-CM

## 2023-10-20 PROCEDURE — 43259 EGD US EXAM DUODENUM/JEJUNUM: CPT | Performed by: INTERNAL MEDICINE

## 2023-10-20 PROCEDURE — D9220A PRA ANESTHESIA: Mod: ANES,,, | Performed by: ANESTHESIOLOGY

## 2023-10-20 PROCEDURE — 88342 IMHCHEM/IMCYTCHM 1ST ANTB: CPT | Mod: 26,59,, | Performed by: STUDENT IN AN ORGANIZED HEALTH CARE EDUCATION/TRAINING PROGRAM

## 2023-10-20 PROCEDURE — 88342 CHG IMMUNOCYTOCHEMISTRY: ICD-10-PCS | Mod: 26,59,, | Performed by: STUDENT IN AN ORGANIZED HEALTH CARE EDUCATION/TRAINING PROGRAM

## 2023-10-20 PROCEDURE — 88341 PR IHC OR ICC EACH ADD'L SINGLE ANTIBODY  STAINPR: ICD-10-PCS | Mod: 26,59,, | Performed by: STUDENT IN AN ORGANIZED HEALTH CARE EDUCATION/TRAINING PROGRAM

## 2023-10-20 PROCEDURE — 88341 IMHCHEM/IMCYTCHM EA ADD ANTB: CPT | Mod: 26,59,, | Performed by: STUDENT IN AN ORGANIZED HEALTH CARE EDUCATION/TRAINING PROGRAM

## 2023-10-20 PROCEDURE — 88360 TUMOR IMMUNOHISTOCHEM/MANUAL: CPT | Performed by: STUDENT IN AN ORGANIZED HEALTH CARE EDUCATION/TRAINING PROGRAM

## 2023-10-20 PROCEDURE — 25000003 PHARM REV CODE 250: Performed by: STUDENT IN AN ORGANIZED HEALTH CARE EDUCATION/TRAINING PROGRAM

## 2023-10-20 PROCEDURE — 88305 TISSUE EXAM BY PATHOLOGIST: CPT | Mod: 26,,, | Performed by: STUDENT IN AN ORGANIZED HEALTH CARE EDUCATION/TRAINING PROGRAM

## 2023-10-20 PROCEDURE — 43259 PR ENDOSCOPIC ULTRASOUND EXAM: ICD-10-PCS | Mod: ,,, | Performed by: INTERNAL MEDICINE

## 2023-10-20 PROCEDURE — 43239 PR EGD, FLEX, W/BIOPSY, SGL/MULTI: ICD-10-PCS | Mod: 51,,, | Performed by: INTERNAL MEDICINE

## 2023-10-20 PROCEDURE — 88360 TUMOR IMMUNOHISTOCHEM/MANUAL: CPT | Mod: 26,,, | Performed by: STUDENT IN AN ORGANIZED HEALTH CARE EDUCATION/TRAINING PROGRAM

## 2023-10-20 PROCEDURE — 37000008 HC ANESTHESIA 1ST 15 MINUTES: Performed by: INTERNAL MEDICINE

## 2023-10-20 PROCEDURE — 88305 TISSUE EXAM BY PATHOLOGIST: CPT | Performed by: STUDENT IN AN ORGANIZED HEALTH CARE EDUCATION/TRAINING PROGRAM

## 2023-10-20 PROCEDURE — 43239 EGD BIOPSY SINGLE/MULTIPLE: CPT | Mod: 51,,, | Performed by: INTERNAL MEDICINE

## 2023-10-20 PROCEDURE — 88360 PR  TUMOR IMMUNOHISTOCHEM/MANUAL: ICD-10-PCS | Mod: 26,,, | Performed by: STUDENT IN AN ORGANIZED HEALTH CARE EDUCATION/TRAINING PROGRAM

## 2023-10-20 PROCEDURE — 43259 EGD US EXAM DUODENUM/JEJUNUM: CPT | Mod: ,,, | Performed by: INTERNAL MEDICINE

## 2023-10-20 PROCEDURE — D9220A PRA ANESTHESIA: ICD-10-PCS | Mod: CRNA,,, | Performed by: STUDENT IN AN ORGANIZED HEALTH CARE EDUCATION/TRAINING PROGRAM

## 2023-10-20 PROCEDURE — 37000009 HC ANESTHESIA EA ADD 15 MINS: Performed by: INTERNAL MEDICINE

## 2023-10-20 PROCEDURE — D9220A PRA ANESTHESIA: Mod: CRNA,,, | Performed by: STUDENT IN AN ORGANIZED HEALTH CARE EDUCATION/TRAINING PROGRAM

## 2023-10-20 PROCEDURE — 63600175 PHARM REV CODE 636 W HCPCS: Performed by: STUDENT IN AN ORGANIZED HEALTH CARE EDUCATION/TRAINING PROGRAM

## 2023-10-20 PROCEDURE — 88341 IMHCHEM/IMCYTCHM EA ADD ANTB: CPT | Performed by: STUDENT IN AN ORGANIZED HEALTH CARE EDUCATION/TRAINING PROGRAM

## 2023-10-20 PROCEDURE — 27201012 HC FORCEPS, HOT/COLD, DISP: Performed by: INTERNAL MEDICINE

## 2023-10-20 PROCEDURE — 88305 TISSUE EXAM BY PATHOLOGIST: ICD-10-PCS | Mod: 26,,, | Performed by: STUDENT IN AN ORGANIZED HEALTH CARE EDUCATION/TRAINING PROGRAM

## 2023-10-20 PROCEDURE — D9220A PRA ANESTHESIA: ICD-10-PCS | Mod: ANES,,, | Performed by: ANESTHESIOLOGY

## 2023-10-20 PROCEDURE — 43239 EGD BIOPSY SINGLE/MULTIPLE: CPT | Performed by: INTERNAL MEDICINE

## 2023-10-20 PROCEDURE — 88342 IMHCHEM/IMCYTCHM 1ST ANTB: CPT | Performed by: STUDENT IN AN ORGANIZED HEALTH CARE EDUCATION/TRAINING PROGRAM

## 2023-10-20 RX ORDER — SODIUM CHLORIDE 9 MG/ML
INJECTION, SOLUTION INTRAVENOUS CONTINUOUS
Status: DISCONTINUED | OUTPATIENT
Start: 2023-10-20 | End: 2023-10-20 | Stop reason: HOSPADM

## 2023-10-20 RX ORDER — SODIUM CHLORIDE 0.9 % (FLUSH) 0.9 %
10 SYRINGE (ML) INJECTION
Status: DISCONTINUED | OUTPATIENT
Start: 2023-10-20 | End: 2023-10-20 | Stop reason: HOSPADM

## 2023-10-20 RX ORDER — ZINC GLUCONATE 50 MG
50 TABLET ORAL DAILY
COMMUNITY

## 2023-10-20 RX ORDER — LIDOCAINE HYDROCHLORIDE 20 MG/ML
INJECTION INTRAVENOUS
Status: DISCONTINUED | OUTPATIENT
Start: 2023-10-20 | End: 2023-10-20

## 2023-10-20 RX ORDER — PROPOFOL 10 MG/ML
VIAL (ML) INTRAVENOUS
Status: DISCONTINUED | OUTPATIENT
Start: 2023-10-20 | End: 2023-10-20

## 2023-10-20 RX ADMIN — PROPOFOL 50 MG: 10 INJECTION, EMULSION INTRAVENOUS at 02:10

## 2023-10-20 RX ADMIN — PROPOFOL 20 MG: 10 INJECTION, EMULSION INTRAVENOUS at 03:10

## 2023-10-20 RX ADMIN — PROPOFOL 20 MG: 10 INJECTION, EMULSION INTRAVENOUS at 02:10

## 2023-10-20 RX ADMIN — PROPOFOL 10 MG: 10 INJECTION, EMULSION INTRAVENOUS at 02:10

## 2023-10-20 RX ADMIN — LIDOCAINE HYDROCHLORIDE 100 MG: 20 INJECTION, SOLUTION INTRAVENOUS at 02:10

## 2023-10-20 RX ADMIN — PROPOFOL 30 MG: 10 INJECTION, EMULSION INTRAVENOUS at 02:10

## 2023-10-20 RX ADMIN — SODIUM CHLORIDE: 0.9 INJECTION, SOLUTION INTRAVENOUS at 02:10

## 2023-10-20 NOTE — H&P
History & Physical - Short Stay  Gastroenterology      SUBJECTIVE:     Procedure: EUS    Chief Complaint/Indication for Procedure: Gastric lesion    History of Present Illness:  Patient is a 89 y.o. male presents with ulcerated gastric lesion with HGD on biopsies.     PTA Medications   Medication Sig    atorvastatin (LIPITOR) 20 MG tablet Take 1 tablet (20 mg total) by mouth once daily.    bumetanide (BUMEX) 1 MG tablet Take 1 mg by mouth once daily.    docusate sodium (COLACE) 100 MG capsule Take 1 capsule (100 mg total) by mouth 2 (two) times daily as needed (while taking pain medication).    hydroCHLOROthiazide (HYDRODIURIL) 25 MG tablet Take 25 mg by mouth once daily.    HYDROcodone-acetaminophen (NORCO) 5-325 mg per tablet Take 1 tablet by mouth every 6 (six) hours as needed for Pain.    irbesartan (AVAPRO) 300 MG tablet Take 300 mg by mouth once daily.    multivitamin-minerals-lutein (MULTIVITAMIN 50 PLUS) Tab Take 1 tablet by mouth once daily.    nebivolol (BYSTOLIC) 10 MG Tab Take 1 tablet (10 mg total) by mouth once daily.    omeprazole (PRILOSEC OTC) 20 MG tablet Take 2 tablets (40 mg total) by mouth once daily.    oxybutynin (DITROPAN-XL) 10 MG 24 hr tablet Take 10 mg by mouth once daily.    potassium chloride SA (KLOR-CON M20) 20 MEQ tablet Take 1 tablet (20 mEq total) by mouth once daily.    saw palmetto 500 MG capsule Take 500 mg by mouth once daily.    zinc gluconate 50 mg tablet Take 50 mg by mouth once daily.    clopidogrel (PLAVIX) 75 mg tablet Take 75 mg by mouth once daily.       Review of patient's allergies indicates:   Allergen Reactions    Gabapentin (bulk) Other (See Comments)     Severe constipation    Prednisone         Past Medical History:   Diagnosis Date    Arthritis     In back, neck    Bell's palsy feb or march 2013    Blood clot in vein     right leg after back surgery    Cataract associated with other syndromes     GERD (gastroesophageal reflux disease)     Hypertension     Other  "and unspecified hyperlipidemia     Stroke     TIA (transient ischemic attack)     Feb or 2013     Past Surgical History:   Procedure Laterality Date    APPENDECTOMY      BACK SURGERY      COLONOSCOPY N/A 2023    Procedure: COLONOSCOPY;  Surgeon: Jan Lozano MD;  Location: [facility];  Service: Gastroenterology;  Laterality: N/A;    ESOPHAGOGASTRODUODENOSCOPY N/A 2023    Procedure: EGD (ESOPHAGOGASTRODUODENOSCOPY);  Surgeon: Jan Lozano MD;  Location: [facility];  Service: Gastroenterology;  Laterality: N/A;    PROSTATE SURGERY       Family History   Problem Relation Age of Onset    Stroke Father     Hypertension Son      Social History     Tobacco Use    Smoking status: Former     Current packs/day: 0.00     Types: Cigarettes     Quit date: 1955     Years since quittin.8   Substance Use Topics    Alcohol use: No       Review of Systems:  Constitutional: no fever or chills  Respiratory: no cough or shortness of breath  Cardiovascular: no chest pain or palpitations    OBJECTIVE:     Vital Signs (Most Recent)  Temp: 98.8 °F (37.1 °C) (10/20/23 1324)  Pulse: 64 (10/20/23 1324)  Resp: 16 (10/20/23 1324)  BP: 139/62 (10/20/23 1324)  SpO2: 99 % (10/20/23 1324)    Physical Exam:  General: well developed, well nourished  Lungs:  normal respiratory effort  Heart: regular rate, S1, S2 normal    Laboratory  CBC: No results for input(s): "WBC", "RBC", "HGB", "HCT", "PLT", "MCV", "MCH", "MCHC" in the last 168 hours.  CMP: No results for input(s): "GLU", "CALCIUM", "ALBUMIN", "PROT", "NA", "K", "CO2", "CL", "BUN", "CREATININE", "ALKPHOS", "ALT", "AST", "BILITOT" in the last 168 hours.  Coagulation: No results for input(s): "LABPROT", "INR", "APTT" in the last 168 hours.    Diagnostic Results:      ASSESSMENT/PLAN:     Gastric lesion    Plan: EUS    Anesthesia Plan: MAC    ASA Grade: ASA 3 - Patient with moderate systemic disease with functional limitations    The impression and " plan was discussed in detail with the patient and family. All questions have been answered and the patient voices understanding of our plan at this point. The risk of the procedure was discussed in detail which includes but not limited to bleeding, infection, perforation in some cases requiring surgery with its spectrum of complications.

## 2023-10-20 NOTE — TRANSFER OF CARE
"Anesthesia Transfer of Care Note    Patient: Lon Chin Jr.    Procedure(s) Performed: Procedure(s) (LRB):  EGD (ESOPHAGOGASTRODUODENOSCOPY) (N/A)  ULTRASOUND, UPPER GI TRACT, ENDOSCOPIC (N/A)    Patient location: GI    Anesthesia Type: general    Transport from OR: Transported from OR on room air with adequate spontaneous ventilation    Post pain: adequate analgesia    Post assessment: no apparent anesthetic complications and tolerated procedure well    Post vital signs: stable    Level of consciousness: awake and responds to stimulation    Nausea/Vomiting: no nausea/vomiting    Complications: none    Transfer of care protocol was followed      Last vitals: Visit Vitals  /62 (BP Location: Left arm, Patient Position: Lying)   Pulse 64   Temp 37.1 °C (98.8 °F) (Skin)   Resp 16   Ht 5' 8" (1.727 m)   Wt 96.6 kg (213 lb)   SpO2 99%   BMI 32.39 kg/m²     "

## 2023-10-20 NOTE — [employer]ATION PATIENT INSTRUCTIONS
Discharge Summary/Instructions after an Endoscopic Procedure  Patient Name: Lon Chin  Patient MRN: [id number]  Patient YOB: 1933  Friday, October 20, 2023  Reymundo Rangel MD  Dear patient,  As a result of recent federal legislation (The Federal Cures Act), you may   receive lab or pathology results from your procedure in your [facility]   account before your physician is able to contact you. Your physician or   their representative will relay the results to you with their   recommendations at their soonest availability.  Thank you,  Your health is very important to us during the Covid Crisis. Following your   procedure today, you will receive a daily text for 2 weeks asking about   signs or symptoms of Covid 19.  Please respond to this text when you   receive it so we can follow up and keep you as safe as possible.   RESTRICTIONS:  During your procedure today, you received medications for sedation.  These   medications may affect your judgment, balance and coordination.  Therefore,   for 24 hours, you have the following restrictions:   - DO NOT drive a car, operate machinery, make legal/financial decisions,   sign important papers or drink alcohol.    ACTIVITY:  Today: no heavy lifting, straining or running due to procedural   sedation/anesthesia.  The following day: return to full activity including work.  DIET:  Eat and drink normally unless instructed otherwise.     TREATMENT FOR COMMON SIDE EFFECTS:  - Mild abdominal pain, nausea, belching, bloating or excessive gas:  rest,   eat lightly and use a heating pad.  - Sore Throat: treat with throat lozenges and/or gargle with warm salt   water.  - Because air was used during the procedure, expelling large amounts of air   from your rectum or belching is normal.  - If a bowel prep was taken, you may not have a bowel movement for 1-3 days.    This is normal.  SYMPTOMS TO WATCH FOR AND REPORT TO YOUR PHYSICIAN:  1. Abdominal pain or bloating, other than  gas cramps.  2. Chest pain.  3. Back pain.  4. Signs of infection such as: chills or fever occurring within 24 hours   after the procedure.  5. Rectal bleeding, which would show as bright red, maroon, or black stools.   (A tablespoon of blood from the rectum is not serious, especially if   hemorrhoids are present.)  6. Vomiting.  7. Weakness or dizziness.  GO DIRECTLY TO THE NEAREST EMERGENCY ROOM IF YOU HAVE ANY OF THE FOLLOWING:      Difficulty breathing              Chills and/or fever over 101 F   Persistent vomiting and/or vomiting blood   Severe abdominal pain   Severe chest pain   Black, tarry stools   Bleeding- more than one tablespoon   Any other symptom or condition that you feel may need urgent attention  Your doctor recommends these additional instructions:  If any biopsies were taken, your doctors clinic will contact you in 1 to 2   weeks with any results.  - Discharge patient to home (ambulatory).   - Await path results.   - Perform CT scan (computed tomography) of the abdomen with contrast at   appointment to be scheduled.   - Refer to an oncologist at appointment to be scheduled.   - Refer to Surgical Oncology at appointment to be scheduled.  For questions, problems or results please call your physician - Reymundo Rangel MD.  EMERGENCY PHONE NUMBER: [phone],  LAB RESULTS: [phone]  IF A COMPLICATION OR EMERGENCY SITUATION ARISES AND YOU ARE UNABLE TO REACH   YOUR PHYSICIAN - GO DIRECTLY TO THE EMERGENCY ROOM.  Reymundo Rangel MD  10/20/2023 3:30:06 PM  This report has been verified and signed electronically.  Dear patient,  As a result of recent federal legislation (The Federal Cures Act), you may   receive lab or pathology results from your procedure in your [facility]   account before your physician is able to contact you. Your physician or   their representative will relay the results to you with their   recommendations at their soonest availability.  Thank you,  [employer]OVATION

## 2023-10-20 NOTE — ANESTHESIA PREPROCEDURE EVALUATION
10/20/2023  Lon Chin Jr. is a 89 y.o., male.      Pre-op Assessment    I have reviewed the Patient Summary Reports.     I have reviewed the Nursing Notes.    I have reviewed the Medications.     Review of Systems  Anesthesia Hx:             Denies Family Hx of Anesthesia complications.    Denies Personal Hx of Anesthesia complications.                       Patient Active Problem List   Diagnosis    Hypertension    Hypokalemia    Osteoarthritis of multiple joints    Facial weakness due to cerebrovascular disease(438.83)    Diarrhea    Osteoarthritis of lumbar spine    Dysarthria    Dysphagia, oropharyngeal    Cerebral thrombosis without mention of cerebral infarction    Hyperlipidemia    Cerebral microvascular disease    Cerebral arteriosclerosis    DJD (degenerative joint disease) of cervical spine    Hemiparesis affecting left side as late effect of stroke    Bursitis, trochanteric    Bursitis of left hip    CVA (cerebral vascular accident)    Piriformis syndrome of right side    Muscle spasms of neck    Symptomatic anemia    Iron deficiency anemia       Past Medical History:   Diagnosis Date    Arthritis     In back, neck    Bell's palsy feb or march 2013    Blood clot in vein     right leg after back surgery    Cataract associated with other syndromes     GERD (gastroesophageal reflux disease)     Hypertension     Other and unspecified hyperlipidemia     Stroke     TIA (transient ischemic attack)     Feb or march 2013       ECHO: Results for orders placed during the hospital encounter of 03/29/22    Echo Saline Bubble? No    Interpretation Summary  · The left ventricle is normal in size with concentric remodeling and normal systolic function.  · The estimated ejection fraction is 60%.  · Normal left ventricular diastolic function.  · The estimated PA systolic pressure is 32 mmHg.  · Normal right  ventricular size with normal right ventricular systolic function.  · Normal central venous pressure (3 mmHg).      There is no height or weight on file to calculate BMI.    Tobacco Use: Medium Risk (10/20/2023)    Patient History     Smoking Tobacco Use: Former     Smokeless Tobacco Use: Unknown     Passive Exposure: Not on file       Social History     Substance and Sexual Activity   Drug Use Not on file        Alcohol Use: Not on file       Review of patient's allergies indicates:   Allergen Reactions    Gabapentin (bulk) Other (See Comments)     Severe constipation    Prednisone          Airway:  No value filed.     Physical Exam    Airway:  Mouth Opening: Normal  TM Distance: Normal        Anesthesia Plan  Type of Anesthesia, risks & benefits discussed:    Anesthesia Type: Gen Natural Airway  Intra-op Monitoring Plan: Standard ASA Monitors  Post Op Pain Control Plan: multimodal analgesia  Induction:  IV  Informed Consent: Informed consent signed with the Patient and all parties understand the risks and agree with anesthesia plan.  All questions answered.   ASA Score: 3  Day of Surgery Review of History & Physical: H&P Update referred to the surgeon/provider.    Ready For Surgery From Anesthesia Perspective.     .

## 2023-10-20 NOTE — BRIEF OP NOTE
Discharge Summary/Instructions after an Endoscopic Procedure    Patient Name: Lon Chin  Patient MRN: [id number]  Patient YOB: 1933    Friday, October 20, 2023  Reymundo Rangel MD  Dear patient,  As a result of recent federal legislation (The Federal Cures Act), you may receive lab or pathology results from your procedure in your [facility] account before your physician is able to contact you. Your physician or their representative will relay the results to you with their recommendations at their soonest availability.  Thank you,    Your health is very important to us during the Covid Crisis. Following your procedure today, you will receive a daily text for 2 weeks asking about signs or symptoms of Covid 19.  Please respond to this text when you receive it so we can follow up and keep you as safe as possible.     RESTRICTIONS:  During your procedure today, you received medications for sedation.  These medications may affect your judgment, balance and coordination.  Therefore, for 24 hours, you have the following restrictions:     - DO NOT drive a car, operate machinery, make legal/financial decisions, sign important papers or drink alcohol.      ACTIVITY:  Today: no heavy lifting, straining or running due to procedural sedation/anesthesia.  The following day: return to full activity including work.    DIET:  Eat and drink normally unless instructed otherwise.     TREATMENT FOR COMMON SIDE EFFECTS:  - Mild abdominal pain, nausea, belching, bloating or excessive gas:  rest, eat lightly and use a heating pad.  - Sore Throat: treat with throat lozenges and/or gargle with warm salt water.  - Because air was used during the procedure, expelling large amounts of air from your rectum or belching is normal.  - If a bowel prep was taken, you may not have a bowel movement for 1-3 days.  This is normal.      SYMPTOMS TO WATCH FOR AND REPORT TO YOUR PHYSICIAN:  1. Abdominal pain or bloating, other than gas  cramps.  2. Chest pain.  3. Back pain.  4. Signs of infection such as: chills or fever occurring within 24 hours after the procedure.  5. Rectal bleeding, which would show as bright red, maroon, or black stools. (A tablespoon of blood from the rectum is not serious, especially if hemorrhoids are present.)  6. Vomiting.  7. Weakness or dizziness.      GO DIRECTLY TO THE NEAREST EMERGENCY ROOM IF YOU HAVE ANY OF THE FOLLOWING:     Difficulty breathing              Chills and/or fever over 101 F   Persistent vomiting and/or vomiting blood   Severe abdominal pain   Severe chest pain   Black, tarry stools   Bleeding- more than one tablespoon   Any other symptom or condition that you feel may need urgent attention    Your doctor recommends these additional instructions:  If any biopsies were taken, your doctors clinic will contact you in 1 to 2 weeks with any results.    - Discharge patient to home (ambulatory).   - Await path results.   - Perform CT scan (computed tomography) of the abdomen with contrast at appointment to be scheduled.   - Refer to an oncologist at appointment to be scheduled.   - Refer to Surgical Oncology at appointment to be scheduled.    For questions, problems or results please call your physician - Reymundo Rangel MD.    EMERGENCY PHONE NUMBER: [phone],  LAB RESULTS: [phone]    IF A COMPLICATION OR EMERGENCY SITUATION ARISES AND YOU ARE UNABLE TO REACH YOUR PHYSICIAN - GO DIRECTLY TO THE EMERGENCY ROOM.

## 2023-10-23 NOTE — ANESTHESIA POSTPROCEDURE EVALUATION
Anesthesia Post Evaluation    Patient: Lon Chin Jr.    Procedure(s) Performed: Procedure(s) (LRB):  EGD (ESOPHAGOGASTRODUODENOSCOPY) (N/A)  ULTRASOUND, UPPER GI TRACT, ENDOSCOPIC (N/A)    Final Anesthesia Type: general      Patient location during evaluation: PACU  Patient participation: Yes- Able to Participate  Level of consciousness: awake and alert  Post-procedure vital signs: reviewed and stable  Pain management: adequate  Airway patency: patent    PONV status at discharge: No PONV  Anesthetic complications: no      Cardiovascular status: stable  Respiratory status: spontaneous ventilation  Hydration status: euvolemic  Follow-up not needed.          Vitals Value Taken Time   /65 10/20/23 1540   Temp  10/23/23 0842   Pulse 64 10/20/23 1540   Resp 18 10/20/23 1540   SpO2 96 % 10/20/23 1540         Event Time   Out of Recovery 15:46:38         Pain/Jamal Score: No data recorded

## 2023-10-24 ENCOUNTER — PATIENT MESSAGE (OUTPATIENT)
Dept: ENDOSCOPY | Facility: HOSPITAL | Age: 88
End: 2023-10-24
Payer: COMMERCIAL

## 2023-10-24 DIAGNOSIS — D49.0 GASTRIC TUMOR: Primary | ICD-10-CM

## 2023-10-25 LAB
FINAL PATHOLOGIC DIAGNOSIS: NORMAL
GROSS: NORMAL
Lab: NORMAL
SUPPLEMENTAL DIAGNOSIS: NORMAL

## 2023-10-26 ENCOUNTER — TELEPHONE (OUTPATIENT)
Dept: HEMATOLOGY/ONCOLOGY | Facility: CLINIC | Age: 88
End: 2023-10-26
Payer: COMMERCIAL

## 2023-10-26 DIAGNOSIS — C16.9 GASTRIC ADENOCARCINOMA: Primary | ICD-10-CM

## 2023-10-26 LAB
COMMENT: NORMAL
FINAL PATHOLOGIC DIAGNOSIS: NORMAL
GROSS: NORMAL
Lab: NORMAL
MICROSCOPIC EXAM: NORMAL
SUPPLEMENTAL DIAGNOSIS: NORMAL

## 2023-10-27 ENCOUNTER — TELEPHONE (OUTPATIENT)
Dept: HEMATOLOGY/ONCOLOGY | Facility: CLINIC | Age: 88
End: 2023-10-27
Payer: COMMERCIAL

## 2023-10-27 DIAGNOSIS — C16.9 GASTRIC ADENOCARCINOMA: Primary | ICD-10-CM

## 2023-10-27 NOTE — TELEPHONE ENCOUNTER
Nurse attempted to call patient. No answer, left voicemail.  Pt's daughter tiffanie is listed in his chart as a contact and email. Nurse called tiffanie to review [employer] sendoff testing and upcoming appts. Tiffanie voiced that she has access to her father's MyChart and accompanies him to his appts. She asked for clarification on the pathology report and nurse explained that the pathology showed cancer.  Tiffanie voiced understanding of the things reviewed and asked for the nurse navigator to call her on Monday - she has a bad headache today.  She is aware to send any questions/concerns via [employer] to his team or to call directly.

## 2023-10-30 ENCOUNTER — TELEPHONE (OUTPATIENT)
Dept: HEMATOLOGY/ONCOLOGY | Facility: CLINIC | Age: 88
End: 2023-10-30
Payer: COMMERCIAL

## 2023-10-31 ENCOUNTER — HOSPITAL ENCOUNTER (OUTPATIENT)
Dept: RADIOLOGY | Facility: HOSPITAL | Age: 88
Discharge: HOME | End: 2023-10-31
Attending: SURGERY
Payer: COMMERCIAL

## 2023-10-31 DIAGNOSIS — C16.9 GASTRIC ADENOCARCINOMA: ICD-10-CM

## 2023-10-31 PROCEDURE — 74178 CT CHEST ABDOMEN PELVIS W W/O CONTRAST (XPD): ICD-10-PCS | Mod: 26,,, | Performed by: RADIOLOGY

## 2023-10-31 PROCEDURE — 25500020 PHARM REV CODE 255: Performed by: SURGERY

## 2023-10-31 PROCEDURE — A9698 NON-RAD CONTRAST MATERIALNOC: HCPCS | Performed by: SURGERY

## 2023-10-31 PROCEDURE — 74178 CT ABD&PLV WO CNTR FLWD CNTR: CPT | Mod: TC

## 2023-10-31 PROCEDURE — 71270 CT CHEST ABDOMEN PELVIS W W/O CONTRAST (XPD): ICD-10-PCS | Mod: 26,,, | Performed by: RADIOLOGY

## 2023-10-31 PROCEDURE — 71270 CT THORAX DX C-/C+: CPT | Mod: 26,,, | Performed by: RADIOLOGY

## 2023-10-31 PROCEDURE — 74178 CT ABD&PLV WO CNTR FLWD CNTR: CPT | Mod: 26,,, | Performed by: RADIOLOGY

## 2023-10-31 RX ADMIN — IOHEXOL 1000 ML: 12 SOLUTION ORAL at 11:10

## 2023-10-31 RX ADMIN — IOHEXOL 100 ML: 350 INJECTION, SOLUTION INTRAVENOUS at 12:10

## 2023-11-02 ENCOUNTER — OFFICE VISIT (OUTPATIENT)
Dept: HEMATOLOGY/ONCOLOGY | Facility: CLINIC | Age: 88
End: 2023-11-02
Payer: COMMERCIAL

## 2023-11-02 ENCOUNTER — OFFICE VISIT (OUTPATIENT)
Dept: SURGERY | Facility: CLINIC | Age: 88
End: 2023-11-02
Payer: COMMERCIAL

## 2023-11-02 VITALS
HEIGHT: 68 IN | SYSTOLIC BLOOD PRESSURE: 116 MMHG | HEART RATE: 74 BPM | DIASTOLIC BLOOD PRESSURE: 55 MMHG | WEIGHT: 209.31 LBS | OXYGEN SATURATION: 100 % | BODY MASS INDEX: 31.72 KG/M2

## 2023-11-02 VITALS
TEMPERATURE: 98 F | RESPIRATION RATE: 18 BRPM | DIASTOLIC BLOOD PRESSURE: 55 MMHG | HEIGHT: 68 IN | OXYGEN SATURATION: 100 % | HEART RATE: 74 BPM | WEIGHT: 209.31 LBS | BODY MASS INDEX: 31.72 KG/M2 | SYSTOLIC BLOOD PRESSURE: 116 MMHG

## 2023-11-02 DIAGNOSIS — I63.9 CEREBROVASCULAR ACCIDENT (CVA), UNSPECIFIED MECHANISM: ICD-10-CM

## 2023-11-02 DIAGNOSIS — C16.3 MALIGNANT NEOPLASM OF PYLORIC ANTRUM: Primary | ICD-10-CM

## 2023-11-02 DIAGNOSIS — C80.1 SOLID MALIGNANT NEOPLASM WITH HIGH-FREQUENCY MICROSATELLITE INSTABILITY (MSI-H): ICD-10-CM

## 2023-11-02 DIAGNOSIS — D50.0 IRON DEFICIENCY ANEMIA DUE TO CHRONIC BLOOD LOSS: ICD-10-CM

## 2023-11-02 PROCEDURE — 1126F PR PAIN SEVERITY QUANTIFIED, NO PAIN PRESENT: ICD-10-PCS | Mod: CPTII,S$GLB,, | Performed by: INTERNAL MEDICINE

## 2023-11-02 PROCEDURE — 1126F AMNT PAIN NOTED NONE PRSNT: CPT | Mod: CPTII,S$GLB,, | Performed by: INTERNAL MEDICINE

## 2023-11-02 PROCEDURE — 3288F FALL RISK ASSESSMENT DOCD: CPT | Mod: CPTII,S$GLB,, | Performed by: INTERNAL MEDICINE

## 2023-11-02 PROCEDURE — 99999 PR PBB SHADOW E&M-EST. PATIENT-LVL IV: CPT | Mod: PBBFAC,,, | Performed by: INTERNAL MEDICINE

## 2023-11-02 PROCEDURE — 99205 OFFICE O/P NEW HI 60 MIN: CPT | Mod: S$GLB,,,

## 2023-11-02 PROCEDURE — 99999 PR PBB SHADOW E&M-EST. PATIENT-LVL IV: ICD-10-PCS | Mod: PBBFAC,,,

## 2023-11-02 PROCEDURE — 3288F FALL RISK ASSESSMENT DOCD: CPT | Mod: CPTII,S$GLB,,

## 2023-11-02 PROCEDURE — 1159F PR MEDICATION LIST DOCUMENTED IN MEDICAL RECORD: ICD-10-PCS | Mod: CPTII,S$GLB,, | Performed by: INTERNAL MEDICINE

## 2023-11-02 PROCEDURE — 99205 OFFICE O/P NEW HI 60 MIN: CPT | Mod: S$GLB,,, | Performed by: INTERNAL MEDICINE

## 2023-11-02 PROCEDURE — 99205 PR OFFICE/OUTPT VISIT, NEW, LEVL V, 60-74 MIN: ICD-10-PCS | Mod: S$GLB,,,

## 2023-11-02 PROCEDURE — 1101F PR PT FALLS ASSESS DOC 0-1 FALLS W/OUT INJ PAST YR: ICD-10-PCS | Mod: CPTII,S$GLB,, | Performed by: INTERNAL MEDICINE

## 2023-11-02 PROCEDURE — 1101F PT FALLS ASSESS-DOCD LE1/YR: CPT | Mod: CPTII,S$GLB,,

## 2023-11-02 PROCEDURE — 3288F PR FALLS RISK ASSESSMENT DOCUMENTED: ICD-10-PCS | Mod: CPTII,S$GLB,,

## 2023-11-02 PROCEDURE — 99205 PR OFFICE/OUTPT VISIT, NEW, LEVL V, 60-74 MIN: ICD-10-PCS | Mod: S$GLB,,, | Performed by: INTERNAL MEDICINE

## 2023-11-02 PROCEDURE — 3288F PR FALLS RISK ASSESSMENT DOCUMENTED: ICD-10-PCS | Mod: CPTII,S$GLB,, | Performed by: INTERNAL MEDICINE

## 2023-11-02 PROCEDURE — 1125F AMNT PAIN NOTED PAIN PRSNT: CPT | Mod: CPTII,S$GLB,,

## 2023-11-02 PROCEDURE — 1159F MED LIST DOCD IN RCRD: CPT | Mod: CPTII,S$GLB,, | Performed by: INTERNAL MEDICINE

## 2023-11-02 PROCEDURE — 1101F PT FALLS ASSESS-DOCD LE1/YR: CPT | Mod: CPTII,S$GLB,, | Performed by: INTERNAL MEDICINE

## 2023-11-02 PROCEDURE — 99999 PR PBB SHADOW E&M-EST. PATIENT-LVL IV: ICD-10-PCS | Mod: PBBFAC,,, | Performed by: INTERNAL MEDICINE

## 2023-11-02 PROCEDURE — 1101F PR PT FALLS ASSESS DOC 0-1 FALLS W/OUT INJ PAST YR: ICD-10-PCS | Mod: CPTII,S$GLB,,

## 2023-11-02 PROCEDURE — 1125F PR PAIN SEVERITY QUANTIFIED, PAIN PRESENT: ICD-10-PCS | Mod: CPTII,S$GLB,,

## 2023-11-02 PROCEDURE — 99999 PR PBB SHADOW E&M-EST. PATIENT-LVL IV: CPT | Mod: PBBFAC,,,

## 2023-11-02 NOTE — PROGRESS NOTES
Seen with Shannon. 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 He was diagnosed after he was found to be profoundly anemic. He has not noticed any ongoing bleeding or dark stool. He has a history of multiple strokes and arrives in a wheelchair, although he is able to walk at home. I think he would be a borderline candidate for distal gastrectomy based on his age and comorbidities. 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 I discussed his case with Dr. Steinberg, and we will plan to start definitive immunotherapy. Palliative radiation could be a consideration here if he were to have ongoing issues with bleeding. Dr. Steinberg will refer him back if he has any problems with immunotherapy and he thinks we should reconsider gastrectomy. For now, I will plan to see him back as needed.    Reagan Uribe MD  Surgical Oncology

## 2023-11-02 NOTE — PROGRESS NOTES
MEDICAL ONCOLOGY - NEW PATIENT VISIT    Reason for visit: Gastric Adenocarcinoma     Best Contact Phone Number(s): [phone] (home)      Cancer/Stage/TNM:    Cancer Staging   No matching staging information was found for the patient.     Oncology History   Malignant neoplasm of pyloric antrum   11/2/2023 Initial Diagnosis    Malignant neoplasm of pyloric antrum     11/16/2023 -  Chemotherapy    Treatment Summary   Plan Name: OP PEMBROLIZUMAB 400MG Q6W  Treatment Goal: Palliative  Status: Active  Start Date: 11/16/2023 (Planned)  End Date: 9/18/2025 (Planned)  Provider: Parish Steinberg MD  Chemotherapy: [No matching medication found in this treatment plan]          HPI:   89 y.o. male presents for evaluation of newly diagnosed gastric adenocarcinoma. He presented to the ED in September 2023 with weakness and was found to have a Hg of 4.2. He underwent and EGD which revealed an ulcerative mass, biopsies revealed high-grade dysplasia. Had an EUS on 10/20/23 which revealed an ulcerating prepyloric mass (T3, possible N1, M0), biopsy positive for invasic adenocarcinoma. HER2 negative but dMMR (loss of MLH1 and PMS2). NGS and pharmacogenomics were sent. CT CAP on 10/31/23 revealed a circumferential pre-pyloric/pyloric thickening, thre are two areas of either extension of the primary mass or enarlged lymph nodes. Patient is doing well at this time. Energy levels improving since he received transfusions. Denies any bleeding. He lives with his daughter in [place]. Is able to get around at home with use of a walker. Had a stroke about 10 years ago and has residual weakness on the left side. Denies any autoimmune conditions.     Patient presents with his son and daughter.  ECOG PS is 1.  History has been obtained by chart review and discussion with the patient.    ROS:   Review of Systems   Constitutional:  Negative for chills and fever.   HENT:  Negative for congestion and sore throat.    Eyes:  Negative for pain.    Respiratory:  Negative for cough and shortness of breath.    Cardiovascular:  Negative for chest pain, palpitations and leg swelling.   Gastrointestinal:  Negative for abdominal pain, diarrhea, nausea and vomiting.   Genitourinary:  Negative for dysuria.   Musculoskeletal:  Negative for back pain.   Neurological:  Positive for weakness (left side). Negative for dizziness and headaches.   Psychiatric/Behavioral:  The patient is not nervous/anxious.        Past Medical History:   Past Medical History:   Diagnosis Date    Arthritis     In back, neck    Bell's palsy feb or march 2013    Blood clot in vein     right leg after back surgery    Cataract associated with other syndromes     GERD (gastroesophageal reflux disease)     Hypertension     Other and unspecified hyperlipidemia     Stroke     TIA (transient ischemic attack)     Feb or march 2013        Past Surgical History:   Past Surgical History:   Procedure Laterality Date    APPENDECTOMY      BACK SURGERY      COLONOSCOPY N/A 9/29/2023    Procedure: COLONOSCOPY;  Surgeon: Jan Lozano MD;  Location: [facility];  Service: Gastroenterology;  Laterality: N/A;    ENDOSCOPIC ULTRASOUND OF UPPER GASTROINTESTINAL TRACT N/A 10/20/2023    Procedure: ULTRASOUND, UPPER GI TRACT, ENDOSCOPIC;  Surgeon: Reymundo Rangel MD;  Location: [facility];  Service: Endoscopy;  Laterality: N/A;    ESOPHAGOGASTRODUODENOSCOPY N/A 9/29/2023    Procedure: EGD (ESOPHAGOGASTRODUODENOSCOPY);  Surgeon: Jan Lozano MD;  Location: [facility];  Service: Gastroenterology;  Laterality: N/A;    ESOPHAGOGASTRODUODENOSCOPY N/A 10/20/2023    Procedure: EGD (ESOPHAGOGASTRODUODENOSCOPY);  Surgeon: Reymundo Rangel MD;  Location: [facility];  Service: Endoscopy;  Laterality: N/A;  urgent EGD/EUS (radial) for gastric ulcerated mass (HGD)   Referring: Jan Lozano MD  10/17/23-Pt is no longer on Plavix, H/O CVA with left hemiparesis, instructions via portal-DS     PROSTATE SURGERY          Family History:   Family History   Problem Relation Age of Onset    Stroke Father     Hypertension Son         Social History:   Social History     Tobacco Use    Smoking status: Former     Current packs/day: 0.00     Types: Cigarettes     Quit date: 1955     Years since quittin.8    Smokeless tobacco: Not on file   Substance Use Topics    Alcohol use: No        I have reviewed and updated the patient's past medical, surgical, family and social histories.    Allergies:   Review of patient's allergies indicates:   Allergen Reactions    Gabapentin (bulk) Other (See Comments)     Severe constipation    Prednisone         Medications:   Current Outpatient Medications   Medication Sig Dispense Refill    atorvastatin (LIPITOR) 20 MG tablet Take 1 tablet (20 mg total) by mouth once daily. 30 tablet 1    bumetanide (BUMEX) 1 MG tablet Take 1 mg by mouth once daily.      clopidogrel (PLAVIX) 75 mg tablet Take 75 mg by mouth once daily.      docusate sodium (COLACE) 100 MG capsule Take 1 capsule (100 mg total) by mouth 2 (two) times daily as needed (while taking pain medication). 20 capsule 0    hydroCHLOROthiazide (HYDRODIURIL) 25 MG tablet Take 25 mg by mouth once daily.      HYDROcodone-acetaminophen (NORCO) 5-325 mg per tablet Take 1 tablet by mouth every 6 (six) hours as needed for Pain. 11 tablet 0    irbesartan (AVAPRO) 300 MG tablet Take 300 mg by mouth once daily.      multivitamin-minerals-lutein (MULTIVITAMIN 50 PLUS) Tab Take 1 tablet by mouth once daily.      nebivolol (BYSTOLIC) 10 MG Tab Take 1 tablet (10 mg total) by mouth once daily. 30 tablet 11    omeprazole (PRILOSEC OTC) 20 MG tablet Take 2 tablets (40 mg total) by mouth once daily. 122 tablet 0    oxybutynin (DITROPAN-XL) 10 MG 24 hr tablet Take 10 mg by mouth once daily.      potassium chloride SA (KLOR-CON M20) 20 MEQ tablet Take 1 tablet (20 mEq total) by mouth once daily. 30 tablet 11    saw palmetto 500 MG capsule  "Take 500 mg by mouth once daily.      zinc gluconate 50 mg tablet Take 50 mg by mouth once daily.       No current facility-administered medications for this visit.        Physical Exam:   BP (!) 116/55 (BP Location: Left arm, Patient Position: Sitting, BP Method: Large (Automatic))   Pulse 74   Temp 98.2 °F (36.8 °C) (Oral)   Resp 18   Ht 5' 8" (1.727 m)   Wt 95 kg (209 lb 5.2 oz)   SpO2 100%   BMI 31.83 kg/m²                Physical Exam  Constitutional:       Appearance: Normal appearance.   HENT:      Head: Normocephalic and atraumatic.      Mouth/Throat:      Mouth: Mucous membranes are moist.      Pharynx: Oropharynx is clear.   Eyes:      Extraocular Movements: Extraocular movements intact.      Pupils: Pupils are equal, round, and reactive to light.   Cardiovascular:      Rate and Rhythm: Normal rate and regular rhythm.      Pulses: Normal pulses.      Heart sounds: Normal heart sounds.   Pulmonary:      Effort: Pulmonary effort is normal.      Breath sounds: Normal breath sounds.   Abdominal:      General: Abdomen is flat.      Palpations: Abdomen is soft.      Tenderness: There is no abdominal tenderness.   Musculoskeletal:         General: Normal range of motion.      Cervical back: Normal range of motion and neck supple.      Right lower leg: No edema.      Left lower leg: No edema.   Skin:     General: Skin is warm and dry.   Neurological:      General: No focal deficit present.      Mental Status: He is alert and oriented to person, place, and time.      Motor: Weakness (left side slightly weaker than right) present.   Psychiatric:         Mood and Affect: Mood normal.         Behavior: Behavior normal.           Labs:   Recent Results (from the past 48 hour(s))   Tumor NGS Tissue    Collection Time: 11/01/23  4:08 PM    Specimen: Tissue   Result Value Ref Range    [facility] Portal       " [email or web address]    PD-L1 (22C3) Combined Positive Score 5     PD-L1 (22C3) Tumor Proportion Score 2 %        Imaging:    10/31/23 CT CAP - Circumferential wall thickening at the pre-pyloric/pyloric region in this patient with known adenocarcinoma.  Area of nodular soft tissue excrescence versus two adjacent mildly enlarged nodes concerning for metastasis.    Path:   10/20/23 - Gastric biopsy with invasive adenocarcinoma. Loss of nuclear expression of MLH1 and PMS2.       Assessment:       1. Malignant neoplasm of pyloric antrum    2. Iron deficiency anemia due to chronic blood loss    3. Cerebrovascular accident (CVA), unspecified mechanism          Plan:        # Malignant Neoplasm of Pyloric Antrum   Patient diagnosed with gastric adenocarcinoma on 10/20/23. No signs of metastasis on imaging. Patient is not a candidate for surgery or chemotherapy given his age and co-morbidities. His tumor was MSI-H (loss of nuclear expression of MLH1 and PMS2) so he is a candidate for immunotherapy. Spoke with patient and his family about the risks and benefits of immunotherapy and they are in agreement with starting it.     - Keytruda 400mg q6w   - Follow-up in 2-3 weeks prior to starting Keytruda   - Will get repeat CT before 3rd dose of Keytruda     # JOSE JUAN   - Secondary to blood loss from gastric mass  - Will schedule IV iron infusions     # CVA  - some residual deficits but remains ambulatory with assistive devices at home.    Follow up: 2-3 weeeks, prior to starting Keytruda      The above information has been reviewed with the patient and all questions have been answered to their apparent satisfaction.  They understand that they can call the clinic with any questions.    Case discussed with Dr. Steinberg.     Case Washington MD  Hematology/Oncology Fellow PGY-IV    Route Chart for Scheduling    Med Onc Chart  Routing  Urgent    Follow up with physician    Follow up with [facility] 2 weeks. for cycle 1 of Keytruda   Infusion scheduling note   needs scheduling of 2 doses of injectafer once authorized - one week apart   Injection scheduling note    Labs CBC and CMP   Scheduling:  Preferred lab:  Lab interval:     Imaging    Pharmacy appointment    Other referrals                  Treatment Plan Information   OP PEMBROLIZUMAB 400MG Q6W   Parish Steinberg MD   Upcoming Treatment Dates - OP PEMBROLIZUMAB 400MG Q6W    11/16/2023       Chemotherapy       pembrolizumab (KEYTRUDA) 400 mg in sodium chloride 0.9% SolP 116 mL infusion  12/28/2023       Chemotherapy       pembrolizumab (KEYTRUDA) 400 mg in sodium chloride 0.9% SolP 116 mL infusion  2/8/2024       Chemotherapy       pembrolizumab (KEYTRUDA) 400 mg in sodium chloride 0.9% SolP 116 mL infusion  3/21/2024       Chemotherapy       pembrolizumab (KEYTRUDA) 400 mg in sodium chloride 0.9% SolP 116 mL infusion

## 2023-11-03 PROBLEM — C80.1: Status: ACTIVE | Noted: 2023-11-03

## 2023-11-03 RX ORDER — HEPARIN 100 UNIT/ML
5 SYRINGE INTRAVENOUS
Status: CANCELLED | OUTPATIENT
Start: 2023-11-10

## 2023-11-03 RX ORDER — SODIUM CHLORIDE 9 MG/ML
INJECTION, SOLUTION INTRAVENOUS CONTINUOUS
Status: CANCELLED | OUTPATIENT
Start: 2023-11-10

## 2023-11-03 RX ORDER — SODIUM CHLORIDE 0.9 % (FLUSH) 0.9 %
10 SYRINGE (ML) INJECTION
Status: CANCELLED | OUTPATIENT
Start: 2023-11-10

## 2023-11-03 RX ORDER — DIPHENHYDRAMINE HYDROCHLORIDE 50 MG/ML
50 INJECTION INTRAMUSCULAR; INTRAVENOUS ONCE AS NEEDED
Status: CANCELLED | OUTPATIENT
Start: 2023-11-10

## 2023-11-03 RX ORDER — EPINEPHRINE 0.3 MG/.3ML
0.3 INJECTION SUBCUTANEOUS ONCE AS NEEDED
Status: CANCELLED | OUTPATIENT
Start: 2023-11-10

## 2023-11-09 LAB
DNA RANGE(S) EXAMINED NAR: NORMAL
GENE DIS ANL INTERP-IMP: POSITIVE
GENE DIS ASSESSED: NORMAL
MSI CA SPEC-IMP: NORMAL
REASON FOR STUDY: NORMAL
TEMPUS LCA: NORMAL
TEMPUS PORTAL: NORMAL
TEMPUS THERAPY1: NORMAL
TEMPUS THERAPY2: NORMAL
TEMPUS THERAPYCOUNT: 2
TEMPUS TRIAL1: NORMAL
TEMPUS TRIAL2: NORMAL
TEMPUS TRIAL3: NORMAL
TEMPUS TRIALCOUNT: 3

## 2023-11-09 NOTE — PROGRESS NOTES
Encounter Date:  2023    Patient ID: Lon Chin Jr.  Age:  89 y.o. :  2/15/1933    Chief Complaint   Patient presents with    Consult     History:    Mr. Chin is a 89 y.o. male who presents with gastric adenocarcinoma. This was diagnosed on workup for profound anemia. He states that he did not have dark or bloody stools for any other ongoing bleeding. He is tolerating a regular diet and is having bowel function. He has not weighed himself but does not report any weight loss. Denies fever, chills, N/V/D, abd pain, SOB, chest pain, trouble swallowing, early satiety.     Referred by: Dr. Rangel    Prior abd surgery: Appendectomy   Family hx of CA  Currently taking plavix   Former smoker     Data:     Radiology:  Dr. Uribe and I personally reviewed these images:   10/31/23: CT Chest A/P:  Impression:  Circumferential wall thickening at the pre-pyloric/pyloric region in this patient with known adenocarcinoma.  Area of nodular soft tissue excrescence versus two adjacent mildly enlarged nodes concerning for metastasis.     No evidence for intrathoracic metastasis.    Endoscopy:    10/20/23: EUS:  Impression:     - Normal esophagus.                          - Rule out malignancy, gastric tumor at the                          pylorus and in the prepyloric region of the                          stomach. Biopsied.                          - Normal examined duodenum.                          - There was no evidence of significant pathology                          in the visualized portion of the liver.                          - One malignant-appearing lymph node was                          visualized in the elham hepatis region.                          - A mass was found in the prepyloric region of the                          stomach and in the pylorus. This was staged T3                          (based on invasion into) possible N1 M0 by                          endosonographic criteria. The staging  applies if                          malignancy is confirmed.     9/29/23: EGD/Colonoscopy:  Impression:     - Widely patent Schatzki ring.                          - Medium-sized hiatal hernia.                          - Ulcerated mucosa in the pylorus. Biopsied.                          - Normal examined duodenum.   Impression:     - Preparation of the colon was fair.                          - Stool in the entire examined colon.                          - No specimens collected.                          - No source of bleeding noted.     Pathology:    10/20/23: From EUS:  Final Pathologic Diagnosis   Stomach, biopsy of mass:  - Invasive adenocarcinoma, moderately differentiated  - HER2 and mismatch repair protein (MMR) IHC pending; results to follow in a supplemental report     Labs:    Lab Results   Component Value Date    WBC 7.30 10/31/2023    HGB 7.3 (L) 10/31/2023    HCT 25.0 (L) 10/31/2023    MCV 79 (L) 10/31/2023     10/31/2023       Chemistry        Component Value Date/Time     10/31/2023 0911    K 3.8 10/31/2023 0911     10/31/2023 0911    CO2 26 10/31/2023 0911    BUN 26 (H) 10/31/2023 0911    CREATININE 1.2 10/31/2023 0911     (H) 10/31/2023 0911        Component Value Date/Time    CALCIUM 9.4 10/31/2023 0911    ALKPHOS 72 10/31/2023 0911    AST 50 (H) 10/31/2023 0911    ALT 32 10/31/2023 0911    BILITOT 0.3 10/31/2023 0911    ESTGFRAFRICA >60.0 10/29/2019 1824    EGFRNONAA >60.0 10/29/2019 1824        Lab Results   Component Value Date    HGBA1C 5.9 04/01/2013     Past Medical History:   Diagnosis Date    Arthritis     In back, neck    Bell's palsy feb or march 2013    Blood clot in vein     right leg after back surgery    Cataract associated with other syndromes     GERD (gastroesophageal reflux disease)     Hypertension     Other and unspecified hyperlipidemia     Stroke     TIA (transient ischemic attack)     Feb or march 2013     Past Surgical History:   Procedure  Laterality Date    APPENDECTOMY      BACK SURGERY      COLONOSCOPY N/A 9/29/2023    Procedure: COLONOSCOPY;  Surgeon: Jan Lozano MD;  Location: [facility];  Service: Gastroenterology;  Laterality: N/A;    ENDOSCOPIC ULTRASOUND OF UPPER GASTROINTESTINAL TRACT N/A 10/20/2023    Procedure: ULTRASOUND, UPPER GI TRACT, ENDOSCOPIC;  Surgeon: Reyumndo Rangel MD;  Location: [facility] ENDO;  Service: Endoscopy;  Laterality: N/A;    ESOPHAGOGASTRODUODENOSCOPY N/A 9/29/2023    Procedure: EGD (ESOPHAGOGASTRODUODENOSCOPY);  Surgeon: Jan Lozano MD;  Location: [facility] END[facility];  Service: Gastroenterology;  Laterality: N/A;    ESOPHAGOGASTRODUODENOSCOPY N/A 10/20/2023    Procedure: EGD (ESOPHAGOGASTRODUODENOSCOPY);  Surgeon: Reymundo Rangel MD;  Location: [facility] END[facility];  Service: Endoscopy;  Laterality: N/A;  urgent EGD/EUS (radial) for gastric ulcerated mass (HGD)   Referring: Jan Lozano MD  10/17/23-Pt is no longer on Plavix, H/O CVA with left hemiparesis, instructions via portal-DS    PROSTATE SURGERY       Current Outpatient Medications on File Prior to Visit   Medication Sig Dispense Refill    atorvastatin (LIPITOR) 20 MG tablet Take 1 tablet (20 mg total) by mouth once daily. 30 tablet 1    bumetanide (BUMEX) 1 MG tablet Take 1 mg by mouth once daily.      docusate sodium (COLACE) 100 MG capsule Take 1 capsule (100 mg total) by mouth 2 (two) times daily as needed (while taking pain medication). 20 capsule 0    hydroCHLOROthiazide (HYDRODIURIL) 25 MG tablet Take 25 mg by mouth once daily.      HYDROcodone-acetaminophen (NORCO) 5-325 mg per tablet Take 1 tablet by mouth every 6 (six) hours as needed for Pain. 11 tablet 0    irbesartan (AVAPRO) 300 MG tablet Take 300 mg by mouth once daily.      multivitamin-minerals-lutein (MULTIVITAMIN 50 PLUS) Tab Take 1 tablet by mouth once daily.      nebivolol (BYSTOLIC) 10 MG Tab Take 1 tablet (10 mg total) by mouth once daily. 30 tablet 11    omeprazole  "(PRILOSEC OTC) 20 MG tablet Take 2 tablets (40 mg total) by mouth once daily. 122 tablet 0    oxybutynin (DITROPAN-XL) 10 MG 24 hr tablet Take 10 mg by mouth once daily.      potassium chloride SA (KLOR-CON M20) 20 MEQ tablet Take 1 tablet (20 mEq total) by mouth once daily. 30 tablet 11    saw palmetto 500 MG capsule Take 500 mg by mouth once daily.      zinc gluconate 50 mg tablet Take 50 mg by mouth once daily.      clopidogrel (PLAVIX) 75 mg tablet Take 75 mg by mouth once daily.       No current facility-administered medications on file prior to visit.     Review of patient's allergies indicates:   Allergen Reactions    Gabapentin (bulk) Other (See Comments)     Severe constipation    Prednisone      Family History:  His family history includes Hypertension in his son; Stroke in his father.     Social History:   reports that he quit smoking about 68 years ago. His smoking use included cigarettes. He does not have any smokeless tobacco history on file. He reports that he does not drink alcohol.     ROS:     Review of Systems   Constitutional:  Positive for activity change, appetite change and fatigue. Negative for unexpected weight change.   HENT:  Negative for trouble swallowing.    Respiratory: Negative.     Cardiovascular: Negative.    Gastrointestinal:  Positive for blood in stool. Negative for abdominal distention, abdominal pain, diarrhea, nausea and vomiting.   Skin: Negative.    Neurological: Negative.    Pertinent positive/negatives detailed in HPI, all other systems negative.    Physical Exam:  BP (!) 116/55   Pulse 74   Ht 5' 8" (1.727 m)   Wt 95 kg (209 lb 5.2 oz)   SpO2 100%   BMI 31.83 kg/m²     Constitutional:  Non-toxic, no acute distress.  Eyes:  Sclerae anicteric, gaze symmetrical  Resp:  Even and unlabored resp  Abd:  Soft, non-tender, no masses, no ascites, no superficial varices  Musculoskeletal: arrives in wheelchair, muscle wasting  Neuro:  No gross deficits  Psych:  Awake, alert, " oriented.  Answers and asks questions appropriately      ICD-10-CM ICD-9-CM    1. Malignant neoplasm of pyloric antrum  C16.3 151.2       Plan:  Mr. Chin is a 89 y.o. male who presents with gastric adenocarcinoma. This was diagnosed on workup for profound anemia. He states that he did not have dark or bloody stools for any other ongoing bleeding. We discussed his imaging as well as the results from his EUS. We discussed the nature and treatment of gastric cancers. He would need a distal gastrectomy and would be considered a borderline candidate because of his functional status and his previous strokes. 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 He is seeing Dr. Steinberg with plans to start definitive immunotherapy. Questions were asked and answered to patient's satisfaction.  We discussed the need for continued clinical/radiographic/endoscopic follow-up.    Follow up if symptoms worsen or fail to improve.    This patient was seen in conjunction with Dr. Uribe.        Shannon King NP  Upper GI / Hepatobiliary Surgical Oncology  [facility] [place], [place]  Office: [phone]  Fax: [phone]

## 2023-11-15 ENCOUNTER — TELEPHONE (OUTPATIENT)
Dept: HEMATOLOGY/ONCOLOGY | Facility: CLINIC | Age: 88
End: 2023-11-15
Payer: COMMERCIAL

## 2023-11-15 NOTE — TELEPHONE ENCOUNTER
I called patient to review [facility] financial assistance, [facility] and sent [employer] message.

## 2023-11-20 ENCOUNTER — LAB VISIT (OUTPATIENT)
Dept: LAB | Facility: HOSPITAL | Age: 88
End: 2023-11-20
Attending: INTERNAL MEDICINE
Payer: COMMERCIAL

## 2023-11-20 ENCOUNTER — HOSPITAL ENCOUNTER (EMERGENCY)
Facility: HOSPITAL | Age: 88
Discharge: HOME | End: 2023-11-20
Attending: EMERGENCY MEDICINE
Payer: COMMERCIAL

## 2023-11-20 ENCOUNTER — OFFICE VISIT (OUTPATIENT)
Dept: HEMATOLOGY/ONCOLOGY | Facility: CLINIC | Age: 88
End: 2023-11-20
Payer: COMMERCIAL

## 2023-11-20 ENCOUNTER — DOCUMENTATION ONLY (OUTPATIENT)
Dept: HEMATOLOGY/ONCOLOGY | Facility: CLINIC | Age: 88
End: 2023-11-20
Payer: COMMERCIAL

## 2023-11-20 VITALS
TEMPERATURE: 98 F | DIASTOLIC BLOOD PRESSURE: 60 MMHG | HEART RATE: 69 BPM | HEART RATE: 76 BPM | SYSTOLIC BLOOD PRESSURE: 127 MMHG | BODY MASS INDEX: 31.67 KG/M2 | OXYGEN SATURATION: 100 % | OXYGEN SATURATION: 99 % | SYSTOLIC BLOOD PRESSURE: 159 MMHG | RESPIRATION RATE: 17 BRPM | TEMPERATURE: 98 F | RESPIRATION RATE: 18 BRPM | HEIGHT: 68 IN | DIASTOLIC BLOOD PRESSURE: 75 MMHG | WEIGHT: 209 LBS

## 2023-11-20 DIAGNOSIS — D64.9 ANEMIA, UNSPECIFIED TYPE: Primary | ICD-10-CM

## 2023-11-20 DIAGNOSIS — C16.3 MALIGNANT NEOPLASM OF PYLORIC ANTRUM: ICD-10-CM

## 2023-11-20 DIAGNOSIS — R53.0 NEOPLASTIC (MALIGNANT) RELATED FATIGUE: ICD-10-CM

## 2023-11-20 DIAGNOSIS — D50.0 IRON DEFICIENCY ANEMIA DUE TO CHRONIC BLOOD LOSS: ICD-10-CM

## 2023-11-20 DIAGNOSIS — M79.89 LEFT LEG SWELLING: ICD-10-CM

## 2023-11-20 DIAGNOSIS — C16.3 MALIGNANT NEOPLASM OF PYLORIC ANTRUM: Primary | ICD-10-CM

## 2023-11-20 DIAGNOSIS — I63.9 CEREBROVASCULAR ACCIDENT (CVA), UNSPECIFIED MECHANISM: ICD-10-CM

## 2023-11-20 DIAGNOSIS — D63.0 ANEMIA IN NEOPLASTIC DISEASE: ICD-10-CM

## 2023-11-20 LAB
ABO + RH BLD: NORMAL
ALBUMIN SERPL BCP-MCNC: 3 G/DL (ref 3.5–5.2)
ALBUMIN SERPL BCP-MCNC: 3 G/DL (ref 3.5–5.2)
ALP SERPL-CCNC: 73 U/L (ref 55–135)
ALP SERPL-CCNC: 73 U/L (ref 55–135)
ALT SERPL W/O P-5'-P-CCNC: 21 U/L (ref 10–44)
ALT SERPL W/O P-5'-P-CCNC: 21 U/L (ref 10–44)
ANION GAP SERPL CALC-SCNC: 10 MMOL/L (ref 8–16)
ANION GAP SERPL CALC-SCNC: 9 MMOL/L (ref 8–16)
APTT PPP: 24.2 SEC (ref 21–32)
AST SERPL-CCNC: 35 U/L (ref 10–40)
AST SERPL-CCNC: 68 U/L (ref 10–40)
BASOPHILS # BLD AUTO: 0.03 K/UL (ref 0–0.2)
BASOPHILS NFR BLD: 0.4 % (ref 0–1.9)
BILIRUB SERPL-MCNC: 0.2 MG/DL (ref 0.1–1)
BILIRUB SERPL-MCNC: 0.2 MG/DL (ref 0.1–1)
BLD GP AB SCN CELLS X3 SERPL QL: NORMAL
BLD PROD TYP BPU: NORMAL
BLD PROD TYP BPU: NORMAL
BLOOD UNIT EXPIRATION DATE: NORMAL
BLOOD UNIT EXPIRATION DATE: NORMAL
BLOOD UNIT TYPE CODE: 2800
BLOOD UNIT TYPE CODE: 6200
BLOOD UNIT TYPE: NORMAL
BLOOD UNIT TYPE: NORMAL
BUN SERPL-MCNC: 29 MG/DL (ref 8–23)
BUN SERPL-MCNC: 32 MG/DL (ref 8–23)
CALCIUM SERPL-MCNC: 8.9 MG/DL (ref 8.7–10.5)
CALCIUM SERPL-MCNC: 9.3 MG/DL (ref 8.7–10.5)
CHLORIDE SERPL-SCNC: 101 MMOL/L (ref 95–110)
CHLORIDE SERPL-SCNC: 101 MMOL/L (ref 95–110)
CO2 SERPL-SCNC: 26 MMOL/L (ref 23–29)
CO2 SERPL-SCNC: 26 MMOL/L (ref 23–29)
CODING SYSTEM: NORMAL
CODING SYSTEM: NORMAL
CREAT SERPL-MCNC: 1.2 MG/DL (ref 0.5–1.4)
CREAT SERPL-MCNC: 1.2 MG/DL (ref 0.5–1.4)
CROSSMATCH INTERPRETATION: NORMAL
CROSSMATCH INTERPRETATION: NORMAL
DIFFERENTIAL METHOD: ABNORMAL
DISPENSE STATUS: NORMAL
DISPENSE STATUS: NORMAL
EOSINOPHIL # BLD AUTO: 0.3 K/UL (ref 0–0.5)
EOSINOPHIL NFR BLD: 4.5 % (ref 0–8)
ERYTHROCYTE [DISTWIDTH] IN BLOOD BY AUTOMATED COUNT: 24.5 % (ref 11.5–14.5)
ERYTHROCYTE [DISTWIDTH] IN BLOOD BY AUTOMATED COUNT: 26.3 % (ref 11.5–14.5)
EST. GFR  (NO RACE VARIABLE): 57.4 ML/MIN/1.73 M^2
EST. GFR  (NO RACE VARIABLE): 57.4 ML/MIN/1.73 M^2
GLUCOSE SERPL-MCNC: 106 MG/DL (ref 70–110)
GLUCOSE SERPL-MCNC: 92 MG/DL (ref 70–110)
HCT VFR BLD AUTO: 18.9 % (ref 40–54)
HCT VFR BLD AUTO: 19.5 % (ref 40–54)
HGB BLD-MCNC: 5.7 G/DL (ref 14–18)
HGB BLD-MCNC: 5.7 G/DL (ref 14–18)
IMM GRANULOCYTES # BLD AUTO: 0.01 K/UL (ref 0–0.04)
IMM GRANULOCYTES # BLD AUTO: 0.02 K/UL (ref 0–0.04)
IMM GRANULOCYTES NFR BLD AUTO: 0.1 % (ref 0–0.5)
LYMPHOCYTES # BLD AUTO: 1.2 K/UL (ref 1–4.8)
LYMPHOCYTES NFR BLD: 17.8 % (ref 18–48)
MCH RBC QN AUTO: 21.9 PG (ref 27–31)
MCH RBC QN AUTO: 22.4 PG (ref 27–31)
MCHC RBC AUTO-ENTMCNC: 29.2 G/DL (ref 32–36)
MCHC RBC AUTO-ENTMCNC: 30.2 G/DL (ref 32–36)
MCV RBC AUTO: 74 FL (ref 82–98)
MCV RBC AUTO: 75 FL (ref 82–98)
MONOCYTES # BLD AUTO: 1.1 K/UL (ref 0.3–1)
MONOCYTES NFR BLD: 16.9 % (ref 4–15)
NEUTROPHILS # BLD AUTO: 3.5 K/UL (ref 1.8–7.7)
NEUTROPHILS # BLD AUTO: 4 K/UL (ref 1.8–7.7)
NEUTROPHILS NFR BLD: 60.3 % (ref 38–73)
NRBC BLD-RTO: 0 /100 WBC
NUM UNITS TRANS PACKED RBC: NORMAL
NUM UNITS TRANS PACKED RBC: NORMAL
PLATELET # BLD AUTO: 329 K/UL (ref 150–450)
PLATELET # BLD AUTO: 390 K/UL (ref 150–450)
PMV BLD AUTO: 9 FL (ref 9.2–12.9)
PMV BLD AUTO: 9.6 FL (ref 9.2–12.9)
POTASSIUM SERPL-SCNC: 3.7 MMOL/L (ref 3.5–5.1)
POTASSIUM SERPL-SCNC: 4.4 MMOL/L (ref 3.5–5.1)
PROT SERPL-MCNC: 6.6 G/DL (ref 6–8.4)
PROT SERPL-MCNC: 7 G/DL (ref 6–8.4)
RBC # BLD AUTO: 2.54 M/UL (ref 4.6–6.2)
RBC # BLD AUTO: 2.6 M/UL (ref 4.6–6.2)
SODIUM SERPL-SCNC: 136 MMOL/L (ref 136–145)
SODIUM SERPL-SCNC: 137 MMOL/L (ref 136–145)
SPECIMEN OUTDATE: NORMAL
TSH SERPL DL<=0.005 MIU/L-ACNC: 1.58 UIU/ML (ref 0.4–4)
WBC # BLD AUTO: 6.26 K/UL (ref 3.9–12.7)
WBC # BLD AUTO: 6.7 K/UL (ref 3.9–12.7)

## 2023-11-20 PROCEDURE — 1101F PR PT FALLS ASSESS DOC 0-1 FALLS W/OUT INJ PAST YR: ICD-10-PCS | Mod: CPTII,S$GLB,, | Performed by: PHYSICIAN ASSISTANT

## 2023-11-20 PROCEDURE — 1125F AMNT PAIN NOTED PAIN PRSNT: CPT | Mod: CPTII,S$GLB,, | Performed by: PHYSICIAN ASSISTANT

## 2023-11-20 PROCEDURE — 80053 COMPREHEN METABOLIC PANEL: CPT | Mod: 91 | Performed by: INTERNAL MEDICINE

## 2023-11-20 PROCEDURE — 85025 COMPLETE CBC W/AUTO DIFF WBC: CPT | Performed by: EMERGENCY MEDICINE

## 2023-11-20 PROCEDURE — P9016 RBC LEUKOCYTES REDUCED: HCPCS | Performed by: EMERGENCY MEDICINE

## 2023-11-20 PROCEDURE — 86850 RBC ANTIBODY SCREEN: CPT | Performed by: EMERGENCY MEDICINE

## 2023-11-20 PROCEDURE — 99215 PR OFFICE/OUTPT VISIT, EST, LEVL V, 40-54 MIN: ICD-10-PCS | Mod: S$GLB,,, | Performed by: PHYSICIAN ASSISTANT

## 2023-11-20 PROCEDURE — 99285 EMERGENCY DEPT VISIT HI MDM: CPT | Mod: 25

## 2023-11-20 PROCEDURE — 80053 COMPREHEN METABOLIC PANEL: CPT | Performed by: EMERGENCY MEDICINE

## 2023-11-20 PROCEDURE — 85730 THROMBOPLASTIN TIME PARTIAL: CPT | Performed by: EMERGENCY MEDICINE

## 2023-11-20 PROCEDURE — 99215 OFFICE O/P EST HI 40 MIN: CPT | Mod: S$GLB,,, | Performed by: PHYSICIAN ASSISTANT

## 2023-11-20 PROCEDURE — 1159F MED LIST DOCD IN RCRD: CPT | Mod: CPTII,S$GLB,, | Performed by: PHYSICIAN ASSISTANT

## 2023-11-20 PROCEDURE — 3288F FALL RISK ASSESSMENT DOCD: CPT | Mod: CPTII,S$GLB,, | Performed by: PHYSICIAN ASSISTANT

## 2023-11-20 PROCEDURE — 1160F PR REVIEW ALL MEDS BY PRESCRIBER/CLIN PHARMACIST DOCUMENTED: ICD-10-PCS | Mod: CPTII,S$GLB,, | Performed by: PHYSICIAN ASSISTANT

## 2023-11-20 PROCEDURE — 1101F PT FALLS ASSESS-DOCD LE1/YR: CPT | Mod: CPTII,S$GLB,, | Performed by: PHYSICIAN ASSISTANT

## 2023-11-20 PROCEDURE — 99999 PR PBB SHADOW E&M-EST. PATIENT-LVL IV: CPT | Mod: PBBFAC,,, | Performed by: PHYSICIAN ASSISTANT

## 2023-11-20 PROCEDURE — 84443 ASSAY THYROID STIM HORMONE: CPT | Performed by: EMERGENCY MEDICINE

## 2023-11-20 PROCEDURE — 99999 PR PBB SHADOW E&M-EST. PATIENT-LVL IV: ICD-10-PCS | Mod: PBBFAC,,, | Performed by: PHYSICIAN ASSISTANT

## 2023-11-20 PROCEDURE — 1125F PR PAIN SEVERITY QUANTIFIED, PAIN PRESENT: ICD-10-PCS | Mod: CPTII,S$GLB,, | Performed by: PHYSICIAN ASSISTANT

## 2023-11-20 PROCEDURE — 85027 COMPLETE CBC AUTOMATED: CPT | Performed by: INTERNAL MEDICINE

## 2023-11-20 PROCEDURE — 86920 COMPATIBILITY TEST SPIN: CPT | Performed by: EMERGENCY MEDICINE

## 2023-11-20 PROCEDURE — 1160F RVW MEDS BY RX/DR IN RCRD: CPT | Mod: CPTII,S$GLB,, | Performed by: PHYSICIAN ASSISTANT

## 2023-11-20 PROCEDURE — 1159F PR MEDICATION LIST DOCUMENTED IN MEDICAL RECORD: ICD-10-PCS | Mod: CPTII,S$GLB,, | Performed by: PHYSICIAN ASSISTANT

## 2023-11-20 PROCEDURE — 36430 TRANSFUSION BLD/BLD COMPNT: CPT

## 2023-11-20 PROCEDURE — 3288F PR FALLS RISK ASSESSMENT DOCUMENTED: ICD-10-PCS | Mod: CPTII,S$GLB,, | Performed by: PHYSICIAN ASSISTANT

## 2023-11-20 PROCEDURE — 36415 COLL VENOUS BLD VENIPUNCTURE: CPT | Performed by: INTERNAL MEDICINE

## 2023-11-20 RX ORDER — HYDROCODONE BITARTRATE AND ACETAMINOPHEN 500; 5 MG/1; MG/1
TABLET ORAL
Status: DISCONTINUED | OUTPATIENT
Start: 2023-11-20 | End: 2023-11-21 | Stop reason: HOSPADM

## 2023-11-20 NOTE — PROGRESS NOTES
MEDICAL ONCOLOGY - NEW PATIENT VISIT    Reason for visit: Gastric Adenocarcinoma     Best Contact Phone Number(s): [phone] (home)      Cancer/Stage/TNM:    Cancer Staging   Malignant neoplasm of pyloric antrum  Staging form: Stomach, AJCC 8th Edition  - Clinical stage from 10/20/2023: Stage III (cT3, cN1, cM0) - Signed by Parish Steinberg MD on 11/3/2023       Oncology History   Malignant neoplasm of pyloric antrum   10/20/2023 Cancer Staged    Staging form: Stomach, AJCC 8th Edition  - Clinical stage from 10/20/2023: Stage III (cT3, cN1, cM0)     11/2/2023 Initial Diagnosis    Malignant neoplasm of pyloric antrum     11/16/2023 -  Chemotherapy    Treatment Summary   Plan Name: OP PEMBROLIZUMAB 400MG Q6W  Treatment Goal: Palliative  Status: Active  Start Date: 11/16/2023 (Planned)  End Date: 9/18/2025 (Planned)  Provider: Parish Steinberg MD  Chemotherapy: [No matching medication found in this treatment plan]          HPI:   89 y.o. male presents for evaluation of newly diagnosed gastric adenocarcinoma. He presented to the ED in September 2023 with weakness and was found to have a Hg of 4.2. He underwent and EGD which revealed an ulcerative mass, biopsies revealed high-grade dysplasia. Had an EUS on 10/20/23 which revealed an ulcerating prepyloric mass (T3, possible N1, M0), biopsy positive for invasic adenocarcinoma. HER2 negative but dMMR (loss of MLH1 and PMS2). NGS and pharmacogenomics were sent. CT CAP on 10/31/23 revealed a circumferential pre-pyloric/pyloric thickening, thre are two areas of either extension of the primary mass or enarlged lymph nodes.     Interval History: Patient is doing fairly well at this time. Energy levels improving since he received transfusions. Denies any bleeding. He lives with his daughter in [place]. Is able to get around at home with use of a walker. Had a stroke about 10 years ago and has residual weakness on the left side. Denies any autoimmune conditions. He has  had a cough as well for about two weeks and had a sore throat a couple of days ago. No fever or chills. He is eating but has some abdominal discomfort and feeling of sour stomach when he is hungry. He takes Omeprazole for GERD that helps. Intermittently, he does have increased urgency to have a bowel movement but denies any significant incontinence. No other concerns or complaints today.     Patient presents with his son and daughter.  ECOG PS is 1.  History has been obtained by chart review and discussion with the patient.    ROS:   Review of Systems   Constitutional:  Negative for chills and fever.   HENT:  Positive for sore throat. Negative for congestion.    Eyes:  Negative for pain.   Respiratory:  Positive for cough. Negative for shortness of breath.    Cardiovascular:  Negative for chest pain, palpitations and leg swelling.   Gastrointestinal:  Positive for heartburn. Negative for abdominal pain, diarrhea, nausea and vomiting.   Genitourinary:  Negative for dysuria.   Musculoskeletal:  Negative for back pain.   Neurological:  Positive for weakness (left side). Negative for dizziness and headaches.   Psychiatric/Behavioral:  The patient is not nervous/anxious.        Past Medical History:   Past Medical History:   Diagnosis Date    Arthritis     In back, neck    Bell's palsy feb or march 2013    Blood clot in vein     right leg after back surgery    Cataract associated with other syndromes     GERD (gastroesophageal reflux disease)     Hypertension     Other and unspecified hyperlipidemia     Stroke     TIA (transient ischemic attack)     Feb or march 2013        Past Surgical History:   Past Surgical History:   Procedure Laterality Date    APPENDECTOMY      BACK SURGERY      COLONOSCOPY N/A 9/29/2023    Procedure: COLONOSCOPY;  Surgeon: Jan Lozano MD;  [facility];  Service: Gastroenterology;  Laterality: N/A;    ENDOSCOPIC ULTRASOUND OF UPPER GASTROINTESTINAL TRACT N/A  10/20/2023    Procedure: ULTRASOUND, UPPER GI TRACT, ENDOSCOPIC;  Surgeon: Reymundo Rangel MD;  Location: [facility] END[facility];  Service: Endoscopy;  Laterality: N/A;    ESOPHAGOGASTRODUODENOSCOPY N/A 2023    Procedure: EGD (ESOPHAGOGASTRODUODENOSCOPY);  Surgeon: Jan Lozano MD;  Location: [facility] END[facility];  Service: Gastroenterology;  Laterality: N/A;    ESOPHAGOGASTRODUODENOSCOPY N/A 10/20/2023    Procedure: EGD (ESOPHAGOGASTRODUODENOSCOPY);  Surgeon: Reymundo Rangel MD;  Location: [facility] END[facility];  Service: Endoscopy;  Laterality: N/A;  urgent EGD/EUS (radial) for gastric ulcerated mass (HGD)   Referring: Jan Lozano MD  10/17/23-Pt is no longer on Plavix, H/O CVA with left hemiparesis, instructions via portal-DS    PROSTATE SURGERY          Family History:   Family History   Problem Relation Age of Onset    Stroke Father     Hypertension Son         Social History:   Social History     Tobacco Use    Smoking status: Former     Current packs/day: 0.00     Types: Cigarettes     Quit date: 1955     Years since quittin.9    Smokeless tobacco: Not on file   Substance Use Topics    Alcohol use: No        I have reviewed and updated the patient's past medical, surgical, family and social histories.    Allergies:   Review of patient's allergies indicates:   Allergen Reactions    Prednisone         Medications:   Current Outpatient Medications   Medication Sig Dispense Refill    bumetanide (BUMEX) 1 MG tablet Take 1 mg by mouth once daily.      docusate sodium (COLACE) 100 MG capsule Take 1 capsule (100 mg total) by mouth 2 (two) times daily as needed (while taking pain medication). 20 capsule 0    hydroCHLOROthiazide (HYDRODIURIL) 25 MG tablet Take 25 mg by mouth once daily.      HYDROcodone-acetaminophen (NORCO) 5-325 mg per tablet Take 1 tablet by mouth every 6 (six) hours as needed for Pain. 11 tablet 0    irbesartan (AVAPRO) 300 MG tablet Take 300 mg by mouth once daily.       multivitamin-minerals-lutein (MULTIVITAMIN 50 PLUS) Tab Take 1 tablet by mouth once daily.      nebivolol (BYSTOLIC) 10 MG Tab Take 1 tablet (10 mg total) by mouth once daily. 30 tablet 11    omeprazole (PRILOSEC OTC) 20 MG tablet Take 2 tablets (40 mg total) by mouth once daily. 122 tablet 0    oxybutynin (DITROPAN-XL) 10 MG 24 hr tablet Take 10 mg by mouth once daily.      potassium chloride SA (KLOR-CON M20) 20 MEQ tablet Take 1 tablet (20 mEq total) by mouth once daily. 30 tablet 11    saw palmetto 500 MG capsule Take 500 mg by mouth once daily.      zinc gluconate 50 mg tablet Take 50 mg by mouth once daily.      atorvastatin (LIPITOR) 20 MG tablet Take 1 tablet (20 mg total) by mouth once daily. 30 tablet 1    clopidogrel (PLAVIX) 75 mg tablet Take 75 mg by mouth once daily.       No current facility-administered medications for this visit.        Physical Exam:   /60   Pulse 69   Temp 97.8 °F (36.6 °C) (Oral)   Resp 18   SpO2 99%                Physical Exam  Constitutional:       Appearance: Normal appearance.   HENT:      Head: Normocephalic and atraumatic.      Mouth/Throat:      Mouth: Mucous membranes are moist.      Pharynx: Oropharynx is clear.   Eyes:      Extraocular Movements: Extraocular movements intact.      Pupils: Pupils are equal, round, and reactive to light.   Cardiovascular:      Rate and Rhythm: Normal rate and regular rhythm.      Pulses: Normal pulses.      Heart sounds: Normal heart sounds.   Pulmonary:      Effort: Pulmonary effort is normal.      Breath sounds: Normal breath sounds.   Abdominal:      General: Abdomen is flat.      Palpations: Abdomen is soft.      Tenderness: There is no abdominal tenderness.   Musculoskeletal:         General: Normal range of motion.      Cervical back: Normal range of motion and neck supple.      Right lower leg: No edema.      Left lower leg: No edema.   Skin:     General: Skin is warm and dry.   Neurological:      General: No  focal deficit present.      Mental Status: He is alert and oriented to person, place, and time.      Motor: Weakness (left side slightly weaker than right) present.   Psychiatric:         Mood and Affect: Mood normal.         Behavior: Behavior normal.         Labs:   Recent Results (from the past 48 hour(s))   CBC Oncology    Collection Time: 11/20/23  8:34 AM   Result Value Ref Range    WBC 6.26 3.90 - 12.70 K/uL    RBC 2.54 (L) 4.60 - 6.20 M/uL    Hemoglobin 5.7 (LL) 14.0 - 18.0 g/dL    Hematocrit 18.9 (LL) 40.0 - 54.0 %    MCV 74 (L) 82 - 98 fL    MCH 22.4 (L) 27.0 - 31.0 pg    MCHC 30.2 (L) 32.0 - 36.0 g/dL    RDW 24.5 (H) 11.5 - 14.5 %    Platelets 329 150 - 450 K/uL    MPV 9.0 (L) 9.2 - 12.9 fL    Gran # (ANC) 3.5 1.8 - 7.7 K/uL    Immature Grans (Abs) 0.02 0.00 - 0.04 K/uL   Comprehensive Metabolic Panel    Collection Time: 11/20/23  8:34 AM   Result Value Ref Range    Sodium 137 136 - 145 mmol/L    Potassium 3.7 3.5 - 5.1 mmol/L    Chloride 101 95 - 110 mmol/L    CO2 26 23 - 29 mmol/L    Glucose 106 70 - 110 mg/dL    BUN 32 (H) 8 - 23 mg/dL    Creatinine 1.2 0.5 - 1.4 mg/dL    Calcium 9.3 8.7 - 10.5 mg/dL    Total Protein 6.6 6.0 - 8.4 g/dL    Albumin 3.0 (L) 3.5 - 5.2 g/dL    Total Bilirubin 0.2 0.1 - 1.0 mg/dL    Alkaline Phosphatase 73 55 - 135 U/L    AST 35 10 - 40 U/L    ALT 21 10 - 44 U/L    eGFR 57.4 (A) >60 mL/min/1.73 m^2    Anion Gap 10 8 - 16 mmol/L          Imaging:    10/31/23 CT CAP - Circumferential wall thickening at the pre-pyloric/pyloric region in this patient with known adenocarcinoma.  Area of nodular soft tissue excrescence versus two adjacent mildly enlarged nodes concerning for metastasis.    Path:   10/20/23 - Gastric biopsy with invasive adenocarcinoma. Loss of nuclear expression of MLH1 and PMS2.       Assessment:       1. Malignant neoplasm of pyloric antrum    2. Iron deficiency anemia due to chronic blood loss    3. Cerebrovascular accident (CVA), unspecified mechanism    4.  Neoplastic (malignant) related fatigue    5. Anemia in neoplastic disease            Plan:        # Malignant Neoplasm of Pyloric Antrum   Patient diagnosed with gastric adenocarcinoma on 10/20/23. No signs of metastasis on imaging. Patient is not a candidate for surgery or chemotherapy given his age and co-morbidities. His tumor was MSI-H (loss of nuclear expression of MLH1 and PMS2) so he is a candidate for immunotherapy. Spoke with patient and his family about the risks and benefits of immunotherapy and they are in agreement with starting it.     - Doing well today. Eating well and has a good appetite.   - Lab work reviewed. Hb 5.7. Will send to the ER for evaluation and blood transfusion. Report given to the ER at 10:06 am.   - will repeat lab work in the morning. If adequate and CBC improved, will proceed with cycle 1 tomorrow, 11/21.   - Keytruda 400mg q6w     - Proceed to the ER now    Patient was consented for chemotherapy today 11/20/2023 .   An extensive discussion was had which included a thorough discussion of the risk and benefits of treatment and alternatives.  Risks, including but not limited to, possible hair loss, bone marrow damage (anemia, thrombocytopenia, immune suppression, neutropenia), damage to body organs (brain, heart, liver, kidney, lungs, nervous system, skin, and others), allergic reactions, sterility, nausea/vomiting, constipation/diarrhea, sores in the mouth, secondary cancers, local damage at possible injection sites, and rarely death were all discussed.  The patient agrees with the plan, and all questions have been answered to their satisfaction.  Consent was signed the patient, provider, and a third party witness.      - RTC in 6 weeks for cycle 2.   - Will get repeat CT before 3rd dose of Keytruda     # JOSE JUAN, anemia in neoplastic disease, neoplastic related fatigue  - Secondary to blood loss from gastric mass. Hb 5.7 today. Patient is feeling fairly well aside from fatigue and  shortness of breath. Vital signs in clinic are stable.   - Will send to the ER for blood transfusion. Will repeat lab work prior to immunotherapy tomorrow and repeat weekly CBC  - proceed with first dose of Injectafer on 12/1  - Will schedule IV iron infusions  - will monitor TSH while receiving Pembro    # CVA  - some residual deficits but remains ambulatory with assistive devices at home.    Follow up: 6 weeks for cycle 2.     The above information has been reviewed with the patient and all questions have been answered to their apparent satisfaction.  They understand that they can call the clinic with any questions.    Case discussed with Dr. Steinberg.     CHARIS Padilla, PA-C  Physician Assistant Certified  Dept of Hematology/Oncology  PA-C to Dr. Ash, Dr. Steinberg and Dr. Hall       Route Chart for Scheduling    Med Onc Chart Routing  Urgent    Follow up with physician 6 weeks. See Dr. Steinberg in 6 weeks for cycle 2 of Pembro   Follow up with LANDON . See LANDON in 12 weeks for cycle 3 of Pembro   Infusion scheduling note   Please schedule weekly type and screens as well with pending blood transfusion   Injection scheduling note    Labs CBC, CMP, TSH and type and screen   Scheduling:  Preferred lab:  Lab interval: every 6 weeks  Repeat CBC and type and screen weekly x 4 weeks   Imaging    Pharmacy appointment    Other referrals              Treatment Plan Information   OP PEMBROLIZUMAB 400MG Q6W   Parish Steinberg MD   Upcoming Treatment Dates - OP PEMBROLIZUMAB 400MG Q6W    11/16/2023       Chemotherapy       pembrolizumab (KEYTRUDA) 400 mg in sodium chloride 0.9% SolP 116 mL infusion  12/28/2023       Chemotherapy       pembrolizumab (KEYTRUDA) 400 mg in sodium chloride 0.9% SolP 116 mL infusion  2/8/2024       Chemotherapy       pembrolizumab (KEYTRUDA) 400 mg in sodium chloride 0.9% SolP 116 mL infusion  3/21/2024       Chemotherapy       pembrolizumab (KEYTRUDA) 400 mg in sodium chloride 0.9% SolP 116 mL  infusion    Therapy Plan Information  Medications  ferric carboxymaltose (INJECTAFER) 750 mg in sodium chloride 0.9% 265 mL infusion  750 mg, Intravenous, 1 time a week  IV Fluids  0.9%  NaCl infusion  Intravenous, 1 time a week  Anaphylaxis/Hypersensitivity  EPINEPHrine (EPIPEN) 0.3 mg/0.3 mL pen injection 0.3 mg  0.3 mg, Intramuscular, PRN  diphenhydrAMINE injection 50 mg  50 mg, Intravenous, PRN  hydrocortisone sodium succinate injection 100 mg  100 mg, Intravenous, PRN  Flushes  sodium chloride 0.9% flush 10 mL  10 mL, Intravenous, 1 time a week  heparin, porcine (PF) 100 unit/mL injection flush 500 Units  500 Units, Intravenous, 1 time a week  sodium chloride 0.9% 100 mL flush bag  Intravenous, 1 time a week

## 2023-11-20 NOTE — ED TRIAGE NOTES
"Patient to ED from [facility] for low H/H this am of 5.7/18.9. reports last blood transfusion x1 month ago. Patient denies chest pain, shortness of breath, dizziness, or any other medical complaints. Patient states "I feel ok, I'm just cold". Family at bedside. Plan of care discussed with patient - verbalized understanding. Pending ED workup at this time.   "

## 2023-11-20 NOTE — ED PROVIDER NOTES
Encounter Date: 11/20/2023       History     Chief Complaint   Patient presents with    Abnormal Lab     Sent from [place] for blood transfusion     Patient is a 89-year-old male with recent diagnosis of stomach cancer who presents with anemia.  [facility] after he was found to have a hemoglobin of 5.7 this morning.  Patient reports fatigue and shortness of breath with exertion.  He also notes bilateral lower extremity swelling left greater than right.  He has had to have several transfusions already. He reports being cold but denies fever, black or bloody stools. He is supposed to start immunotherapy tomorrow.     The history is provided by the patient, medical records and a relative.     Review of patient's allergies indicates:   Allergen Reactions    Prednisone      Past Medical History:   Diagnosis Date    Arthritis     In back, neck    Bell's palsy feb or march 2013    Blood clot in vein     right leg after back surgery    Cataract associated with other syndromes     GERD (gastroesophageal reflux disease)     Hypertension     Other and unspecified hyperlipidemia     Stroke     TIA (transient ischemic attack)     Feb or march 2013     Past Surgical History:   Procedure Laterality Date    APPENDECTOMY      BACK SURGERY      COLONOSCOPY N/A 9/29/2023    Procedure: COLONOSCOPY;  Surgeon: Jan Lozano MD;  Location: [facility];  Service: Gastroenterology;  Laterality: N/A;    ENDOSCOPIC ULTRASOUND OF UPPER GASTROINTESTINAL TRACT N/A 10/20/2023    Procedure: ULTRASOUND, UPPER GI TRACT, ENDOSCOPIC;  Surgeon: Reymundo Rangel MD;  Location: [facility];  Service: Endoscopy;  Laterality: N/A;    ESOPHAGOGASTRODUODENOSCOPY N/A 9/29/2023    Procedure: EGD (ESOPHAGOGASTRODUODENOSCOPY);  Surgeon: Jan Lozano MD;  Location: [facility];  Service: Gastroenterology;  Laterality: N/A;    ESOPHAGOGASTRODUODENOSCOPY N/A 10/20/2023    Procedure: EGD (ESOPHAGOGASTRODUODENOSCOPY);   Surgeon: Reymundo Rangel MD;  Location: [facility];  Service: Endoscopy;  Laterality: N/A;  urgent EGD/EUS (radial) for gastric ulcerated mass (HGD)   Referring: Jan Lozano MD  10/17/23-Pt is no longer on Plavix, H/O CVA with left hemiparesis, instructions via portal-DS    PROSTATE SURGERY       Family History   Problem Relation Age of Onset    Stroke Father     Hypertension Son      Social History     Tobacco Use    Smoking status: Former     Current packs/day: 0.00     Types: Cigarettes     Quit date: 1955     Years since quittin.9   Substance Use Topics    Alcohol use: No         Physical Exam     Initial Vitals [23 1016]   BP Pulse Resp Temp SpO2   136/63 65 20 98.5 °F (36.9 °C) 98 %      MAP       --         Physical Exam    Nursing note and vitals reviewed.  Constitutional: He appears well-developed and well-nourished. He is not diaphoretic. No distress.   HENT:   Head: Normocephalic and atraumatic.   Eyes: EOM are normal.   Neck: Neck supple.   Normal range of motion.  Cardiovascular:  Normal rate and regular rhythm.           Pulmonary/Chest: No respiratory distress.   Abdominal: He exhibits no distension.   Musculoskeletal:         General: Tenderness (to BLE) and edema (to BLE L >R) present. Normal range of motion.      Cervical back: Normal range of motion and neck supple.     Neurological: He is alert and oriented to person, place, and time. GCS score is 15. GCS eye subscore is 4. GCS verbal subscore is 5. GCS motor subscore is 6.   Skin: Skin is warm and dry.   Psychiatric: He has a normal mood and affect. His behavior is normal. Judgment and thought content normal.         ED Course   Procedures  Labs Reviewed   CBC W/ AUTO DIFFERENTIAL - Abnormal; Notable for the following components:       Result Value    RBC 2.60 (*)     Hemoglobin 5.7 (*)     Hematocrit 19.5 (*)     MCV 75 (*)     MCH 21.9 (*)     MCHC 29.2 (*)     RDW 26.3 (*)     Mono # 1.1 (*)     Lymph % 17.8 (*)      Mono % 16.9 (*)     All other components within normal limits    Narrative:     Nicole Rinaldi RN acknowledged and accepted results on test(s) H&H   via secure chat.  by MAB3 11/20/2023 13:25   COMPREHENSIVE METABOLIC PANEL - Abnormal; Notable for the following components:    BUN 29 (*)     Albumin 3.0 (*)     AST 68 (*)     eGFR 57.4 (*)     All other components within normal limits   APTT   TSH    Narrative:     Add on TSH per Dr. Varghese @ 20:40 pm to order # [id number]   TYPE & SCREEN   PREPARE RBC SOFT   PREPARE RBC SOFT          Imaging Results              US Lower Extremity Veins Bilateral (Final result)  Result time 11/20/23 17:36:13      Final result by Mp Kate MD (11/20/23 17:36:13)                   Impression:      No evidence of deep venous thrombosis in either lower extremity.    Electronically signed by resident: Cori Casillas  Date:    11/20/2023  Time:    17:20    Electronically signed by: Mp Kate MD  Date:    11/20/2023  Time:    17:36               Narrative:    EXAMINATION:  US LOWER EXTREMITY VEINS BILATERAL    CLINICAL HISTORY:  Other specified soft tissue disorders    TECHNIQUE:  Duplex and color flow Doppler and dynamic compression was performed of the bilateral lower extremity veins was performed.    COMPARISON:  None    FINDINGS:  Right thigh veins: The common femoral, femoral, popliteal, and upper greater saphenous veins are patent and free of thrombus. The veins are normally compressible and have normal phasic flow and augmentation response.    Right calf veins: The visualized calf veins are patent.    Left thigh veins: The common femoral, femoral, popliteal, and upper greater saphenous veins are patent and free of thrombus. The veins are normally compressible and have normal phasic flow and augmentation response.    Left calf veins: The visualized calf veins are patent.                                       Medications - No data to display  Medical Decision Making  DDX:  acute blood loss anemia, GI bleeding, chemo side effect    Patient was given 2 units prbc's  He is HDS and well appearing  Would prefer discharge since he has close follow up tomorrow  Discussed with Genna mckenzie onc PA who saw patient in clinic today and I updated her on patient's condition  US of BLE neg for DVT  Patient discharged with return precautions    Amount and/or Complexity of Data Reviewed  Labs: ordered.               ED Course as of 11/21/23 1350   Mon Nov 20, 2023   1740 Updated patient, he still has blood transfusing [GK]      ED Course User Index  [GK] Shanell Varghese MD                        Clinical Impression:  Final diagnoses:  [M79.89] Left leg swelling  [D64.9] Anemia, unspecified type (Primary)          ED Disposition Condition    Discharge Stable          ED Prescriptions    None       Follow-up Information       Follow up With Specialties Details Why Contact Info    [facility] Dept Emergency Medicine  As needed, If symptoms worsen [address]  [facility] [address]  [phone]             Shanell Varghese MD  11/21/23 1358

## 2023-11-20 NOTE — ED NOTES
Multiple attempts made for PIV access with no success. Pending US guided PIV access at this time. Pending initiation of blood transfusion after blood work obtained.

## 2023-11-20 NOTE — ED NOTES
MD at bedside discussing blood transfusion consents. All questions answered - consents signed and at bedside.

## 2023-11-21 ENCOUNTER — DOCUMENTATION ONLY (OUTPATIENT)
Dept: HEMATOLOGY/ONCOLOGY | Facility: CLINIC | Age: 88
End: 2023-11-21
Payer: COMMERCIAL

## 2023-11-21 ENCOUNTER — INFUSION (OUTPATIENT)
Dept: INFUSION THERAPY | Facility: HOSPITAL | Age: 88
End: 2023-11-21
Payer: COMMERCIAL

## 2023-11-21 VITALS
RESPIRATION RATE: 18 BRPM | OXYGEN SATURATION: 100 % | WEIGHT: 209 LBS | SYSTOLIC BLOOD PRESSURE: 138 MMHG | TEMPERATURE: 98 F | HEIGHT: 68 IN | HEART RATE: 70 BPM | DIASTOLIC BLOOD PRESSURE: 72 MMHG | BODY MASS INDEX: 31.67 KG/M2

## 2023-11-21 DIAGNOSIS — C16.3 MALIGNANT NEOPLASM OF PYLORIC ANTRUM: ICD-10-CM

## 2023-11-21 DIAGNOSIS — D63.0 ANEMIA IN NEOPLASTIC DISEASE: ICD-10-CM

## 2023-11-21 DIAGNOSIS — D50.0 IRON DEFICIENCY ANEMIA DUE TO CHRONIC BLOOD LOSS: Primary | ICD-10-CM

## 2023-11-21 LAB
BASOPHILS # BLD AUTO: 0.02 K/UL (ref 0–0.2)
BASOPHILS # BLD AUTO: 0.02 K/UL (ref 0–0.2)
BASOPHILS NFR BLD: 0.3 % (ref 0–1.9)
BASOPHILS NFR BLD: 0.3 % (ref 0–1.9)
DIFFERENTIAL METHOD: ABNORMAL
DIFFERENTIAL METHOD: ABNORMAL
EOSINOPHIL # BLD AUTO: 0.3 K/UL (ref 0–0.5)
EOSINOPHIL # BLD AUTO: 0.3 K/UL (ref 0–0.5)
EOSINOPHIL NFR BLD: 4.3 % (ref 0–8)
EOSINOPHIL NFR BLD: 4.3 % (ref 0–8)
ERYTHROCYTE [DISTWIDTH] IN BLOOD BY AUTOMATED COUNT: 22.7 % (ref 11.5–14.5)
ERYTHROCYTE [DISTWIDTH] IN BLOOD BY AUTOMATED COUNT: 22.7 % (ref 11.5–14.5)
HCT VFR BLD AUTO: 26.9 % (ref 40–54)
HCT VFR BLD AUTO: 26.9 % (ref 40–54)
HGB BLD-MCNC: 8.3 G/DL (ref 14–18)
HGB BLD-MCNC: 8.3 G/DL (ref 14–18)
IMM GRANULOCYTES # BLD AUTO: 0.02 K/UL (ref 0–0.04)
IMM GRANULOCYTES # BLD AUTO: 0.02 K/UL (ref 0–0.04)
IMM GRANULOCYTES NFR BLD AUTO: 0.3 % (ref 0–0.5)
IMM GRANULOCYTES NFR BLD AUTO: 0.3 % (ref 0–0.5)
LYMPHOCYTES # BLD AUTO: 1.1 K/UL (ref 1–4.8)
LYMPHOCYTES # BLD AUTO: 1.1 K/UL (ref 1–4.8)
LYMPHOCYTES NFR BLD: 16.1 % (ref 18–48)
LYMPHOCYTES NFR BLD: 16.1 % (ref 18–48)
MCH RBC QN AUTO: 24.4 PG (ref 27–31)
MCH RBC QN AUTO: 24.4 PG (ref 27–31)
MCHC RBC AUTO-ENTMCNC: 30.9 G/DL (ref 32–36)
MCHC RBC AUTO-ENTMCNC: 30.9 G/DL (ref 32–36)
MCV RBC AUTO: 79 FL (ref 82–98)
MCV RBC AUTO: 79 FL (ref 82–98)
MONOCYTES # BLD AUTO: 1.1 K/UL (ref 0.3–1)
MONOCYTES # BLD AUTO: 1.1 K/UL (ref 0.3–1)
MONOCYTES NFR BLD: 16.4 % (ref 4–15)
MONOCYTES NFR BLD: 16.4 % (ref 4–15)
NEUTROPHILS # BLD AUTO: 4.3 K/UL (ref 1.8–7.7)
NEUTROPHILS # BLD AUTO: 4.3 K/UL (ref 1.8–7.7)
NEUTROPHILS NFR BLD: 62.6 % (ref 38–73)
NEUTROPHILS NFR BLD: 62.6 % (ref 38–73)
NRBC BLD-RTO: 0 /100 WBC
NRBC BLD-RTO: 0 /100 WBC
PLATELET # BLD AUTO: 321 K/UL (ref 150–450)
PLATELET # BLD AUTO: 321 K/UL (ref 150–450)
PMV BLD AUTO: 9.5 FL (ref 9.2–12.9)
PMV BLD AUTO: 9.5 FL (ref 9.2–12.9)
RBC # BLD AUTO: 3.4 M/UL (ref 4.6–6.2)
RBC # BLD AUTO: 3.4 M/UL (ref 4.6–6.2)
WBC # BLD AUTO: 6.78 K/UL (ref 3.9–12.7)
WBC # BLD AUTO: 6.78 K/UL (ref 3.9–12.7)

## 2023-11-21 PROCEDURE — 25000003 PHARM REV CODE 250: Performed by: INTERNAL MEDICINE

## 2023-11-21 PROCEDURE — 85025 COMPLETE CBC W/AUTO DIFF WBC: CPT | Performed by: PHYSICIAN ASSISTANT

## 2023-11-21 PROCEDURE — 63600175 PHARM REV CODE 636 W HCPCS: Mod: JZ,JG | Performed by: INTERNAL MEDICINE

## 2023-11-21 PROCEDURE — 96413 CHEMO IV INFUSION 1 HR: CPT

## 2023-11-21 PROCEDURE — 96367 TX/PROPH/DG ADDL SEQ IV INF: CPT

## 2023-11-21 RX ORDER — PROCHLORPERAZINE EDISYLATE 5 MG/ML
5 INJECTION INTRAMUSCULAR; INTRAVENOUS ONCE AS NEEDED
Status: CANCELLED
Start: 2023-11-21

## 2023-11-21 RX ORDER — HEPARIN 100 UNIT/ML
5 SYRINGE INTRAVENOUS
Status: DISCONTINUED | OUTPATIENT
Start: 2023-11-21 | End: 2023-11-21 | Stop reason: HOSPADM

## 2023-11-21 RX ORDER — EPINEPHRINE 0.3 MG/.3ML
0.3 INJECTION SUBCUTANEOUS ONCE AS NEEDED
Status: DISCONTINUED | OUTPATIENT
Start: 2023-11-21 | End: 2023-11-21 | Stop reason: HOSPADM

## 2023-11-21 RX ORDER — HEPARIN 100 UNIT/ML
500 SYRINGE INTRAVENOUS
Status: DISCONTINUED | OUTPATIENT
Start: 2023-11-21 | End: 2023-11-21 | Stop reason: HOSPADM

## 2023-11-21 RX ORDER — SODIUM CHLORIDE 0.9 % (FLUSH) 0.9 %
10 SYRINGE (ML) INJECTION
Status: CANCELLED | OUTPATIENT
Start: 2023-11-21

## 2023-11-21 RX ORDER — SODIUM CHLORIDE 0.9 % (FLUSH) 0.9 %
10 SYRINGE (ML) INJECTION
Status: DISCONTINUED | OUTPATIENT
Start: 2023-11-21 | End: 2023-11-21 | Stop reason: HOSPADM

## 2023-11-21 RX ORDER — DIPHENHYDRAMINE HYDROCHLORIDE 50 MG/ML
50 INJECTION INTRAMUSCULAR; INTRAVENOUS ONCE AS NEEDED
Status: CANCELLED | OUTPATIENT
Start: 2023-11-28

## 2023-11-21 RX ORDER — SODIUM CHLORIDE 9 MG/ML
INJECTION, SOLUTION INTRAVENOUS CONTINUOUS
Status: DISCONTINUED | OUTPATIENT
Start: 2023-11-21 | End: 2023-11-21 | Stop reason: HOSPADM

## 2023-11-21 RX ORDER — HEPARIN 100 UNIT/ML
5 SYRINGE INTRAVENOUS
Status: CANCELLED | OUTPATIENT
Start: 2023-11-28

## 2023-11-21 RX ORDER — HEPARIN 100 UNIT/ML
500 SYRINGE INTRAVENOUS
Status: CANCELLED | OUTPATIENT
Start: 2023-11-21

## 2023-11-21 RX ORDER — SODIUM CHLORIDE 9 MG/ML
INJECTION, SOLUTION INTRAVENOUS CONTINUOUS
Status: CANCELLED | OUTPATIENT
Start: 2023-11-28

## 2023-11-21 RX ORDER — EPINEPHRINE 0.3 MG/.3ML
0.3 INJECTION SUBCUTANEOUS ONCE AS NEEDED
Status: CANCELLED | OUTPATIENT
Start: 2023-11-28

## 2023-11-21 RX ORDER — PROCHLORPERAZINE EDISYLATE 5 MG/ML
5 INJECTION INTRAMUSCULAR; INTRAVENOUS ONCE AS NEEDED
Status: DISCONTINUED | OUTPATIENT
Start: 2023-11-21 | End: 2023-11-21 | Stop reason: HOSPADM

## 2023-11-21 RX ORDER — EPINEPHRINE 0.3 MG/.3ML
0.3 INJECTION SUBCUTANEOUS ONCE AS NEEDED
Status: CANCELLED | OUTPATIENT
Start: 2023-11-21

## 2023-11-21 RX ORDER — DIPHENHYDRAMINE HYDROCHLORIDE 50 MG/ML
50 INJECTION INTRAMUSCULAR; INTRAVENOUS ONCE AS NEEDED
Status: DISCONTINUED | OUTPATIENT
Start: 2023-11-21 | End: 2023-11-21 | Stop reason: HOSPADM

## 2023-11-21 RX ORDER — SODIUM CHLORIDE 0.9 % (FLUSH) 0.9 %
10 SYRINGE (ML) INJECTION
Status: CANCELLED | OUTPATIENT
Start: 2023-11-28

## 2023-11-21 RX ORDER — DIPHENHYDRAMINE HYDROCHLORIDE 50 MG/ML
50 INJECTION INTRAMUSCULAR; INTRAVENOUS ONCE AS NEEDED
Status: CANCELLED | OUTPATIENT
Start: 2023-11-21

## 2023-11-21 RX ADMIN — SODIUM CHLORIDE: 9 INJECTION, SOLUTION INTRAVENOUS at 10:11

## 2023-11-21 RX ADMIN — FERRIC CARBOXYMALTOSE INJECTION 750 MG: 50 INJECTION, SOLUTION INTRAVENOUS at 10:11

## 2023-11-21 RX ADMIN — SODIUM CHLORIDE 400 MG: 9 INJECTION, SOLUTION INTRAVENOUS at 01:11

## 2023-11-21 NOTE — PLAN OF CARE
Pt admitted for C1 Keytruda and #1 Injectafer. Original CBC specimen clotted so 2nd lab drawn, Pt is a very difficult PIV start and venous stick, Radha Harding RN had to come and draw labs and start IV. Pt tolerated treatment well. Side effects and self care tips discussed with Pt and his daughter. AVS given to Pt and Pt discharged home in W/C with daughter.

## 2023-11-21 NOTE — DISCHARGE INSTRUCTIONS
I recommend close follow up with your doctor closely.  Keep the appointments you have tomorrow.    You were given 2 units of blood.     The ultrasound of your legs was negative for blood clots.    Seek immediate medical attention if you have new or worsening symptoms, or for any other concern.

## 2023-11-24 DIAGNOSIS — D50.0 IRON DEFICIENCY ANEMIA DUE TO CHRONIC BLOOD LOSS: Primary | ICD-10-CM

## 2023-12-01 ENCOUNTER — INFUSION (OUTPATIENT)
Dept: INFUSION THERAPY | Facility: HOSPITAL | Age: 88
End: 2023-12-01
Payer: COMMERCIAL

## 2023-12-01 VITALS
RESPIRATION RATE: 18 BRPM | HEIGHT: 68 IN | HEART RATE: 68 BPM | TEMPERATURE: 98 F | SYSTOLIC BLOOD PRESSURE: 135 MMHG | DIASTOLIC BLOOD PRESSURE: 63 MMHG | BODY MASS INDEX: 31.67 KG/M2 | WEIGHT: 209 LBS

## 2023-12-01 DIAGNOSIS — D50.0 IRON DEFICIENCY ANEMIA DUE TO CHRONIC BLOOD LOSS: Primary | ICD-10-CM

## 2023-12-01 PROCEDURE — 96365 THER/PROPH/DIAG IV INF INIT: CPT

## 2023-12-01 PROCEDURE — 63600175 PHARM REV CODE 636 W HCPCS: Mod: JZ,JG | Performed by: INTERNAL MEDICINE

## 2023-12-01 PROCEDURE — 25000003 PHARM REV CODE 250: Performed by: INTERNAL MEDICINE

## 2023-12-01 RX ORDER — HEPARIN 100 UNIT/ML
5 SYRINGE INTRAVENOUS
Status: CANCELLED | OUTPATIENT
Start: 2023-12-01

## 2023-12-01 RX ORDER — SODIUM CHLORIDE 0.9 % (FLUSH) 0.9 %
10 SYRINGE (ML) INJECTION
Status: CANCELLED | OUTPATIENT
Start: 2023-12-01

## 2023-12-01 RX ORDER — EPINEPHRINE 0.3 MG/.3ML
0.3 INJECTION SUBCUTANEOUS ONCE AS NEEDED
Status: CANCELLED | OUTPATIENT
Start: 2023-12-01

## 2023-12-01 RX ORDER — HEPARIN 100 UNIT/ML
5 SYRINGE INTRAVENOUS
Status: DISCONTINUED | OUTPATIENT
Start: 2023-12-01 | End: 2023-12-01 | Stop reason: HOSPADM

## 2023-12-01 RX ORDER — SODIUM CHLORIDE 9 MG/ML
INJECTION, SOLUTION INTRAVENOUS CONTINUOUS
Status: DISCONTINUED | OUTPATIENT
Start: 2023-12-01 | End: 2023-12-01 | Stop reason: HOSPADM

## 2023-12-01 RX ORDER — DIPHENHYDRAMINE HYDROCHLORIDE 50 MG/ML
50 INJECTION INTRAMUSCULAR; INTRAVENOUS ONCE AS NEEDED
Status: CANCELLED | OUTPATIENT
Start: 2023-12-01

## 2023-12-01 RX ORDER — SODIUM CHLORIDE 0.9 % (FLUSH) 0.9 %
10 SYRINGE (ML) INJECTION
Status: DISCONTINUED | OUTPATIENT
Start: 2023-12-01 | End: 2023-12-01 | Stop reason: HOSPADM

## 2023-12-01 RX ORDER — SODIUM CHLORIDE 9 MG/ML
INJECTION, SOLUTION INTRAVENOUS CONTINUOUS
Status: CANCELLED | OUTPATIENT
Start: 2023-12-01

## 2023-12-01 RX ADMIN — FERRIC CARBOXYMALTOSE INJECTION 750 MG: 50 INJECTION, SOLUTION INTRAVENOUS at 12:12

## 2023-12-01 RX ADMIN — SODIUM CHLORIDE: 9 INJECTION, SOLUTION INTRAVENOUS at 11:12

## 2023-12-01 NOTE — PLAN OF CARE
1110 pt here for tx. Pts chart reviewed allergies, meds, tx, hx. Pt reclined in chair. Son with pt during tx.

## 2023-12-20 LAB
DNA RANGE(S) EXAMINED NAR: NORMAL
GENE DIS ANL INTERP-IMP: POSITIVE
GENE DIS ASSESSED: NORMAL
GENE MUT TESTED BLD/T: 72.6 M/MB
MSI CA SPEC-IMP: NORMAL
REASON FOR STUDY: NORMAL
TEMPUS AMENDMENTNOTE1: NORMAL
TEMPUS FUSIONADDENDUM: NORMAL
TEMPUS LCA: NORMAL
TEMPUS PD-L1 (22C3) COMBINED POSITIVE SCORE: 5
TEMPUS PD-L1 (22C3) TUMOR PROPORTION SCORE: 2 %
TEMPUS PORTAL: NORMAL
TEMPUS THERAPY1: NORMAL
TEMPUS THERAPY2: NORMAL
TEMPUS THERAPYCOUNT: 2
TEMPUS TRIAL1: NORMAL
TEMPUS TRIAL2: NORMAL
TEMPUS TRIAL3: NORMAL
TEMPUS TRIALCOUNT: 3

## 2024-01-02 ENCOUNTER — TELEPHONE (OUTPATIENT)
Dept: HEMATOLOGY/ONCOLOGY | Facility: CLINIC | Age: 89
End: 2024-01-02
Payer: COMMERCIAL

## 2024-01-02 NOTE — TELEPHONE ENCOUNTER
Destiney Stevens, RN  Shanna Sherrill    Good morning, Sherrill.  I spoke with daughter and she confirmed patient not feeling well (coughing and congested for couple of days now and improving). Dtr trying to get him checked by PCP.    Would like to reschedule labs-Dr Manley.  If able to get it next week or after, will have to adjust the 2/14 apptmnts.    Thanks,  Destiney        ----- Message from Nicolette Andrade sent at 1/2/2024  7:53 AM CST -----  Type:  Patient Returning Call    Who Called:pt   Who Left Message for Patient:  Does the patient know what this is regarding?:appt cancellation   Would the patient rather a call back or a response via [facility]? Call   Best Call Back Number:[phone]  Additional Information: states that pt is sick and needs to reschedule all appts for today

## 2024-01-11 ENCOUNTER — LAB VISIT (OUTPATIENT)
Dept: LAB | Facility: HOSPITAL | Age: 89
End: 2024-01-11
Attending: PHYSICIAN ASSISTANT
Payer: COMMERCIAL

## 2024-01-11 ENCOUNTER — OFFICE VISIT (OUTPATIENT)
Dept: HEMATOLOGY/ONCOLOGY | Facility: CLINIC | Age: 89
End: 2024-01-11
Payer: COMMERCIAL

## 2024-01-11 VITALS
RESPIRATION RATE: 18 BRPM | HEIGHT: 68 IN | HEART RATE: 68 BPM | SYSTOLIC BLOOD PRESSURE: 145 MMHG | WEIGHT: 202.94 LBS | BODY MASS INDEX: 30.76 KG/M2 | OXYGEN SATURATION: 97 % | DIASTOLIC BLOOD PRESSURE: 65 MMHG

## 2024-01-11 DIAGNOSIS — G25.81 RESTLESS LEG SYNDROME DUE TO IRON DEFICIENCY ANEMIA: ICD-10-CM

## 2024-01-11 DIAGNOSIS — R53.0 NEOPLASTIC (MALIGNANT) RELATED FATIGUE: ICD-10-CM

## 2024-01-11 DIAGNOSIS — D63.0 ANEMIA IN NEOPLASTIC DISEASE: ICD-10-CM

## 2024-01-11 DIAGNOSIS — C16.3 MALIGNANT NEOPLASM OF PYLORIC ANTRUM: ICD-10-CM

## 2024-01-11 DIAGNOSIS — D50.9 RESTLESS LEG SYNDROME DUE TO IRON DEFICIENCY ANEMIA: ICD-10-CM

## 2024-01-11 DIAGNOSIS — C16.3 MALIGNANT NEOPLASM OF PYLORIC ANTRUM: Primary | ICD-10-CM

## 2024-01-11 DIAGNOSIS — D50.0 IRON DEFICIENCY ANEMIA DUE TO CHRONIC BLOOD LOSS: ICD-10-CM

## 2024-01-11 LAB
ABO + RH BLD: NORMAL
ALBUMIN SERPL BCP-MCNC: 3.4 G/DL (ref 3.5–5.2)
ALP SERPL-CCNC: 73 U/L (ref 55–135)
ALT SERPL W/O P-5'-P-CCNC: 47 U/L (ref 10–44)
ANION GAP SERPL CALC-SCNC: 12 MMOL/L (ref 8–16)
AST SERPL-CCNC: 53 U/L (ref 10–40)
BASOPHILS # BLD AUTO: 0.03 K/UL (ref 0–0.2)
BASOPHILS NFR BLD: 0.5 % (ref 0–1.9)
BILIRUB SERPL-MCNC: 0.2 MG/DL (ref 0.1–1)
BLD GP AB SCN CELLS X3 SERPL QL: NORMAL
BUN SERPL-MCNC: 27 MG/DL (ref 8–23)
CALCIUM SERPL-MCNC: 9.3 MG/DL (ref 8.7–10.5)
CHLORIDE SERPL-SCNC: 102 MMOL/L (ref 95–110)
CO2 SERPL-SCNC: 27 MMOL/L (ref 23–29)
CREAT SERPL-MCNC: 1.2 MG/DL (ref 0.5–1.4)
DIFFERENTIAL METHOD BLD: ABNORMAL
EOSINOPHIL # BLD AUTO: 0.2 K/UL (ref 0–0.5)
EOSINOPHIL NFR BLD: 3.1 % (ref 0–8)
ERYTHROCYTE [DISTWIDTH] IN BLOOD BY AUTOMATED COUNT: 22.6 % (ref 11.5–14.5)
EST. GFR  (NO RACE VARIABLE): 57.4 ML/MIN/1.73 M^2
GLUCOSE SERPL-MCNC: 98 MG/DL (ref 70–110)
HCT VFR BLD AUTO: 33.7 % (ref 40–54)
HGB BLD-MCNC: 10.7 G/DL (ref 14–18)
IMM GRANULOCYTES # BLD AUTO: 0.03 K/UL (ref 0–0.04)
IMM GRANULOCYTES NFR BLD AUTO: 0.5 % (ref 0–0.5)
LYMPHOCYTES # BLD AUTO: 1.1 K/UL (ref 1–4.8)
LYMPHOCYTES NFR BLD: 16.9 % (ref 18–48)
MCH RBC QN AUTO: 27.5 PG (ref 27–31)
MCHC RBC AUTO-ENTMCNC: 31.8 G/DL (ref 32–36)
MCV RBC AUTO: 87 FL (ref 82–98)
MONOCYTES # BLD AUTO: 0.9 K/UL (ref 0.3–1)
MONOCYTES NFR BLD: 13.2 % (ref 4–15)
NEUTROPHILS # BLD AUTO: 4.3 K/UL (ref 1.8–7.7)
NEUTROPHILS NFR BLD: 65.8 % (ref 38–73)
NRBC BLD-RTO: 0 /100 WBC
PLATELET # BLD AUTO: 232 K/UL (ref 150–450)
PMV BLD AUTO: 10.2 FL (ref 9.2–12.9)
POTASSIUM SERPL-SCNC: 3.6 MMOL/L (ref 3.5–5.1)
PROT SERPL-MCNC: 7.4 G/DL (ref 6–8.4)
RBC # BLD AUTO: 3.89 M/UL (ref 4.6–6.2)
SODIUM SERPL-SCNC: 141 MMOL/L (ref 136–145)
SPECIMEN OUTDATE: NORMAL
TSH SERPL DL<=0.005 MIU/L-ACNC: 2.59 UIU/ML (ref 0.4–4)
WBC # BLD AUTO: 6.51 K/UL (ref 3.9–12.7)

## 2024-01-11 PROCEDURE — 1160F RVW MEDS BY RX/DR IN RCRD: CPT | Mod: CPTII,S$GLB,, | Performed by: PHYSICIAN ASSISTANT

## 2024-01-11 PROCEDURE — 84443 ASSAY THYROID STIM HORMONE: CPT | Performed by: PHYSICIAN ASSISTANT

## 2024-01-11 PROCEDURE — 99215 OFFICE O/P EST HI 40 MIN: CPT | Mod: S$GLB,,, | Performed by: PHYSICIAN ASSISTANT

## 2024-01-11 PROCEDURE — 85025 COMPLETE CBC W/AUTO DIFF WBC: CPT | Performed by: PHYSICIAN ASSISTANT

## 2024-01-11 PROCEDURE — 1125F AMNT PAIN NOTED PAIN PRSNT: CPT | Mod: CPTII,S$GLB,, | Performed by: PHYSICIAN ASSISTANT

## 2024-01-11 PROCEDURE — 80053 COMPREHEN METABOLIC PANEL: CPT | Performed by: PHYSICIAN ASSISTANT

## 2024-01-11 PROCEDURE — 86901 BLOOD TYPING SEROLOGIC RH(D): CPT | Performed by: PHYSICIAN ASSISTANT

## 2024-01-11 PROCEDURE — 1101F PT FALLS ASSESS-DOCD LE1/YR: CPT | Mod: CPTII,S$GLB,, | Performed by: PHYSICIAN ASSISTANT

## 2024-01-11 PROCEDURE — 1159F MED LIST DOCD IN RCRD: CPT | Mod: CPTII,S$GLB,, | Performed by: PHYSICIAN ASSISTANT

## 2024-01-11 PROCEDURE — 3288F FALL RISK ASSESSMENT DOCD: CPT | Mod: CPTII,S$GLB,, | Performed by: PHYSICIAN ASSISTANT

## 2024-01-11 PROCEDURE — 99999 PR PBB SHADOW E&M-EST. PATIENT-LVL IV: CPT | Mod: PBBFAC,,, | Performed by: PHYSICIAN ASSISTANT

## 2024-01-11 PROCEDURE — 36415 COLL VENOUS BLD VENIPUNCTURE: CPT | Performed by: PHYSICIAN ASSISTANT

## 2024-01-11 RX ORDER — EPINEPHRINE 0.3 MG/.3ML
0.3 INJECTION SUBCUTANEOUS ONCE AS NEEDED
Status: CANCELLED | OUTPATIENT
Start: 2024-01-11

## 2024-01-11 RX ORDER — PROCHLORPERAZINE EDISYLATE 5 MG/ML
5 INJECTION INTRAMUSCULAR; INTRAVENOUS ONCE AS NEEDED
Status: CANCELLED
Start: 2024-01-11

## 2024-01-11 RX ORDER — DIPHENHYDRAMINE HYDROCHLORIDE 50 MG/ML
50 INJECTION, SOLUTION INTRAMUSCULAR; INTRAVENOUS ONCE AS NEEDED
Status: CANCELLED | OUTPATIENT
Start: 2024-01-11

## 2024-01-11 RX ORDER — PROMETHAZINE HYDROCHLORIDE AND DEXTROMETHORPHAN HYDROBROMIDE 6.25; 15 MG/5ML; MG/5ML
5 SYRUP ORAL EVERY 6 HOURS PRN
COMMUNITY
Start: 2024-01-04

## 2024-01-11 RX ORDER — SODIUM CHLORIDE 0.9 % (FLUSH) 0.9 %
10 SYRINGE (ML) INJECTION
Status: CANCELLED | OUTPATIENT
Start: 2024-01-11

## 2024-01-11 RX ORDER — AMOXICILLIN 500 MG/1
500 CAPSULE ORAL 2 TIMES DAILY
COMMUNITY
Start: 2024-01-04

## 2024-01-11 RX ORDER — IBUPROFEN 800 MG/1
800 TABLET ORAL EVERY 8 HOURS PRN
Qty: 90 TABLET | Refills: 2 | Status: SHIPPED | OUTPATIENT
Start: 2024-01-11 | End: 2025-01-10

## 2024-01-11 RX ORDER — HEPARIN 100 UNIT/ML
500 SYRINGE INTRAVENOUS
Status: CANCELLED | OUTPATIENT
Start: 2024-01-11

## 2024-01-11 NOTE — PROGRESS NOTES
MEDICAL ONCOLOGY - NEW PATIENT VISIT    Reason for visit: Gastric Adenocarcinoma     Best Contact Phone Number(s): [phone] (home)      Cancer/Stage/TNM:    Cancer Staging   Malignant neoplasm of pyloric antrum  Staging form: Stomach, AJCC 8th Edition  - Clinical stage from 10/20/2023: Stage III (cT3, cN1, cM0) - Signed by Parish Steinberg MD on 11/3/2023       Oncology History   Malignant neoplasm of pyloric antrum   10/20/2023 Cancer Staged    Staging form: Stomach, AJCC 8th Edition  - Clinical stage from 10/20/2023: Stage III (cT3, cN1, cM0)     11/2/2023 Initial Diagnosis    Malignant neoplasm of pyloric antrum     11/21/2023 -  Chemotherapy    Treatment Summary   Plan Name: OP PEMBROLIZUMAB 400MG Q6W  Treatment Goal: Palliative  Status: Active  Start Date: 11/21/2023  End Date: 9/18/2025 (Planned)  Provider: Parish Steinberg MD  Chemotherapy: [No matching medication found in this treatment plan]          HPI:   89 y.o. male presents for evaluation of newly diagnosed gastric adenocarcinoma. He presented to the ED in September 2023 with weakness and was found to have a Hg of 4.2. He underwent and EGD which revealed an ulcerative mass, biopsies revealed high-grade dysplasia. Had an EUS on 10/20/23 which revealed an ulcerating prepyloric mass (T3, possible N1, M0), biopsy positive for invasic adenocarcinoma. HER2 negative but dMMR (loss of MLH1 and PMS2). NGS and pharmacogenomics were sent. CT CAP on 10/31/23 revealed a circumferential pre-pyloric/pyloric thickening, thre are two areas of either extension of the primary mass or enarlged lymph nodes.     Interval History: Mr Chin returns to the clinic prior to his next cycle of immunotherapy with Pembro. He is doing fairly. He continues to have discomfort within the lower extremities. He is taking motrin that does help some. He is eating and has a fair appetite.     Patient presents with his family.    ECOG PS is 1. He was placed on antibiotics,  Amoxicillin on 1/4/2024.   History has been obtained by chart review and discussion with the patient.    ROS:   Review of Systems   Constitutional:  Negative for chills and fever.   HENT:  Positive for sore throat. Negative for congestion.    Eyes:  Negative for pain.   Respiratory:  Positive for cough. Negative for shortness of breath.    Cardiovascular:  Negative for chest pain, palpitations and leg swelling.   Gastrointestinal:  Positive for heartburn. Negative for abdominal pain, diarrhea, nausea and vomiting.   Genitourinary:  Negative for dysuria.   Musculoskeletal:  Negative for back pain.   Neurological:  Positive for weakness (left side). Negative for dizziness and headaches.   Psychiatric/Behavioral:  The patient is not nervous/anxious.        Past Medical History:   Past Medical History:   Diagnosis Date    Arthritis     In back, neck    Bell's palsy feb or march 2013    Blood clot in vein     right leg after back surgery    Cataract associated with other syndromes     GERD (gastroesophageal reflux disease)     Hypertension     Other and unspecified hyperlipidemia     Stroke     TIA (transient ischemic attack)     Feb or march 2013        Past Surgical History:   Past Surgical History:   Procedure Laterality Date    APPENDECTOMY      BACK SURGERY      COLONOSCOPY N/A 9/29/2023    Procedure: COLONOSCOPY;  Surgeon: Jan Lozano MD;  Location: [facility];  Service: Gastroenterology;  Laterality: N/A;    ENDOSCOPIC ULTRASOUND OF UPPER GASTROINTESTINAL TRACT N/A 10/20/2023    Procedure: ULTRASOUND, UPPER GI TRACT, ENDOSCOPIC;  Surgeon: Reymundo Rangel MD;  Location: [facility];  Service: Endoscopy;  Laterality: N/A;    ESOPHAGOGASTRODUODENOSCOPY N/A 9/29/2023    Procedure: EGD (ESOPHAGOGASTRODUODENOSCOPY);  Surgeon: Jan Lozano MD;  Location: [facility];  Service: Gastroenterology;  Laterality: N/A;    ESOPHAGOGASTRODUODENOSCOPY N/A 10/20/2023    Procedure: EGD  (ESOPHAGOGASTRODUODENOSCOPY);  Surgeon: Reymundo Rangel MD;  Location: [facility] ENDO;  Service: Endoscopy;  Laterality: N/A;  urgent EGD/EUS (radial) for gastric ulcerated mass (HGD)   Referring: Jan Lozano MD  10/17/23-Pt is no longer on Plavix, H/O CVA with left hemiparesis, instructions via portal-DS    PROSTATE SURGERY          Family History:   Family History   Problem Relation Age of Onset    Stroke Father     Hypertension Son         Social History:   Social History     Tobacco Use    Smoking status: Former     Current packs/day: 0.00     Types: Cigarettes     Quit date: 1955     Years since quittin.0    Smokeless tobacco: Not on file   Substance Use Topics    Alcohol use: No        I have reviewed and updated the patient's past medical, surgical, family and social histories.    Allergies:   Review of patient's allergies indicates:   Allergen Reactions    Prednisone         Medications:   Current Outpatient Medications   Medication Sig Dispense Refill    amoxicillin (AMOXIL) 500 MG capsule Take 500 mg by mouth 2 (two) times daily.      promethazine-dextromethorphan (PROMETHAZINE-DM) 6.25-15 mg/5 mL Syrp Take 5 mLs by mouth every 6 (six) hours as needed.      atorvastatin (LIPITOR) 20 MG tablet Take 1 tablet (20 mg total) by mouth once daily. 30 tablet 1    bumetanide (BUMEX) 1 MG tablet Take 1 mg by mouth once daily.      clopidogrel (PLAVIX) 75 mg tablet Take 75 mg by mouth once daily.      docusate sodium (COLACE) 100 MG capsule Take 1 capsule (100 mg total) by mouth 2 (two) times daily as needed (while taking pain medication). 20 capsule 0    hydroCHLOROthiazide (HYDRODIURIL) 25 MG tablet Take 25 mg by mouth once daily.      HYDROcodone-acetaminophen (NORCO) 5-325 mg per tablet Take 1 tablet by mouth every 6 (six) hours as needed for Pain. 11 tablet 0    ibuprofen (ADVIL,MOTRIN) 800 MG tablet Take 1 tablet (800 mg total) by mouth every 8 (eight) hours as needed for Pain. 90 tablet 2     "irbesartan (AVAPRO) 300 MG tablet Take 300 mg by mouth once daily.      multivitamin-minerals-lutein (MULTIVITAMIN 50 PLUS) Tab Take 1 tablet by mouth once daily.      nebivolol (BYSTOLIC) 10 MG Tab Take 1 tablet (10 mg total) by mouth once daily. 30 tablet 11    omeprazole (PRILOSEC OTC) 20 MG tablet Take 2 tablets (40 mg total) by mouth once daily. 122 tablet 0    oxybutynin (DITROPAN-XL) 10 MG 24 hr tablet Take 10 mg by mouth once daily.      potassium chloride SA (KLOR-CON M20) 20 MEQ tablet Take 1 tablet (20 mEq total) by mouth once daily. 30 tablet 11    saw palmetto 500 MG capsule Take 500 mg by mouth once daily.      zinc gluconate 50 mg tablet Take 50 mg by mouth once daily.       No current facility-administered medications for this visit.        Physical Exam:   BP (!) 145/65 (BP Location: Left arm, Patient Position: Sitting, BP Method: Large (Automatic))   Pulse 68   Resp 18   Ht 5' 8" (1.727 m)   Wt 92 kg (202 lb 14.9 oz)   SpO2 97%   BMI 30.86 kg/m²                Physical Exam  Constitutional:       Appearance: Normal appearance.   HENT:      Head: Normocephalic and atraumatic.      Mouth/Throat:      Mouth: Mucous membranes are moist.      Pharynx: Oropharynx is clear.   Eyes:      Extraocular Movements: Extraocular movements intact.      Pupils: Pupils are equal, round, and reactive to light.   Cardiovascular:      Rate and Rhythm: Normal rate and regular rhythm.      Pulses: Normal pulses.      Heart sounds: Normal heart sounds.   Pulmonary:      Effort: Pulmonary effort is normal.      Breath sounds: Normal breath sounds.   Abdominal:      General: Abdomen is flat.      Palpations: Abdomen is soft.      Tenderness: There is no abdominal tenderness.   Musculoskeletal:         General: Normal range of motion.      Cervical back: Normal range of motion and neck supple.      Right lower leg: No edema.      Left lower leg: No edema.   Skin:     General: Skin is warm and dry.   Neurological:      " General: No focal deficit present.      Mental Status: He is alert and oriented to person, place, and time.      Motor: Weakness (left side slightly weaker than right) present.   Psychiatric:         Mood and Affect: Mood normal.         Behavior: Behavior normal.           Labs:   Recent Results (from the past 48 hour(s))   Comprehensive Metabolic Panel    Collection Time: 01/11/24  2:30 PM   Result Value Ref Range    Sodium 141 136 - 145 mmol/L    Potassium 3.6 3.5 - 5.1 mmol/L    Chloride 102 95 - 110 mmol/L    CO2 27 23 - 29 mmol/L    Glucose 98 70 - 110 mg/dL    BUN 27 (H) 8 - 23 mg/dL    Creatinine 1.2 0.5 - 1.4 mg/dL    Calcium 9.3 8.7 - 10.5 mg/dL    Total Protein 7.4 6.0 - 8.4 g/dL    Albumin 3.4 (L) 3.5 - 5.2 g/dL    Total Bilirubin 0.2 0.1 - 1.0 mg/dL    Alkaline Phosphatase 73 55 - 135 U/L    AST 53 (H) 10 - 40 U/L    ALT 47 (H) 10 - 44 U/L    eGFR 57.4 (A) >60 mL/min/1.73 m^2    Anion Gap 12 8 - 16 mmol/L   TSH    Collection Time: 01/11/24  2:30 PM   Result Value Ref Range    TSH 2.593 0.400 - 4.000 uIU/mL   CBC W/ AUTO DIFFERENTIAL    Collection Time: 01/11/24  2:30 PM   Result Value Ref Range    WBC 6.51 3.90 - 12.70 K/uL    RBC 3.89 (L) 4.60 - 6.20 M/uL    Hemoglobin 10.7 (L) 14.0 - 18.0 g/dL    Hematocrit 33.7 (L) 40.0 - 54.0 %    MCV 87 82 - 98 fL    MCH 27.5 27.0 - 31.0 pg    MCHC 31.8 (L) 32.0 - 36.0 g/dL    RDW 22.6 (H) 11.5 - 14.5 %    Platelets 232 150 - 450 K/uL    MPV 10.2 9.2 - 12.9 fL    Immature Granulocytes 0.5 0.0 - 0.5 %    Gran # (ANC) 4.3 1.8 - 7.7 K/uL    Immature Grans (Abs) 0.03 0.00 - 0.04 K/uL    Lymph # 1.1 1.0 - 4.8 K/uL    Mono # 0.9 0.3 - 1.0 K/uL    Eos # 0.2 0.0 - 0.5 K/uL    Baso # 0.03 0.00 - 0.20 K/uL    nRBC 0 0 /100 WBC    Gran % 65.8 38.0 - 73.0 %    Lymph % 16.9 (L) 18.0 - 48.0 %    Mono % 13.2 4.0 - 15.0 %    Eosinophil % 3.1 0.0 - 8.0 %    Basophil % 0.5 0.0 - 1.9 %    Differential Method Automated    Type & Screen    Collection Time: 01/11/24  2:30 PM   Result  Value Ref Range    Group & Rh AB POS     Indirect Saundra NEG     Specimen Outdate 01/14/2024 23:59             Imaging:    10/31/23 CT CAP - Circumferential wall thickening at the pre-pyloric/pyloric region in this patient with known adenocarcinoma.  Area of nodular soft tissue excrescence versus two adjacent mildly enlarged nodes concerning for metastasis.    Path:   10/20/23 - Gastric biopsy with invasive adenocarcinoma. Loss of nuclear expression of MLH1 and PMS2.       Assessment:       1. Malignant neoplasm of pyloric antrum    2. Neoplastic (malignant) related fatigue    3. Iron deficiency anemia due to chronic blood loss    4. Anemia in neoplastic disease    5. Restless leg syndrome due to iron deficiency anemia              Plan:        # Malignant Neoplasm of Pyloric Antrum   Patient diagnosed with gastric adenocarcinoma on 10/20/23. No signs of metastasis on imaging. Patient is not a candidate for surgery or chemotherapy given his age and co-morbidities. His tumor was MSI-H (loss of nuclear expression of MLH1 and PMS2) so he is a candidate for immunotherapy. Spoke with patient and his family about the risks and benefits of immunotherapy and they are in agreement with starting it.     - Doing well today. Eating well and has a good appetite.   - Lab work reviewed. Hb improved and adequate for treatment  - will proceed with cycle 2 of Keytruda this Saturday   - Keytruda 400mg q6w     - RTC in 6 weeks for cycle 3.   - Will get repeat CT before 3rd dose of Keytruda     # JOSE JUAN, anemia in neoplastic disease, neoplastic related fatigue, restless leg syndrome  - Secondary to blood loss from gastric mass. Hb 5.7 today. Patient is feeling fairly well aside from fatigue and shortness of breath. Vital signs in clinic are stable.   - proceed with 3rd dose of Injectafer this Saturday  - Will schedule IV iron infusions  - will monitor TSH while receiving Pembro    # CVA  - some residual deficits but remains ambulatory with  assistive devices at home.    Follow up: 6 weeks for cycle 3.     The above information has been reviewed with the patient and all questions have been answered to their apparent satisfaction.  They understand that they can call the clinic with any questions.      CHARIS Padilla, PA-C  Physician Assistant Certified  Dept of Hematology/Oncology  PADrewC to Dr. Ash, Dr. Steinberg and Dr. Hall       Route Chart for Scheduling    Med Onc Chart Routing      Follow up with physician 6 weeks. See Dr. Steinberg in 6 weeks with lab work and cycle 3 of Pembro   Follow up with LANDON . See LANDON or Dr. Steinberg in 12 weeks for cycle 4 of Pembro   Infusion scheduling note    Injection scheduling note    Labs CBC, CMP and TSH   Scheduling:  Preferred lab:  Lab interval: every 6 weeks     Imaging    Pharmacy appointment    Other referrals                  Treatment Plan Information   OP PEMBROLIZUMAB 400MG Q6W   Parish Steinberg MD   Upcoming Treatment Dates - OP PEMBROLIZUMAB 400MG Q6W    12/28/2023       Chemotherapy       pembrolizumab (KEYTRUDA) 400 mg in sodium chloride 0.9% SolP 116 mL infusion  2/8/2024       Chemotherapy       pembrolizumab (KEYTRUDA) 400 mg in sodium chloride 0.9% SolP 116 mL infusion  3/21/2024       Chemotherapy       pembrolizumab (KEYTRUDA) 400 mg in sodium chloride 0.9% SolP 116 mL infusion  5/2/2024       Chemotherapy       pembrolizumab (KEYTRUDA) 400 mg in sodium chloride 0.9% SolP 116 mL infusion    Therapy Plan Information  EPINEPHrine (EPIPEN) 0.3 mg/0.3 mL pen injection 0.3 mg  0.3 mg, Intramuscular, PRN  diphenhydrAMINE injection 50 mg  50 mg, Intravenous, PRN  hydrocortisone sodium succinate injection 100 mg  100 mg, Intravenous, PRN

## 2024-01-13 ENCOUNTER — INFUSION (OUTPATIENT)
Dept: INFUSION THERAPY | Facility: HOSPITAL | Age: 89
End: 2024-01-13
Payer: COMMERCIAL

## 2024-01-13 VITALS
HEIGHT: 68 IN | RESPIRATION RATE: 18 BRPM | WEIGHT: 202.94 LBS | DIASTOLIC BLOOD PRESSURE: 66 MMHG | BODY MASS INDEX: 30.76 KG/M2 | TEMPERATURE: 98 F | SYSTOLIC BLOOD PRESSURE: 143 MMHG | OXYGEN SATURATION: 97 % | HEART RATE: 68 BPM

## 2024-01-13 DIAGNOSIS — C16.3 MALIGNANT NEOPLASM OF PYLORIC ANTRUM: ICD-10-CM

## 2024-01-13 DIAGNOSIS — D50.0 IRON DEFICIENCY ANEMIA DUE TO CHRONIC BLOOD LOSS: Primary | ICD-10-CM

## 2024-01-13 PROCEDURE — 25000003 PHARM REV CODE 250: Performed by: PHYSICIAN ASSISTANT

## 2024-01-13 PROCEDURE — 63600175 PHARM REV CODE 636 W HCPCS: Mod: JZ,JG | Performed by: PHYSICIAN ASSISTANT

## 2024-01-13 PROCEDURE — A4216 STERILE WATER/SALINE, 10 ML: HCPCS | Performed by: PHYSICIAN ASSISTANT

## 2024-01-13 PROCEDURE — 96413 CHEMO IV INFUSION 1 HR: CPT

## 2024-01-13 RX ORDER — EPINEPHRINE 0.3 MG/.3ML
0.3 INJECTION SUBCUTANEOUS ONCE AS NEEDED
Status: DISCONTINUED | OUTPATIENT
Start: 2024-01-13 | End: 2024-01-13 | Stop reason: HOSPADM

## 2024-01-13 RX ORDER — DIPHENHYDRAMINE HYDROCHLORIDE 50 MG/ML
50 INJECTION, SOLUTION INTRAMUSCULAR; INTRAVENOUS ONCE AS NEEDED
Status: DISCONTINUED | OUTPATIENT
Start: 2024-01-13 | End: 2024-01-13 | Stop reason: HOSPADM

## 2024-01-13 RX ORDER — PROCHLORPERAZINE EDISYLATE 5 MG/ML
5 INJECTION INTRAMUSCULAR; INTRAVENOUS ONCE AS NEEDED
Status: DISCONTINUED | OUTPATIENT
Start: 2024-01-13 | End: 2024-01-13 | Stop reason: HOSPADM

## 2024-01-13 RX ORDER — SODIUM CHLORIDE 0.9 % (FLUSH) 0.9 %
10 SYRINGE (ML) INJECTION
Status: DISCONTINUED | OUTPATIENT
Start: 2024-01-13 | End: 2024-01-13 | Stop reason: HOSPADM

## 2024-01-13 RX ORDER — DIPHENHYDRAMINE HYDROCHLORIDE 50 MG/ML
50 INJECTION, SOLUTION INTRAMUSCULAR; INTRAVENOUS ONCE AS NEEDED
OUTPATIENT
Start: 2024-01-13

## 2024-01-13 RX ORDER — HEPARIN 100 UNIT/ML
500 SYRINGE INTRAVENOUS
Status: DISCONTINUED | OUTPATIENT
Start: 2024-01-13 | End: 2024-01-13 | Stop reason: HOSPADM

## 2024-01-13 RX ORDER — EPINEPHRINE 0.3 MG/.3ML
0.3 INJECTION SUBCUTANEOUS ONCE AS NEEDED
OUTPATIENT
Start: 2024-01-13

## 2024-01-13 RX ADMIN — SODIUM CHLORIDE 400 MG: 9 INJECTION, SOLUTION INTRAVENOUS at 09:01

## 2024-01-13 RX ADMIN — SODIUM CHLORIDE: 9 INJECTION, SOLUTION INTRAVENOUS at 09:01

## 2024-01-13 RX ADMIN — Medication 10 ML: at 10:01

## 2024-02-22 ENCOUNTER — LAB VISIT (OUTPATIENT)
Dept: LAB | Facility: HOSPITAL | Age: 89
End: 2024-02-22
Attending: INTERNAL MEDICINE
Payer: COMMERCIAL

## 2024-02-22 ENCOUNTER — OFFICE VISIT (OUTPATIENT)
Dept: HEMATOLOGY/ONCOLOGY | Facility: CLINIC | Age: 89
End: 2024-02-22
Payer: COMMERCIAL

## 2024-02-22 VITALS
SYSTOLIC BLOOD PRESSURE: 147 MMHG | HEIGHT: 68 IN | HEART RATE: 67 BPM | BODY MASS INDEX: 31.83 KG/M2 | TEMPERATURE: 98 F | OXYGEN SATURATION: 98 % | WEIGHT: 210 LBS | DIASTOLIC BLOOD PRESSURE: 68 MMHG

## 2024-02-22 DIAGNOSIS — I63.9 CEREBROVASCULAR ACCIDENT (CVA), UNSPECIFIED MECHANISM: ICD-10-CM

## 2024-02-22 DIAGNOSIS — C16.3 MALIGNANT NEOPLASM OF PYLORIC ANTRUM: ICD-10-CM

## 2024-02-22 DIAGNOSIS — D63.0 ANEMIA IN NEOPLASTIC DISEASE: ICD-10-CM

## 2024-02-22 DIAGNOSIS — R53.0 NEOPLASTIC (MALIGNANT) RELATED FATIGUE: ICD-10-CM

## 2024-02-22 DIAGNOSIS — D50.0 IRON DEFICIENCY ANEMIA DUE TO CHRONIC BLOOD LOSS: ICD-10-CM

## 2024-02-22 DIAGNOSIS — C16.3 MALIGNANT NEOPLASM OF PYLORIC ANTRUM: Primary | ICD-10-CM

## 2024-02-22 DIAGNOSIS — G25.81 RESTLESS LEG SYNDROME DUE TO IRON DEFICIENCY ANEMIA: ICD-10-CM

## 2024-02-22 DIAGNOSIS — D50.9 RESTLESS LEG SYNDROME DUE TO IRON DEFICIENCY ANEMIA: ICD-10-CM

## 2024-02-22 LAB
ABO + RH BLD: NORMAL
ALBUMIN SERPL BCP-MCNC: 3.3 G/DL (ref 3.5–5.2)
ALP SERPL-CCNC: 70 U/L (ref 55–135)
ALT SERPL W/O P-5'-P-CCNC: 24 U/L (ref 10–44)
ANION GAP SERPL CALC-SCNC: 9 MMOL/L (ref 8–16)
AST SERPL-CCNC: 38 U/L (ref 10–40)
BASOPHILS # BLD AUTO: 0.02 K/UL (ref 0–0.2)
BASOPHILS NFR BLD: 0.3 % (ref 0–1.9)
BILIRUB SERPL-MCNC: 0.2 MG/DL (ref 0.1–1)
BLD GP AB SCN CELLS X3 SERPL QL: NORMAL
BUN SERPL-MCNC: 25 MG/DL (ref 10–30)
CALCIUM SERPL-MCNC: 9.7 MG/DL (ref 8.7–10.5)
CHLORIDE SERPL-SCNC: 104 MMOL/L (ref 95–110)
CO2 SERPL-SCNC: 26 MMOL/L (ref 23–29)
CREAT SERPL-MCNC: 1.2 MG/DL (ref 0.5–1.4)
DIFFERENTIAL METHOD BLD: ABNORMAL
EOSINOPHIL # BLD AUTO: 0.2 K/UL (ref 0–0.5)
EOSINOPHIL NFR BLD: 3.5 % (ref 0–8)
ERYTHROCYTE [DISTWIDTH] IN BLOOD BY AUTOMATED COUNT: 21.4 % (ref 11.5–14.5)
EST. GFR  (NO RACE VARIABLE): 57.1 ML/MIN/1.73 M^2
GLUCOSE SERPL-MCNC: 110 MG/DL (ref 70–110)
HCT VFR BLD AUTO: 30.4 % (ref 40–54)
HGB BLD-MCNC: 9.6 G/DL (ref 14–18)
IMM GRANULOCYTES # BLD AUTO: 0.01 K/UL (ref 0–0.04)
IMM GRANULOCYTES NFR BLD AUTO: 0.2 % (ref 0–0.5)
LYMPHOCYTES # BLD AUTO: 1 K/UL (ref 1–4.8)
LYMPHOCYTES NFR BLD: 16.4 % (ref 18–48)
MCH RBC QN AUTO: 27.2 PG (ref 27–31)
MCHC RBC AUTO-ENTMCNC: 31.6 G/DL (ref 32–36)
MCV RBC AUTO: 86 FL (ref 82–98)
MONOCYTES # BLD AUTO: 0.9 K/UL (ref 0.3–1)
MONOCYTES NFR BLD: 15.5 % (ref 4–15)
NEUTROPHILS # BLD AUTO: 3.7 K/UL (ref 1.8–7.7)
NEUTROPHILS NFR BLD: 64.1 % (ref 38–73)
NRBC BLD-RTO: 0 /100 WBC
PLATELET # BLD AUTO: 170 K/UL (ref 150–450)
PMV BLD AUTO: 9.3 FL (ref 9.2–12.9)
POTASSIUM SERPL-SCNC: 3.7 MMOL/L (ref 3.5–5.1)
PROT SERPL-MCNC: 7.1 G/DL (ref 6–8.4)
RBC # BLD AUTO: 3.53 M/UL (ref 4.6–6.2)
SODIUM SERPL-SCNC: 139 MMOL/L (ref 136–145)
SPECIMEN OUTDATE: NORMAL
TSH SERPL DL<=0.005 MIU/L-ACNC: 2.42 UIU/ML (ref 0.4–4)
WBC # BLD AUTO: 5.79 K/UL (ref 3.9–12.7)

## 2024-02-22 PROCEDURE — 84443 ASSAY THYROID STIM HORMONE: CPT | Performed by: INTERNAL MEDICINE

## 2024-02-22 PROCEDURE — 1101F PT FALLS ASSESS-DOCD LE1/YR: CPT | Mod: CPTII,S$GLB,, | Performed by: INTERNAL MEDICINE

## 2024-02-22 PROCEDURE — 80053 COMPREHEN METABOLIC PANEL: CPT | Performed by: INTERNAL MEDICINE

## 2024-02-22 PROCEDURE — 99999 PR PBB SHADOW E&M-EST. PATIENT-LVL IV: CPT | Mod: PBBFAC,,, | Performed by: INTERNAL MEDICINE

## 2024-02-22 PROCEDURE — 99215 OFFICE O/P EST HI 40 MIN: CPT | Mod: S$GLB,,, | Performed by: INTERNAL MEDICINE

## 2024-02-22 PROCEDURE — 3288F FALL RISK ASSESSMENT DOCD: CPT | Mod: CPTII,S$GLB,, | Performed by: INTERNAL MEDICINE

## 2024-02-22 PROCEDURE — 85025 COMPLETE CBC W/AUTO DIFF WBC: CPT | Performed by: PHYSICIAN ASSISTANT

## 2024-02-22 PROCEDURE — 86850 RBC ANTIBODY SCREEN: CPT | Performed by: INTERNAL MEDICINE

## 2024-02-22 PROCEDURE — 1159F MED LIST DOCD IN RCRD: CPT | Mod: CPTII,S$GLB,, | Performed by: INTERNAL MEDICINE

## 2024-02-22 PROCEDURE — 1125F AMNT PAIN NOTED PAIN PRSNT: CPT | Mod: CPTII,S$GLB,, | Performed by: INTERNAL MEDICINE

## 2024-02-22 RX ORDER — EPINEPHRINE 0.3 MG/.3ML
0.3 INJECTION SUBCUTANEOUS ONCE AS NEEDED
Status: CANCELLED | OUTPATIENT
Start: 2024-02-22

## 2024-02-22 RX ORDER — HEPARIN 100 UNIT/ML
500 SYRINGE INTRAVENOUS
Status: CANCELLED | OUTPATIENT
Start: 2024-02-22

## 2024-02-22 RX ORDER — DIPHENHYDRAMINE HYDROCHLORIDE 50 MG/ML
50 INJECTION INTRAMUSCULAR; INTRAVENOUS ONCE AS NEEDED
Status: CANCELLED | OUTPATIENT
Start: 2024-02-22

## 2024-02-22 RX ORDER — SODIUM CHLORIDE 0.9 % (FLUSH) 0.9 %
10 SYRINGE (ML) INJECTION
Status: CANCELLED | OUTPATIENT
Start: 2024-02-22

## 2024-02-22 RX ORDER — PROCHLORPERAZINE EDISYLATE 5 MG/ML
5 INJECTION INTRAMUSCULAR; INTRAVENOUS ONCE AS NEEDED
Status: CANCELLED
Start: 2024-02-22

## 2024-02-22 NOTE — PROGRESS NOTES
MEDICAL ONCOLOGY - ESTABLISHED PATIENT VISIT    Reason for visit: Gastric Adenocarcinoma     Best Contact Phone Number(s): [phone] (home)      Cancer/Stage/TNM:    Cancer Staging   Malignant neoplasm of pyloric antrum  Staging form: Stomach, AJCC 8th Edition  - Clinical stage from 10/20/2023: Stage III (cT3, cN1, cM0) - Signed by Parish Steinberg MD on 11/3/2023       Oncology History   Malignant neoplasm of pyloric antrum   10/20/2023 Cancer Staged    Staging form: Stomach, AJCC 8th Edition  - Clinical stage from 10/20/2023: Stage III (cT3, cN1, cM0)     11/2/2023 Initial Diagnosis    Malignant neoplasm of pyloric antrum     11/21/2023 -  Chemotherapy    Treatment Summary   Plan Name: OP PEMBROLIZUMAB 400MG Q6W  Treatment Goal: Palliative  Status: Active  Start Date: 11/21/2023  End Date: 9/18/2025 (Planned)  Provider: Parish Steinberg MD  Chemotherapy: [No matching medication found in this treatment plan]          History:   89 y.o. male presents for evaluation of newly diagnosed gastric adenocarcinoma. He presented to the ED in September 2023 with weakness and was found to have a Hg of 4.2. He underwent and EGD which revealed an ulcerative mass, biopsies revealed high-grade dysplasia. Had an EUS on 10/20/23 which revealed an ulcerating prepyloric mass (T3, possible N1, M0), biopsy positive for invasic adenocarcinoma. HER2 negative but dMMR (loss of MLH1 and PMS2). NGS and pharmacogenomics were sent. CT CAP on 10/31/23 revealed a circumferential pre-pyloric/pyloric thickening, there are two areas of either extension of the primary mass or enarlged lymph nodes.     Interval History: Mr Chin returns to the clinic prior to cycle 3 of immunotherapy with pembrolizumab for his MSI-H gastric cancer. He feels well overall. He has been eating well. Notices improvement in his lower extremity edema, mild hyperpigmentation in the legs.  No fevers, chills.     Patient presents with his family.    ECOG PS is  1.       ROS:   Review of Systems   Constitutional:  Negative for chills and fever.   HENT:  Negative for congestion and sore throat.    Eyes:  Negative for pain.   Respiratory:  Negative for cough and shortness of breath.    Cardiovascular:  Negative for chest pain, palpitations and leg swelling.   Gastrointestinal:  Positive for heartburn. Negative for abdominal pain, diarrhea, nausea and vomiting.   Genitourinary:  Negative for dysuria.   Musculoskeletal:  Negative for back pain.   Neurological:  Positive for weakness (left side). Negative for dizziness and headaches.   Psychiatric/Behavioral:  Negative for suicidal ideas. The patient is not nervous/anxious.        Past Medical History:   Past Medical History:   Diagnosis Date    Arthritis     In back, neck    Bell's palsy feb or march 2013    Blood clot in vein     right leg after back surgery    Cataract associated with other syndromes     GERD (gastroesophageal reflux disease)     Hypertension     Other and unspecified hyperlipidemia     Stroke     TIA (transient ischemic attack)     Feb or march 2013        Past Surgical History:   Past Surgical History:   Procedure Laterality Date    APPENDECTOMY      BACK SURGERY      COLONOSCOPY N/A 9/29/2023    Procedure: COLONOSCOPY;  Surgeon: Jan Lozano MD;  Location: [facility];  Service: Gastroenterology;  Laterality: N/A;    ENDOSCOPIC ULTRASOUND OF UPPER GASTROINTESTINAL TRACT N/A 10/20/2023    Procedure: ULTRASOUND, UPPER GI TRACT, ENDOSCOPIC;  Surgeon: Reymundo Rangel MD;  Location: [facility];  Service: Endoscopy;  Laterality: N/A;    ESOPHAGOGASTRODUODENOSCOPY N/A 9/29/2023    Procedure: EGD (ESOPHAGOGASTRODUODENOSCOPY);  Surgeon: Jan Lozano MD;  Location: [facility];  Service: Gastroenterology;  Laterality: N/A;    ESOPHAGOGASTRODUODENOSCOPY N/A 10/20/2023    Procedure: EGD (ESOPHAGOGASTRODUODENOSCOPY);  Surgeon: Reymundo Rangel MD;  Location: [facility];  Service: Endoscopy;   Laterality: N/A;  urgent EGD/EUS (radial) for gastric ulcerated mass (HGD)   Referring: Jan Lozano MD  10/17/23-Pt is no longer on Plavix, H/O CVA with left hemiparesis, instructions via portal-DS    PROSTATE SURGERY          Family History:   Family History   Problem Relation Age of Onset    Stroke Father     Hypertension Son         Social History:   Social History     Tobacco Use    Smoking status: Former     Current packs/day: 0.00     Types: Cigarettes     Quit date: 1955     Years since quittin.1    Smokeless tobacco: Not on file   Substance Use Topics    Alcohol use: No        I have reviewed and updated the patient's past medical, surgical, family and social histories.    Allergies:   Review of patient's allergies indicates:   Allergen Reactions    Prednisone         Medications:   Current Outpatient Medications   Medication Sig Dispense Refill    atorvastatin (LIPITOR) 20 MG tablet Take 1 tablet (20 mg total) by mouth once daily. 30 tablet 1    bumetanide (BUMEX) 1 MG tablet Take 1 mg by mouth once daily.      docusate sodium (COLACE) 100 MG capsule Take 1 capsule (100 mg total) by mouth 2 (two) times daily as needed (while taking pain medication). 20 capsule 0    hydroCHLOROthiazide (HYDRODIURIL) 25 MG tablet Take 25 mg by mouth once daily.      HYDROcodone-acetaminophen (NORCO) 5-325 mg per tablet Take 1 tablet by mouth every 6 (six) hours as needed for Pain. 11 tablet 0    ibuprofen (ADVIL,MOTRIN) 800 MG tablet Take 1 tablet (800 mg total) by mouth every 8 (eight) hours as needed for Pain. 90 tablet 2    irbesartan (AVAPRO) 300 MG tablet Take 300 mg by mouth once daily.      multivitamin-minerals-lutein (MULTIVITAMIN 50 PLUS) Tab Take 1 tablet by mouth once daily.      nebivolol (BYSTOLIC) 10 MG Tab Take 1 tablet (10 mg total) by mouth once daily. 30 tablet 11    omeprazole (PRILOSEC OTC) 20 MG tablet Take 2 tablets (40 mg total) by mouth once daily. 122 tablet 0    oxybutynin  "(DITROPAN-XL) 10 MG 24 hr tablet Take 10 mg by mouth once daily.      potassium chloride SA (KLOR-CON M20) 20 MEQ tablet Take 1 tablet (20 mEq total) by mouth once daily. 30 tablet 11    saw palmetto 500 MG capsule Take 500 mg by mouth once daily.      zinc gluconate 50 mg tablet Take 50 mg by mouth once daily.      amoxicillin (AMOXIL) 500 MG capsule Take 500 mg by mouth 2 (two) times daily.      clopidogrel (PLAVIX) 75 mg tablet Take 75 mg by mouth once daily.      promethazine-dextromethorphan (PROMETHAZINE-DM) 6.25-15 mg/5 mL Syrp Take 5 mLs by mouth every 6 (six) hours as needed.       No current facility-administered medications for this visit.        Physical Exam:   BP (!) 147/68 (BP Location: Left arm, Patient Position: Sitting, BP Method: Medium (Automatic))   Pulse 67   Temp 98.4 °F (36.9 °C) (Oral)   Ht 5' 8" (1.727 m)   Wt 95.2 kg (209 lb 15.8 oz)   SpO2 98%   BMI 31.93 kg/m²                Physical Exam  Constitutional:       Appearance: Normal appearance.   HENT:      Head: Normocephalic and atraumatic.      Mouth/Throat:      Mouth: Mucous membranes are moist.      Pharynx: Oropharynx is clear.   Eyes:      Extraocular Movements: Extraocular movements intact.      Pupils: Pupils are equal, round, and reactive to light.   Cardiovascular:      Rate and Rhythm: Normal rate and regular rhythm.      Pulses: Normal pulses.      Heart sounds: Normal heart sounds.   Pulmonary:      Effort: Pulmonary effort is normal.      Breath sounds: Normal breath sounds.   Abdominal:      General: Abdomen is flat.      Palpations: Abdomen is soft.      Tenderness: There is no abdominal tenderness.   Musculoskeletal:         General: Normal range of motion.      Cervical back: Normal range of motion and neck supple.      Right lower leg: No edema.      Left lower leg: No edema.   Skin:     General: Skin is warm and dry.   Neurological:      General: No focal deficit present.      Mental Status: He is alert and " oriented to person, place, and time.      Motor: Weakness (left side slightly weaker than right) present.   Psychiatric:         Mood and Affect: Mood normal.         Behavior: Behavior normal.           Labs:   Recent Results (from the past 48 hour(s))   Comprehensive Metabolic Panel    Collection Time: 02/22/24 10:16 AM   Result Value Ref Range    Sodium 139 136 - 145 mmol/L    Potassium 3.7 3.5 - 5.1 mmol/L    Chloride 104 95 - 110 mmol/L    CO2 26 23 - 29 mmol/L    Glucose 110 70 - 110 mg/dL    BUN 25 10 - 30 mg/dL    Creatinine 1.2 0.5 - 1.4 mg/dL    Calcium 9.7 8.7 - 10.5 mg/dL    Total Protein 7.1 6.0 - 8.4 g/dL    Albumin 3.3 (L) 3.5 - 5.2 g/dL    Total Bilirubin 0.2 0.1 - 1.0 mg/dL    Alkaline Phosphatase 70 55 - 135 U/L    AST 38 10 - 40 U/L    ALT 24 10 - 44 U/L    eGFR 57.1 (A) >60 mL/min/1.73 m^2    Anion Gap 9 8 - 16 mmol/L   TSH    Collection Time: 02/22/24 10:16 AM   Result Value Ref Range    TSH 2.420 0.400 - 4.000 uIU/mL   CBC W/ AUTO DIFFERENTIAL    Collection Time: 02/22/24 10:16 AM   Result Value Ref Range    WBC 5.79 3.90 - 12.70 K/uL    RBC 3.53 (L) 4.60 - 6.20 M/uL    Hemoglobin 9.6 (L) 14.0 - 18.0 g/dL    Hematocrit 30.4 (L) 40.0 - 54.0 %    MCV 86 82 - 98 fL    MCH 27.2 27.0 - 31.0 pg    MCHC 31.6 (L) 32.0 - 36.0 g/dL    RDW 21.4 (H) 11.5 - 14.5 %    Platelets 170 150 - 450 K/uL    MPV 9.3 9.2 - 12.9 fL    Immature Granulocytes 0.2 0.0 - 0.5 %    Gran # (ANC) 3.7 1.8 - 7.7 K/uL    Immature Grans (Abs) 0.01 0.00 - 0.04 K/uL    Lymph # 1.0 1.0 - 4.8 K/uL    Mono # 0.9 0.3 - 1.0 K/uL    Eos # 0.2 0.0 - 0.5 K/uL    Baso # 0.02 0.00 - 0.20 K/uL    nRBC 0 0 /100 WBC    Gran % 64.1 38.0 - 73.0 %    Lymph % 16.4 (L) 18.0 - 48.0 %    Mono % 15.5 (H) 4.0 - 15.0 %    Eosinophil % 3.5 0.0 - 8.0 %    Basophil % 0.3 0.0 - 1.9 %    Differential Method Automated    Type & Screen    Collection Time: 02/22/24 10:16 AM   Result Value Ref Range    Group & Rh AB POS     Indirect Saundra NEG     Specimen Outdate  02/25/2024 23:59             Imaging:    10/31/23 CT CAP - Circumferential wall thickening at the pre-pyloric/pyloric region in this patient with known adenocarcinoma.  Area of nodular soft tissue excrescence versus two adjacent mildly enlarged nodes concerning for metastasis.    Path:   10/20/23 - Gastric biopsy with invasive adenocarcinoma. Loss of nuclear expression of MLH1 and PMS2.       Assessment:       1. Malignant neoplasm of pyloric antrum    2. Neoplastic (malignant) related fatigue    3. Iron deficiency anemia due to chronic blood loss    4. Anemia in neoplastic disease    5. Restless leg syndrome due to iron deficiency anemia    6. Cerebrovascular accident (CVA), unspecified mechanism                Plan:        # Malignant Neoplasm of Pyloric Antrum   Patient diagnosed with gastric adenocarcinoma on 10/20/23. No signs of metastasis on imaging. Patient is not a candidate for surgery or chemotherapy given his age and co-morbidities. His tumor was MSI-H (loss of nuclear expression of MLH1 and PMS2) so he is a candidate for immunotherapy. Spoke with patient and his family about the risks and benefits of immunotherapy and they are in agreement with starting it.     Began cycle 1 of q6 week Keytruda on 11/21/23.  Tolerating well.  Anemia improving.  Proceed with cycle 3 this week of Keytruda 400mg.    - RTC in 6 weeks for cycle 4.   - Will get repeat CT in next couple weeks.    # JOSE JUAN, anemia in neoplastic disease, neoplastic related fatigue, restless leg syndrome  - Secondary to blood loss from gastric mass. Improved.   - Received Injectafer.  - will monitor TSH while receiving Pembro    # CVA  - some residual deficits but remains ambulatory with assistive devices at home.    Follow up: 6 weeks for cycle 4.     The above information has been reviewed with the patient and all questions have been answered to their apparent satisfaction.  They understand that they can call the clinic with any  questions.    Parish Steinberg MD  Hematology/Oncology  [facility]        Route Chart for Scheduling    Med Onc Chart Routing      Follow up with physician 6 weeks. for keytruda   Follow up with LANDON    Infusion scheduling note    Injection scheduling note    Labs CBC, CMP and TSH   Scheduling:  Preferred lab:  Lab interval: every 6 weeks     Imaging CT chest abdomen pelvis   CT in next couple weeks   Pharmacy appointment    Other referrals                  Treatment Plan Information   OP PEMBROLIZUMAB 400MG Q6W   Parish Steinberg MD   Upcoming Treatment Dates - OP PEMBROLIZUMAB 400MG Q6W    2/8/2024       Chemotherapy       pembrolizumab (KEYTRUDA) 400 mg in sodium chloride 0.9% SolP 116 mL infusion  3/21/2024       Chemotherapy       pembrolizumab (KEYTRUDA) 400 mg in sodium chloride 0.9% SolP 116 mL infusion  5/2/2024       Chemotherapy       pembrolizumab (KEYTRUDA) 400 mg in sodium chloride 0.9% SolP 116 mL infusion  6/13/2024       Chemotherapy       pembrolizumab (KEYTRUDA) 400 mg in sodium chloride 0.9% SolP 116 mL infusion    Therapy Plan Information  EPINEPHrine (EPIPEN) 0.3 mg/0.3 mL pen injection 0.3 mg  0.3 mg, Intramuscular, PRN  diphenhydrAMINE injection 50 mg  50 mg, Intravenous, PRN  hydrocortisone sodium succinate injection 100 mg  100 mg, Intravenous, PRN

## 2024-02-24 ENCOUNTER — INFUSION (OUTPATIENT)
Dept: INFUSION THERAPY | Facility: HOSPITAL | Age: 89
End: 2024-02-24
Payer: COMMERCIAL

## 2024-02-24 VITALS
RESPIRATION RATE: 18 BRPM | HEIGHT: 68 IN | TEMPERATURE: 98 F | WEIGHT: 209.88 LBS | SYSTOLIC BLOOD PRESSURE: 160 MMHG | HEART RATE: 60 BPM | BODY MASS INDEX: 31.81 KG/M2 | DIASTOLIC BLOOD PRESSURE: 68 MMHG

## 2024-02-24 DIAGNOSIS — C16.3 MALIGNANT NEOPLASM OF PYLORIC ANTRUM: Primary | ICD-10-CM

## 2024-02-24 PROCEDURE — 25000003 PHARM REV CODE 250: Performed by: INTERNAL MEDICINE

## 2024-02-24 PROCEDURE — 96413 CHEMO IV INFUSION 1 HR: CPT

## 2024-02-24 PROCEDURE — 63600175 PHARM REV CODE 636 W HCPCS: Mod: JZ,JG | Performed by: INTERNAL MEDICINE

## 2024-02-24 RX ORDER — SODIUM CHLORIDE 0.9 % (FLUSH) 0.9 %
10 SYRINGE (ML) INJECTION
Status: DISCONTINUED | OUTPATIENT
Start: 2024-02-24 | End: 2024-02-24 | Stop reason: HOSPADM

## 2024-02-24 RX ORDER — PROCHLORPERAZINE EDISYLATE 5 MG/ML
5 INJECTION INTRAMUSCULAR; INTRAVENOUS ONCE AS NEEDED
Status: DISCONTINUED | OUTPATIENT
Start: 2024-02-24 | End: 2024-02-24 | Stop reason: HOSPADM

## 2024-02-24 RX ORDER — EPINEPHRINE 0.3 MG/.3ML
0.3 INJECTION SUBCUTANEOUS ONCE AS NEEDED
Status: DISCONTINUED | OUTPATIENT
Start: 2024-02-24 | End: 2024-02-24 | Stop reason: HOSPADM

## 2024-02-24 RX ORDER — DIPHENHYDRAMINE HYDROCHLORIDE 50 MG/ML
50 INJECTION INTRAMUSCULAR; INTRAVENOUS ONCE AS NEEDED
Status: DISCONTINUED | OUTPATIENT
Start: 2024-02-24 | End: 2024-02-24 | Stop reason: HOSPADM

## 2024-02-24 RX ORDER — HEPARIN 100 UNIT/ML
500 SYRINGE INTRAVENOUS
Status: DISCONTINUED | OUTPATIENT
Start: 2024-02-24 | End: 2024-02-24 | Stop reason: HOSPADM

## 2024-02-24 RX ADMIN — SODIUM CHLORIDE 400 MG: 9 INJECTION, SOLUTION INTRAVENOUS at 08:02

## 2024-03-05 ENCOUNTER — TELEPHONE (OUTPATIENT)
Dept: HEMATOLOGY/ONCOLOGY | Facility: CLINIC | Age: 89
End: 2024-03-05
Payer: COMMERCIAL

## 2024-04-04 ENCOUNTER — HOSPITAL ENCOUNTER (OUTPATIENT)
Dept: RADIOLOGY | Facility: HOSPITAL | Age: 89
Discharge: HOME | End: 2024-04-04
Attending: INTERNAL MEDICINE
Payer: COMMERCIAL

## 2024-04-04 ENCOUNTER — OFFICE VISIT (OUTPATIENT)
Dept: HEMATOLOGY/ONCOLOGY | Facility: CLINIC | Age: 89
End: 2024-04-04
Payer: COMMERCIAL

## 2024-04-04 VITALS
OXYGEN SATURATION: 97 % | BODY MASS INDEX: 33.41 KG/M2 | SYSTOLIC BLOOD PRESSURE: 147 MMHG | WEIGHT: 207.88 LBS | HEIGHT: 66 IN | HEART RATE: 60 BPM | TEMPERATURE: 98 F | DIASTOLIC BLOOD PRESSURE: 67 MMHG

## 2024-04-04 DIAGNOSIS — M47.892 OTHER OSTEOARTHRITIS OF SPINE, CERVICAL REGION: ICD-10-CM

## 2024-04-04 DIAGNOSIS — M62.81 MUSCLE WEAKNESS: ICD-10-CM

## 2024-04-04 DIAGNOSIS — D50.0 IRON DEFICIENCY ANEMIA DUE TO CHRONIC BLOOD LOSS: ICD-10-CM

## 2024-04-04 DIAGNOSIS — I63.9 CEREBROVASCULAR ACCIDENT (CVA), UNSPECIFIED MECHANISM: ICD-10-CM

## 2024-04-04 DIAGNOSIS — C16.3 MALIGNANT NEOPLASM OF PYLORIC ANTRUM: Primary | ICD-10-CM

## 2024-04-04 DIAGNOSIS — D50.9 RESTLESS LEG SYNDROME DUE TO IRON DEFICIENCY ANEMIA: ICD-10-CM

## 2024-04-04 DIAGNOSIS — M62.838 MUSCLE SPASM: Primary | ICD-10-CM

## 2024-04-04 DIAGNOSIS — C16.3 MALIGNANT NEOPLASM OF PYLORIC ANTRUM: ICD-10-CM

## 2024-04-04 DIAGNOSIS — G25.81 RESTLESS LEG SYNDROME DUE TO IRON DEFICIENCY ANEMIA: ICD-10-CM

## 2024-04-04 DIAGNOSIS — D63.0 ANEMIA IN NEOPLASTIC DISEASE: ICD-10-CM

## 2024-04-04 DIAGNOSIS — R53.0 NEOPLASTIC (MALIGNANT) RELATED FATIGUE: ICD-10-CM

## 2024-04-04 LAB
CREAT SERPL-MCNC: 1.4 MG/DL (ref 0.5–1.4)
SAMPLE: NORMAL

## 2024-04-04 PROCEDURE — 1101F PT FALLS ASSESS-DOCD LE1/YR: CPT | Mod: CPTII,S$GLB,, | Performed by: PHYSICIAN ASSISTANT

## 2024-04-04 PROCEDURE — 1160F RVW MEDS BY RX/DR IN RCRD: CPT | Mod: CPTII,S$GLB,, | Performed by: PHYSICIAN ASSISTANT

## 2024-04-04 PROCEDURE — A9698 NON-RAD CONTRAST MATERIALNOC: HCPCS | Performed by: INTERNAL MEDICINE

## 2024-04-04 PROCEDURE — 99215 OFFICE O/P EST HI 40 MIN: CPT | Mod: S$GLB,,, | Performed by: PHYSICIAN ASSISTANT

## 2024-04-04 PROCEDURE — 1125F AMNT PAIN NOTED PAIN PRSNT: CPT | Mod: CPTII,S$GLB,, | Performed by: PHYSICIAN ASSISTANT

## 2024-04-04 PROCEDURE — G2211 COMPLEX E/M VISIT ADD ON: HCPCS | Mod: S$GLB,,, | Performed by: PHYSICIAN ASSISTANT

## 2024-04-04 PROCEDURE — 74177 CT ABD & PELVIS W/CONTRAST: CPT | Mod: 26,,, | Performed by: RADIOLOGY

## 2024-04-04 PROCEDURE — 25500020 PHARM REV CODE 255: Performed by: INTERNAL MEDICINE

## 2024-04-04 PROCEDURE — 74177 CT ABD & PELVIS W/CONTRAST: CPT | Mod: TC

## 2024-04-04 PROCEDURE — 1159F MED LIST DOCD IN RCRD: CPT | Mod: CPTII,S$GLB,, | Performed by: PHYSICIAN ASSISTANT

## 2024-04-04 PROCEDURE — 71260 CT THORAX DX C+: CPT | Mod: 26,,, | Performed by: RADIOLOGY

## 2024-04-04 PROCEDURE — 3288F FALL RISK ASSESSMENT DOCD: CPT | Mod: CPTII,S$GLB,, | Performed by: PHYSICIAN ASSISTANT

## 2024-04-04 PROCEDURE — 99999 PR PBB SHADOW E&M-EST. PATIENT-LVL V: CPT | Mod: PBBFAC,,, | Performed by: PHYSICIAN ASSISTANT

## 2024-04-04 RX ORDER — PROCHLORPERAZINE EDISYLATE 5 MG/ML
5 INJECTION INTRAMUSCULAR; INTRAVENOUS ONCE AS NEEDED
Status: CANCELLED
Start: 2024-04-04

## 2024-04-04 RX ORDER — EPINEPHRINE 0.3 MG/.3ML
0.3 INJECTION SUBCUTANEOUS ONCE AS NEEDED
Status: CANCELLED | OUTPATIENT
Start: 2024-04-04

## 2024-04-04 RX ORDER — HEPARIN 100 UNIT/ML
500 SYRINGE INTRAVENOUS
Status: CANCELLED | OUTPATIENT
Start: 2024-04-04

## 2024-04-04 RX ORDER — DIPHENHYDRAMINE HYDROCHLORIDE 50 MG/ML
50 INJECTION INTRAMUSCULAR; INTRAVENOUS ONCE AS NEEDED
Status: CANCELLED | OUTPATIENT
Start: 2024-04-04

## 2024-04-04 RX ORDER — SODIUM CHLORIDE 0.9 % (FLUSH) 0.9 %
10 SYRINGE (ML) INJECTION
Status: CANCELLED | OUTPATIENT
Start: 2024-04-04

## 2024-04-04 RX ORDER — CYCLOBENZAPRINE HCL 5 MG
5 TABLET ORAL NIGHTLY
Qty: 30 TABLET | Refills: 3 | Status: SHIPPED | OUTPATIENT
Start: 2024-04-04 | End: 2024-05-04

## 2024-04-04 RX ADMIN — Medication 450 ML: at 01:04

## 2024-04-04 RX ADMIN — IOHEXOL 100 ML: 350 INJECTION, SOLUTION INTRAVENOUS at 01:04

## 2024-04-04 NOTE — Clinical Note
Hi team. I have placed orders for HH for him to perform PT/OT. Can we assist him with getting this scheduled? Thank you

## 2024-04-04 NOTE — PROGRESS NOTES
MEDICAL ONCOLOGY - ESTABLISHED PATIENT VISIT    Reason for visit: Gastric Adenocarcinoma     Best Contact Phone Number(s): [phone] (home)      Cancer/Stage/TNM:    Cancer Staging   Malignant neoplasm of pyloric antrum  Staging form: Stomach, AJCC 8th Edition  - Clinical stage from 10/20/2023: Stage III (cT3, cN1, cM0) - Signed by Parish Steinberg MD on 11/3/2023       Oncology History   Malignant neoplasm of pyloric antrum   10/20/2023 Cancer Staged    Staging form: Stomach, AJCC 8th Edition  - Clinical stage from 10/20/2023: Stage III (cT3, cN1, cM0)     11/2/2023 Initial Diagnosis    Malignant neoplasm of pyloric antrum     11/21/2023 -  Chemotherapy    Treatment Summary   Plan Name: OP PEMBROLIZUMAB 400MG Q6W  Treatment Goal: Palliative  Status: Active  Start Date: 11/21/2023  End Date: 9/18/2025 (Planned)  Provider: Parish Steinberg MD  Chemotherapy: [No matching medication found in this treatment plan]          History:   89 y.o. male presents for evaluation of newly diagnosed gastric adenocarcinoma. He presented to the ED in September 2023 with weakness and was found to have a Hg of 4.2. He underwent and EGD which revealed an ulcerative mass, biopsies revealed high-grade dysplasia. Had an EUS on 10/20/23 which revealed an ulcerating prepyloric mass (T3, possible N1, M0), biopsy positive for invasic adenocarcinoma. HER2 negative but dMMR (loss of MLH1 and PMS2). NGS and pharmacogenomics were sent. CT CAP on 10/31/23 revealed a circumferential pre-pyloric/pyloric thickening, there are two areas of either extension of the primary mass or enarlged lymph nodes.     Interval History: Mr Chin returns to the clinic prior to cycle 4 of immunotherapy with pembrolizumab for his MSI-H gastric cancer. He feels well overall but he does report pain within his hands, shoulders. He has been eating well. Notices improvement in his lower extremity edema, mild hyperpigmentation in the legs.  No fevers, chills.      Patient presents with his family.  Had a CT CAP performed today  ECOG PS is 1.       ROS:   Review of Systems   Constitutional:  Negative for chills and fever.   HENT:  Negative for congestion and sore throat.         Headache   Eyes:  Negative for pain.   Respiratory:  Negative for cough and shortness of breath.    Cardiovascular:  Negative for chest pain, palpitations and leg swelling.   Gastrointestinal:  Positive for heartburn. Negative for abdominal pain, diarrhea, nausea and vomiting.   Genitourinary:  Negative for dysuria.   Musculoskeletal:  Positive for joint pain and myalgias. Negative for back pain.   Neurological:  Positive for weakness (left side). Negative for dizziness and headaches.   Psychiatric/Behavioral:  Negative for suicidal ideas. The patient is not nervous/anxious.        Past Medical History:   Past Medical History:   Diagnosis Date    Arthritis     In back, neck    Bell's palsy feb or march 2013    Blood clot in vein     right leg after back surgery    Cataract associated with other syndromes     GERD (gastroesophageal reflux disease)     Hypertension     Other and unspecified hyperlipidemia     Stroke     TIA (transient ischemic attack)     Feb or march 2013        Past Surgical History:   Past Surgical History:   Procedure Laterality Date    APPENDECTOMY      BACK SURGERY      COLONOSCOPY N/A 9/29/2023    Procedure: COLONOSCOPY;  Surgeon: Jan Lozano MD;  Location: [facility];  Service: Gastroenterology;  Laterality: N/A;    ENDOSCOPIC ULTRASOUND OF UPPER GASTROINTESTINAL TRACT N/A 10/20/2023    Procedure: ULTRASOUND, UPPER GI TRACT, ENDOSCOPIC;  Surgeon: Reymundo Rangel MD;  Location: [facility];  Service: Endoscopy;  Laterality: N/A;    ESOPHAGOGASTRODUODENOSCOPY N/A 9/29/2023    Procedure: EGD (ESOPHAGOGASTRODUODENOSCOPY);  Surgeon: Jan Lozano MD;  Location: [facility];  Service: Gastroenterology;  Laterality: N/A;    ESOPHAGOGASTRODUODENOSCOPY N/A  10/20/2023    Procedure: EGD (ESOPHAGOGASTRODUODENOSCOPY);  Surgeon: Reymundo Rangel MD;  Location: [facility];  Service: Endoscopy;  Laterality: N/A;  urgent EGD/EUS (radial) for gastric ulcerated mass (HGD)   Referring: Jan Lozano MD  10/17/23-Pt is no longer on Plavix, H/O CVA with left hemiparesis, instructions via portal-DS    PROSTATE SURGERY          Family History:   Family History   Problem Relation Age of Onset    Stroke Father     Hypertension Son         Social History:   Social History     Tobacco Use    Smoking status: Former     Current packs/day: 0.00     Types: Cigarettes     Quit date: 1955     Years since quittin.3    Smokeless tobacco: Not on file   Substance Use Topics    Alcohol use: No        I have reviewed and updated the patient's past medical, surgical, family and social histories.    Allergies:   Review of patient's allergies indicates:   Allergen Reactions    Prednisone         Medications:   Current Outpatient Medications   Medication Sig Dispense Refill    amoxicillin (AMOXIL) 500 MG capsule Take 500 mg by mouth 2 (two) times daily.      atorvastatin (LIPITOR) 20 MG tablet Take 1 tablet (20 mg total) by mouth once daily. 30 tablet 1    bumetanide (BUMEX) 1 MG tablet Take 1 mg by mouth once daily.      docusate sodium (COLACE) 100 MG capsule Take 1 capsule (100 mg total) by mouth 2 (two) times daily as needed (while taking pain medication). 20 capsule 0    hydroCHLOROthiazide (HYDRODIURIL) 25 MG tablet Take 25 mg by mouth once daily.      HYDROcodone-acetaminophen (NORCO) 5-325 mg per tablet Take 1 tablet by mouth every 6 (six) hours as needed for Pain. 11 tablet 0    ibuprofen (ADVIL,MOTRIN) 800 MG tablet Take 1 tablet (800 mg total) by mouth every 8 (eight) hours as needed for Pain. 90 tablet 2    irbesartan (AVAPRO) 300 MG tablet Take 300 mg by mouth once daily.      multivitamin-minerals-lutein (MULTIVITAMIN 50 PLUS) Tab Take 1 tablet by mouth once daily.       "nebivolol (BYSTOLIC) 10 MG Tab Take 1 tablet (10 mg total) by mouth once daily. 30 tablet 11    omeprazole (PRILOSEC OTC) 20 MG tablet Take 2 tablets (40 mg total) by mouth once daily. 122 tablet 0    oxybutynin (DITROPAN-XL) 10 MG 24 hr tablet Take 10 mg by mouth once daily.      potassium chloride SA (KLOR-CON M20) 20 MEQ tablet Take 1 tablet (20 mEq total) by mouth once daily. 30 tablet 11    promethazine-dextromethorphan (PROMETHAZINE-DM) 6.25-15 mg/5 mL Syrp Take 5 mLs by mouth every 6 (six) hours as needed.      saw palmetto 500 MG capsule Take 500 mg by mouth once daily.      zinc gluconate 50 mg tablet Take 50 mg by mouth once daily.      clopidogrel (PLAVIX) 75 mg tablet Take 75 mg by mouth once daily.       No current facility-administered medications for this visit.        Physical Exam:   BP (!) 147/67 (BP Location: Left arm, Patient Position: Sitting, BP Method: Medium (Automatic))   Pulse 60   Temp 97.7 °F (36.5 °C) (Oral)   Ht 5' 6" (1.676 m)   Wt 94.3 kg (207 lb 14.3 oz)   SpO2 97%   BMI 33.55 kg/m²                Physical Exam  Constitutional:       Appearance: Normal appearance.   HENT:      Head: Normocephalic and atraumatic.      Mouth/Throat:      Mouth: Mucous membranes are moist.      Pharynx: Oropharynx is clear.   Eyes:      Extraocular Movements: Extraocular movements intact.      Pupils: Pupils are equal, round, and reactive to light.   Cardiovascular:      Rate and Rhythm: Normal rate and regular rhythm.      Pulses: Normal pulses.      Heart sounds: Normal heart sounds.   Pulmonary:      Effort: Pulmonary effort is normal.      Breath sounds: Normal breath sounds.   Abdominal:      General: Abdomen is flat.      Palpations: Abdomen is soft.      Tenderness: There is no abdominal tenderness.   Musculoskeletal:         General: Normal range of motion.      Cervical back: Normal range of motion and neck supple.      Right lower leg: No edema.      Left lower leg: No edema.   Skin:     " General: Skin is warm and dry.   Neurological:      General: No focal deficit present.      Mental Status: He is alert and oriented to person, place, and time.      Motor: Weakness (left side slightly weaker than right) present.   Psychiatric:         Mood and Affect: Mood normal.         Behavior: Behavior normal.           Labs:   Recent Results (from the past 48 hour(s))   CBC W/ AUTO DIFFERENTIAL    Collection Time: 24  1:25 PM   Result Value Ref Range    WBC 4.76 3.90 - 12.70 K/uL    RBC 3.43 (L) 4.60 - 6.20 M/uL    Hemoglobin 10.0 (L) 14.0 - 18.0 g/dL    Hematocrit 31.0 (L) 40.0 - 54.0 %    MCV 90 82 - 98 fL    MCH 29.2 27.0 - 31.0 pg    MCHC 32.3 32.0 - 36.0 g/dL    RDW 15.5 (H) 11.5 - 14.5 %    Platelets 177 150 - 450 K/uL    MPV 10.3 9.2 - 12.9 fL    Immature Granulocytes 0.4 0.0 - 0.5 %    Gran # (ANC) 2.8 1.8 - 7.7 K/uL    Immature Grans (Abs) 0.02 0.00 - 0.04 K/uL    Lymph # 1.0 1.0 - 4.8 K/uL    Mono # 0.8 0.3 - 1.0 K/uL    Eos # 0.2 0.0 - 0.5 K/uL    Baso # 0.03 0.00 - 0.20 K/uL    nRBC 0 0 /100 WBC    Gran % 58.0 38.0 - 73.0 %    Lymph % 20.4 18.0 - 48.0 %    Mono % 16.8 (H) 4.0 - 15.0 %    Eosinophil % 3.8 0.0 - 8.0 %    Basophil % 0.6 0.0 - 1.9 %    Differential Method Automated    Comprehensive Metabolic Panel    Collection Time: 24  1:25 PM   Result Value Ref Range    Sodium 136 136 - 145 mmol/L    Potassium 4.0 3.5 - 5.1 mmol/L    Chloride 102 95 - 110 mmol/L       Imagin/4/2024 CT CAP-    Impression:     Persistent thickening of the gastric antrum though decreased compared to prior with measurements as above.  Adjacent nodes also slightly decreased in size.     Hypodensity in the liver and kidneys likely cysts.     Prostate is heterogeneous.  Bladder is not distended which may account for the anterior bladder wall thickening.    10/31/23 CT CAP - Circumferential wall thickening at the pre-pyloric/pyloric region in this patient with known adenocarcinoma.  Area of nodular soft  tissue excrescence versus two adjacent mildly enlarged nodes concerning for metastasis.    Path:   10/20/23 - Gastric biopsy with invasive adenocarcinoma. Loss of nuclear expression of MLH1 and PMS2.       Assessment:       1. Malignant neoplasm of pyloric antrum    2. Neoplastic (malignant) related fatigue    3. Iron deficiency anemia due to chronic blood loss    4. Anemia in neoplastic disease    5. Restless leg syndrome due to iron deficiency anemia    6. Cerebrovascular accident (CVA), unspecified mechanism    7. Other osteoarthritis of spine, cervical region    8. Muscle weakness          Plan:        # Malignant Neoplasm of Pyloric Antrum   Patient diagnosed with gastric adenocarcinoma on 10/20/23. No signs of metastasis on imaging. Patient is not a candidate for surgery or chemotherapy given his age and co-morbidities. His tumor was MSI-H (loss of nuclear expression of MLH1 and PMS2) so he is a candidate for immunotherapy. Spoke with patient and his family about the risks and benefits of immunotherapy and they are in agreement with starting it.     Began cycle 1 of q6 week Keytruda on 11/21/23.  Tolerating well. Eating well and has a good appetite  Anemia improving.  CT CAP results display improvement in the thickening of the gastric antrum and adjacent lymph nodes. Good response, recommend to c/w with Keytruda  Proceed with cycle 4 this week of Keytruda 400mg.    - RTC in 6 weeks for cycle 5.   .  # JOSE JUAN, anemia in neoplastic disease, neoplastic related fatigue, restless leg syndrome  - Secondary to blood loss from gastric mass. Improved.   - Received Injectafer.  - will monitor TSH while receiving Pembro    # CVA, hand and shoulder pain  - some residual deficits but remains ambulatory with assistive devices at home.  - will consider PT. Placed orders for HH for OT/PT    Follow up: 6 weeks for cycle 5.     The above information has been reviewed with the patient and all questions have been answered to their  apparent satisfaction.  They understand that they can call the clinic with any questions.      Route Chart for Scheduling    Med Onc Chart Routing      Follow up with physician 6 weeks. See Dr. Steinberg in 6 weeks and 12 weeks with lab work and Pembro   Follow up with L[facility]ON    Infusion scheduling note    Injection scheduling note    Labs CBC, CMP, TSH and type and screen   Scheduling:  Preferred lab:  Lab interval: every 6 weeks     Imaging    Pharmacy appointment    Other referrals                  Treatment Plan Information   OP PEMBROLIZUMAB 400MG Q6W   Parish Steinberg MD   Upcoming Treatment Dates - OP PEMBROLIZUMAB 400MG Q6W    3/21/2024       Chemotherapy       pembrolizumab (KEYTRUDA) 400 mg in sodium chloride 0.9% SolP 116 mL infusion  5/2/2024       Chemotherapy       pembrolizumab (KEYTRUDA) 400 mg in sodium chloride 0.9% SolP 116 mL infusion  6/13/2024       Chemotherapy       pembrolizumab (KEYTRUDA) 400 mg in sodium chloride 0.9% SolP 116 mL infusion  7/25/2024       Chemotherapy       pembrolizumab (KEYTRUDA) 400 mg in sodium chloride 0.9% SolP 116 mL infusion    Therapy Plan Information  EPINEPHrine (EPIPEN) 0.3 mg/0.3 mL pen injection 0.3 mg  0.3 mg, Intramuscular, PRN  diphenhydrAMINE injection 50 mg  50 mg, Intravenous, PRN  hydrocortisone sodium succinate injection 100 mg  100 mg, Intravenous, PRN

## 2024-04-05 ENCOUNTER — DOCUMENTATION ONLY (OUTPATIENT)
Dept: ONCOLOGY | Facility: HOSPITAL | Age: 89
End: 2024-04-05
Payer: COMMERCIAL

## 2024-04-05 NOTE — PROGRESS NOTES
Received referral from the clinic that the patient was in need of home health services. Faxed referral to in network provider [facility]. Awaiting call back about acceptance.

## 2024-04-06 ENCOUNTER — INFUSION (OUTPATIENT)
Dept: INFUSION THERAPY | Facility: HOSPITAL | Age: 89
End: 2024-04-06
Payer: COMMERCIAL

## 2024-04-06 VITALS
SYSTOLIC BLOOD PRESSURE: 161 MMHG | HEIGHT: 66 IN | RESPIRATION RATE: 18 BRPM | HEART RATE: 69 BPM | BODY MASS INDEX: 33.41 KG/M2 | WEIGHT: 207.88 LBS | TEMPERATURE: 98 F | DIASTOLIC BLOOD PRESSURE: 67 MMHG | OXYGEN SATURATION: 96 %

## 2024-04-06 DIAGNOSIS — C16.3 MALIGNANT NEOPLASM OF PYLORIC ANTRUM: Primary | ICD-10-CM

## 2024-04-06 PROCEDURE — 63600175 PHARM REV CODE 636 W HCPCS: Mod: JZ,JG | Performed by: PHYSICIAN ASSISTANT

## 2024-04-06 PROCEDURE — A4216 STERILE WATER/SALINE, 10 ML: HCPCS | Performed by: PHYSICIAN ASSISTANT

## 2024-04-06 PROCEDURE — 96413 CHEMO IV INFUSION 1 HR: CPT

## 2024-04-06 PROCEDURE — 25000003 PHARM REV CODE 250: Performed by: PHYSICIAN ASSISTANT

## 2024-04-06 RX ORDER — DIPHENHYDRAMINE HYDROCHLORIDE 50 MG/ML
50 INJECTION INTRAMUSCULAR; INTRAVENOUS ONCE AS NEEDED
Status: DISCONTINUED | OUTPATIENT
Start: 2024-04-06 | End: 2024-04-06 | Stop reason: HOSPADM

## 2024-04-06 RX ORDER — SODIUM CHLORIDE 0.9 % (FLUSH) 0.9 %
10 SYRINGE (ML) INJECTION
Status: DISCONTINUED | OUTPATIENT
Start: 2024-04-06 | End: 2024-04-06 | Stop reason: HOSPADM

## 2024-04-06 RX ORDER — PROCHLORPERAZINE EDISYLATE 5 MG/ML
5 INJECTION INTRAMUSCULAR; INTRAVENOUS ONCE AS NEEDED
Status: DISCONTINUED | OUTPATIENT
Start: 2024-04-06 | End: 2024-04-06 | Stop reason: HOSPADM

## 2024-04-06 RX ORDER — EPINEPHRINE 0.3 MG/.3ML
0.3 INJECTION SUBCUTANEOUS ONCE AS NEEDED
Status: DISCONTINUED | OUTPATIENT
Start: 2024-04-06 | End: 2024-04-06 | Stop reason: HOSPADM

## 2024-04-06 RX ORDER — HEPARIN 100 UNIT/ML
500 SYRINGE INTRAVENOUS
Status: DISCONTINUED | OUTPATIENT
Start: 2024-04-06 | End: 2024-04-06 | Stop reason: HOSPADM

## 2024-04-06 RX ADMIN — SODIUM CHLORIDE: 9 INJECTION, SOLUTION INTRAVENOUS at 09:04

## 2024-04-06 RX ADMIN — SODIUM CHLORIDE 400 MG: 9 INJECTION, SOLUTION INTRAVENOUS at 09:04

## 2024-04-06 RX ADMIN — Medication 10 ML: at 10:04

## 2024-04-06 NOTE — PLAN OF CARE
Pt tolerated Keytruda well. No adverse reaction noted. Pt education reinforced on chemo regimen, side effects, what to expect, and when to call Dr. Pt verbalized understanding. I reviewed pt calendar w/ pt and understanding verbalized.

## 2024-05-15 ENCOUNTER — EXTERNAL HOME HEALTH (OUTPATIENT)
Dept: HOME HEALTH SERVICES | Facility: HOSPITAL | Age: 89
End: 2024-05-15
Payer: COMMERCIAL

## 2024-05-16 ENCOUNTER — OFFICE VISIT (OUTPATIENT)
Dept: HEMATOLOGY/ONCOLOGY | Facility: CLINIC | Age: 89
End: 2024-05-16
Payer: COMMERCIAL

## 2024-05-16 ENCOUNTER — INFUSION (OUTPATIENT)
Dept: INFUSION THERAPY | Facility: HOSPITAL | Age: 89
End: 2024-05-16
Payer: COMMERCIAL

## 2024-05-16 ENCOUNTER — LAB VISIT (OUTPATIENT)
Dept: LAB | Facility: HOSPITAL | Age: 89
End: 2024-05-16
Payer: COMMERCIAL

## 2024-05-16 VITALS
DIASTOLIC BLOOD PRESSURE: 71 MMHG | HEART RATE: 57 BPM | TEMPERATURE: 98 F | RESPIRATION RATE: 18 BRPM | SYSTOLIC BLOOD PRESSURE: 165 MMHG

## 2024-05-16 VITALS
OXYGEN SATURATION: 96 % | BODY MASS INDEX: 30.29 KG/M2 | DIASTOLIC BLOOD PRESSURE: 76 MMHG | SYSTOLIC BLOOD PRESSURE: 168 MMHG | HEIGHT: 68 IN | HEART RATE: 62 BPM | TEMPERATURE: 98 F | WEIGHT: 199.88 LBS

## 2024-05-16 DIAGNOSIS — I63.9 CEREBROVASCULAR ACCIDENT (CVA), UNSPECIFIED MECHANISM: ICD-10-CM

## 2024-05-16 DIAGNOSIS — G25.81 RESTLESS LEG SYNDROME DUE TO IRON DEFICIENCY ANEMIA: ICD-10-CM

## 2024-05-16 DIAGNOSIS — D63.0 ANEMIA IN NEOPLASTIC DISEASE: ICD-10-CM

## 2024-05-16 DIAGNOSIS — C16.3 MALIGNANT NEOPLASM OF PYLORIC ANTRUM: ICD-10-CM

## 2024-05-16 DIAGNOSIS — M47.892 OTHER OSTEOARTHRITIS OF SPINE, CERVICAL REGION: ICD-10-CM

## 2024-05-16 DIAGNOSIS — G89.3 NEOPLASM RELATED PAIN: ICD-10-CM

## 2024-05-16 DIAGNOSIS — C16.3 MALIGNANT NEOPLASM OF PYLORIC ANTRUM: Primary | ICD-10-CM

## 2024-05-16 DIAGNOSIS — D50.0 IRON DEFICIENCY ANEMIA DUE TO CHRONIC BLOOD LOSS: ICD-10-CM

## 2024-05-16 DIAGNOSIS — D50.9 RESTLESS LEG SYNDROME DUE TO IRON DEFICIENCY ANEMIA: ICD-10-CM

## 2024-05-16 DIAGNOSIS — R53.0 NEOPLASTIC (MALIGNANT) RELATED FATIGUE: ICD-10-CM

## 2024-05-16 DIAGNOSIS — M62.81 MUSCLE WEAKNESS: ICD-10-CM

## 2024-05-16 LAB
ABO + RH BLD: NORMAL
ALBUMIN SERPL BCP-MCNC: 3.8 G/DL (ref 3.5–5.2)
ALP SERPL-CCNC: 69 U/L (ref 55–135)
ALT SERPL W/O P-5'-P-CCNC: 32 U/L (ref 10–44)
ANION GAP SERPL CALC-SCNC: 12 MMOL/L (ref 8–16)
AST SERPL-CCNC: 39 U/L (ref 10–40)
BASOPHILS # BLD AUTO: 0.03 K/UL (ref 0–0.2)
BASOPHILS NFR BLD: 0.5 % (ref 0–1.9)
BILIRUB SERPL-MCNC: 0.3 MG/DL (ref 0.1–1)
BLD GP AB SCN CELLS X3 SERPL QL: NORMAL
BUN SERPL-MCNC: 33 MG/DL (ref 10–30)
CALCIUM SERPL-MCNC: 9.6 MG/DL (ref 8.7–10.5)
CHLORIDE SERPL-SCNC: 103 MMOL/L (ref 95–110)
CO2 SERPL-SCNC: 26 MMOL/L (ref 23–29)
CREAT SERPL-MCNC: 1.2 MG/DL (ref 0.5–1.4)
DIFFERENTIAL METHOD BLD: ABNORMAL
EOSINOPHIL # BLD AUTO: 0.1 K/UL (ref 0–0.5)
EOSINOPHIL NFR BLD: 2.5 % (ref 0–8)
ERYTHROCYTE [DISTWIDTH] IN BLOOD BY AUTOMATED COUNT: 14.6 % (ref 11.5–14.5)
EST. GFR  (NO RACE VARIABLE): 57.1 ML/MIN/1.73 M^2
GLUCOSE SERPL-MCNC: 93 MG/DL (ref 70–110)
HCT VFR BLD AUTO: 30.4 % (ref 40–54)
HGB BLD-MCNC: 10.1 G/DL (ref 14–18)
IMM GRANULOCYTES # BLD AUTO: 0.01 K/UL (ref 0–0.04)
IMM GRANULOCYTES NFR BLD AUTO: 0.2 % (ref 0–0.5)
LYMPHOCYTES # BLD AUTO: 1.2 K/UL (ref 1–4.8)
LYMPHOCYTES NFR BLD: 21.1 % (ref 18–48)
MCH RBC QN AUTO: 30 PG (ref 27–31)
MCHC RBC AUTO-ENTMCNC: 33.2 G/DL (ref 32–36)
MCV RBC AUTO: 90 FL (ref 82–98)
MONOCYTES # BLD AUTO: 0.9 K/UL (ref 0.3–1)
MONOCYTES NFR BLD: 15.1 % (ref 4–15)
NEUTROPHILS # BLD AUTO: 3.5 K/UL (ref 1.8–7.7)
NEUTROPHILS NFR BLD: 60.6 % (ref 38–73)
NRBC BLD-RTO: 0 /100 WBC
PLATELET # BLD AUTO: 217 K/UL (ref 150–450)
PMV BLD AUTO: 9.9 FL (ref 9.2–12.9)
POTASSIUM SERPL-SCNC: 3.7 MMOL/L (ref 3.5–5.1)
PROT SERPL-MCNC: 7.6 G/DL (ref 6–8.4)
RBC # BLD AUTO: 3.37 M/UL (ref 4.6–6.2)
SODIUM SERPL-SCNC: 141 MMOL/L (ref 136–145)
SPECIMEN OUTDATE: NORMAL
TSH SERPL DL<=0.005 MIU/L-ACNC: 1.94 UIU/ML (ref 0.4–4)
WBC # BLD AUTO: 5.7 K/UL (ref 3.9–12.7)

## 2024-05-16 PROCEDURE — 99215 OFFICE O/P EST HI 40 MIN: CPT | Mod: S$GLB,,, | Performed by: PHYSICIAN ASSISTANT

## 2024-05-16 PROCEDURE — 96413 CHEMO IV INFUSION 1 HR: CPT

## 2024-05-16 PROCEDURE — 1125F AMNT PAIN NOTED PAIN PRSNT: CPT | Mod: CPTII,S$GLB,, | Performed by: PHYSICIAN ASSISTANT

## 2024-05-16 PROCEDURE — 1160F RVW MEDS BY RX/DR IN RCRD: CPT | Mod: CPTII,S$GLB,, | Performed by: PHYSICIAN ASSISTANT

## 2024-05-16 PROCEDURE — 25000003 PHARM REV CODE 250: Performed by: PHYSICIAN ASSISTANT

## 2024-05-16 PROCEDURE — 84443 ASSAY THYROID STIM HORMONE: CPT | Performed by: PHYSICIAN ASSISTANT

## 2024-05-16 PROCEDURE — 80053 COMPREHEN METABOLIC PANEL: CPT | Performed by: PHYSICIAN ASSISTANT

## 2024-05-16 PROCEDURE — 3288F FALL RISK ASSESSMENT DOCD: CPT | Mod: CPTII,S$GLB,, | Performed by: PHYSICIAN ASSISTANT

## 2024-05-16 PROCEDURE — 1101F PT FALLS ASSESS-DOCD LE1/YR: CPT | Mod: CPTII,S$GLB,, | Performed by: PHYSICIAN ASSISTANT

## 2024-05-16 PROCEDURE — 85025 COMPLETE CBC W/AUTO DIFF WBC: CPT | Performed by: PHYSICIAN ASSISTANT

## 2024-05-16 PROCEDURE — 63600175 PHARM REV CODE 636 W HCPCS: Mod: JZ,JG | Performed by: PHYSICIAN ASSISTANT

## 2024-05-16 PROCEDURE — 99999 PR PBB SHADOW E&M-EST. PATIENT-LVL IV: CPT | Mod: PBBFAC,,, | Performed by: PHYSICIAN ASSISTANT

## 2024-05-16 PROCEDURE — 86901 BLOOD TYPING SEROLOGIC RH(D): CPT | Performed by: PHYSICIAN ASSISTANT

## 2024-05-16 PROCEDURE — 1159F MED LIST DOCD IN RCRD: CPT | Mod: CPTII,S$GLB,, | Performed by: PHYSICIAN ASSISTANT

## 2024-05-16 PROCEDURE — G2211 COMPLEX E/M VISIT ADD ON: HCPCS | Mod: S$GLB,,, | Performed by: PHYSICIAN ASSISTANT

## 2024-05-16 RX ORDER — SODIUM CHLORIDE 0.9 % (FLUSH) 0.9 %
10 SYRINGE (ML) INJECTION
Status: DISCONTINUED | OUTPATIENT
Start: 2024-05-16 | End: 2024-05-16 | Stop reason: HOSPADM

## 2024-05-16 RX ORDER — EPINEPHRINE 0.3 MG/.3ML
0.3 INJECTION SUBCUTANEOUS ONCE AS NEEDED
Status: DISCONTINUED | OUTPATIENT
Start: 2024-05-16 | End: 2024-05-16 | Stop reason: HOSPADM

## 2024-05-16 RX ORDER — PROCHLORPERAZINE EDISYLATE 5 MG/ML
5 INJECTION INTRAMUSCULAR; INTRAVENOUS ONCE AS NEEDED
Status: DISCONTINUED | OUTPATIENT
Start: 2024-05-16 | End: 2024-05-16 | Stop reason: HOSPADM

## 2024-05-16 RX ORDER — PROCHLORPERAZINE EDISYLATE 5 MG/ML
5 INJECTION INTRAMUSCULAR; INTRAVENOUS ONCE AS NEEDED
Status: CANCELLED
Start: 2024-05-16

## 2024-05-16 RX ORDER — HYDROCODONE BITARTRATE AND ACETAMINOPHEN 5; 325 MG/1; MG/1
1 TABLET ORAL EVERY 6 HOURS PRN
Qty: 120 TABLET | Refills: 0 | Status: SHIPPED | OUTPATIENT
Start: 2024-05-16

## 2024-05-16 RX ORDER — EPINEPHRINE 0.3 MG/.3ML
0.3 INJECTION SUBCUTANEOUS ONCE AS NEEDED
Status: CANCELLED | OUTPATIENT
Start: 2024-05-16

## 2024-05-16 RX ORDER — IBUPROFEN 800 MG/1
800 TABLET ORAL EVERY 8 HOURS PRN
Qty: 90 TABLET | Refills: 2 | Status: SHIPPED | OUTPATIENT
Start: 2024-05-16 | End: 2025-05-16

## 2024-05-16 RX ORDER — SODIUM CHLORIDE 0.9 % (FLUSH) 0.9 %
10 SYRINGE (ML) INJECTION
Status: CANCELLED | OUTPATIENT
Start: 2024-05-16

## 2024-05-16 RX ORDER — DIPHENHYDRAMINE HYDROCHLORIDE 50 MG/ML
50 INJECTION INTRAMUSCULAR; INTRAVENOUS ONCE AS NEEDED
Status: DISCONTINUED | OUTPATIENT
Start: 2024-05-16 | End: 2024-05-16 | Stop reason: HOSPADM

## 2024-05-16 RX ORDER — DIPHENHYDRAMINE HYDROCHLORIDE 50 MG/ML
50 INJECTION INTRAMUSCULAR; INTRAVENOUS ONCE AS NEEDED
Status: CANCELLED | OUTPATIENT
Start: 2024-05-16

## 2024-05-16 RX ORDER — HEPARIN 100 UNIT/ML
500 SYRINGE INTRAVENOUS
Status: DISCONTINUED | OUTPATIENT
Start: 2024-05-16 | End: 2024-05-16 | Stop reason: HOSPADM

## 2024-05-16 RX ORDER — HEPARIN 100 UNIT/ML
500 SYRINGE INTRAVENOUS
Status: CANCELLED | OUTPATIENT
Start: 2024-05-16

## 2024-05-16 RX ADMIN — SODIUM CHLORIDE 400 MG: 9 INJECTION, SOLUTION INTRAVENOUS at 01:05

## 2024-05-16 RX ADMIN — SODIUM CHLORIDE: 9 INJECTION, SOLUTION INTRAVENOUS at 01:05

## 2024-05-16 NOTE — PROGRESS NOTES
MEDICAL ONCOLOGY - ESTABLISHED PATIENT VISIT    Reason for visit: Gastric Adenocarcinoma     Best Contact Phone Number(s): [phone] (home)      Cancer/Stage/TNM:    Cancer Staging   Malignant neoplasm of pyloric antrum  Staging form: Stomach, AJCC 8th Edition  - Clinical stage from 10/20/2023: Stage III (cT3, cN1, cM0) - Signed by Parish Steinberg MD on 11/3/2023       Oncology History   Malignant neoplasm of pyloric antrum   10/20/2023 Cancer Staged    Staging form: Stomach, AJCC 8th Edition  - Clinical stage from 10/20/2023: Stage III (cT3, cN1, cM0)     11/2/2023 Initial Diagnosis    Malignant neoplasm of pyloric antrum     11/21/2023 -  Chemotherapy    Treatment Summary   Plan Name: OP PEMBROLIZUMAB 400MG Q6W  Treatment Goal: Palliative  Status: Active  Start Date: 11/21/2023  End Date: 9/18/2025 (Planned)  Provider: Parish Steinberg MD  Chemotherapy: [No matching medication found in this treatment plan]          History:   89 y.o. male presents for evaluation of newly diagnosed gastric adenocarcinoma. He presented to the ED in September 2023 with weakness and was found to have a Hg of 4.2. He underwent and EGD which revealed an ulcerative mass, biopsies revealed high-grade dysplasia. Had an EUS on 10/20/23 which revealed an ulcerating prepyloric mass (T3, possible N1, M0), biopsy positive for invasic adenocarcinoma. HER2 negative but dMMR (loss of MLH1 and PMS2). NGS and pharmacogenomics were sent. CT CAP on 10/31/23 revealed a circumferential pre-pyloric/pyloric thickening, there are two areas of either extension of the primary mass or enarlged lymph nodes.     Interval History: Mr Chin returns to the clinic prior to cycle 5 of immunotherapy with pembrolizumab for his MSI-H gastric cancer. He feels well overall but he does report pain within his hands, shoulders. He has not fallen. He has been working with PT/OT that has helped some. He is also taking Motrin that helps. He reports an episode  of diarrhea, followed by nausea without vomiting. Overall, his stool has been fairly stable. He has been eating well and has a good appetite. Notices improvement in his lower extremity edema, mild hyperpigmentation in the legs.  No fevers, chills.     Patient presents with his family. Needs re-fill on Motrin and Norco  ECOG PS is 1.       ROS:   Review of Systems   Constitutional:  Negative for chills and fever.   HENT:  Negative for congestion and sore throat.    Eyes:  Negative for pain.   Respiratory:  Negative for cough and shortness of breath.    Cardiovascular:  Negative for chest pain, palpitations and leg swelling.   Gastrointestinal:  Positive for diarrhea (one episode) and heartburn. Negative for abdominal pain, nausea and vomiting.   Genitourinary:  Negative for dysuria.   Musculoskeletal:  Positive for joint pain and myalgias. Negative for back pain.   Neurological:  Positive for weakness (left side). Negative for dizziness and headaches.   Psychiatric/Behavioral:  Negative for suicidal ideas. The patient is not nervous/anxious.        Past Medical History:   Past Medical History:   Diagnosis Date    Arthritis     In back, neck    Bell's palsy feb or march 2013    Blood clot in vein     right leg after back surgery    Cataract associated with other syndromes     GERD (gastroesophageal reflux disease)     Hypertension     Other and unspecified hyperlipidemia     Stroke     TIA (transient ischemic attack)     Feb or march 2013        Past Surgical History:   Past Surgical History:   Procedure Laterality Date    APPENDECTOMY      BACK SURGERY      COLONOSCOPY N/A 9/29/2023    Procedure: COLONOSCOPY;  Surgeon: Jan Lozano MD;  Location: [facility];  Service: Gastroenterology;  Laterality: N/A;    ENDOSCOPIC ULTRASOUND OF UPPER GASTROINTESTINAL TRACT N/A 10/20/2023    Procedure: ULTRASOUND, UPPER GI TRACT, ENDOSCOPIC;  Surgeon: Reymundo Rangel MD;  Location: [facility];  Service: Endoscopy;   Laterality: N/A;    ESOPHAGOGASTRODUODENOSCOPY N/A 2023    Procedure: EGD (ESOPHAGOGASTRODUODENOSCOPY);  Surgeon: Jan Lozano MD;  Location: [facility];  Service: Gastroenterology;  Laterality: N/A;    ESOPHAGOGASTRODUODENOSCOPY N/A 10/20/2023    Procedure: EGD (ESOPHAGOGASTRODUODENOSCOPY);  Surgeon: Reymundo Rangel MD;  Location: [facility] ENDO;  Service: Endoscopy;  Laterality: N/A;  urgent EGD/EUS (radial) for gastric ulcerated mass (HGD)   Referring: Jan Lozano MD  10/17/23-Pt is no longer on Plavix, H/O CVA with left hemiparesis, instructions via portal-DS    PROSTATE SURGERY          Family History:   Family History   Problem Relation Name Age of Onset    Stroke Father      Hypertension Son          Social History:   Social History     Tobacco Use    Smoking status: Former     Current packs/day: 0.00     Types: Cigarettes     Quit date: 1955     Years since quittin.4    Smokeless tobacco: Not on file   Substance Use Topics    Alcohol use: No        I have reviewed and updated the patient's past medical, surgical, family and social histories.    Allergies:   Review of patient's allergies indicates:   Allergen Reactions    Prednisone         Medications:   Current Outpatient Medications   Medication Sig Dispense Refill    bumetanide (BUMEX) 1 MG tablet Take 1 mg by mouth once daily.      docusate sodium (COLACE) 100 MG capsule Take 1 capsule (100 mg total) by mouth 2 (two) times daily as needed (while taking pain medication). 20 capsule 0    hydroCHLOROthiazide (HYDRODIURIL) 25 MG tablet Take 25 mg by mouth once daily.      HYDROcodone-acetaminophen (NORCO) 5-325 mg per tablet Take 1 tablet by mouth every 6 (six) hours as needed for Pain. 11 tablet 0    irbesartan (AVAPRO) 300 MG tablet Take 300 mg by mouth once daily.      multivitamin-minerals-lutein (MULTIVITAMIN 50 PLUS) Tab Take 1 tablet by mouth once daily.      nebivolol (BYSTOLIC) 10 MG Tab Take 1 tablet (10 mg total)  "by mouth once daily. 30 tablet 11    omeprazole (PRILOSEC OTC) 20 MG tablet Take 2 tablets (40 mg total) by mouth once daily. 122 tablet 0    oxybutynin (DITROPAN-XL) 10 MG 24 hr tablet Take 10 mg by mouth once daily.      potassium chloride SA (KLOR-CON M20) 20 MEQ tablet Take 1 tablet (20 mEq total) by mouth once daily. 30 tablet 11    saw palmetto 500 MG capsule Take 500 mg by mouth once daily.      zinc gluconate 50 mg tablet Take 50 mg by mouth once daily.      amoxicillin (AMOXIL) 500 MG capsule Take 500 mg by mouth 2 (two) times daily. (Patient not taking: Reported on 5/16/2024)      atorvastatin (LIPITOR) 20 MG tablet Take 1 tablet (20 mg total) by mouth once daily. 30 tablet 1    clopidogrel (PLAVIX) 75 mg tablet Take 75 mg by mouth once daily. (Patient not taking: Reported on 5/16/2024)      ibuprofen (ADVIL,MOTRIN) 800 MG tablet Take 1 tablet (800 mg total) by mouth every 8 (eight) hours as needed for Pain. 90 tablet 2    promethazine-dextromethorphan (PROMETHAZINE-DM) 6.25-15 mg/5 mL Syrp Take 5 mLs by mouth every 6 (six) hours as needed. (Patient not taking: Reported on 5/16/2024)       No current facility-administered medications for this visit.        Physical Exam:   BP (!) 168/76 (BP Location: Left arm, Patient Position: Sitting, BP Method: Medium (Automatic)) Comment: has not taken blood pressure med today  Pulse 62   Temp 98.3 °F (36.8 °C) (Oral)   Ht 5' 8" (1.727 m)   Wt 90.6 kg (199 lb 13.6 oz)   SpO2 96%   BMI 30.39 kg/m²                Physical Exam  Constitutional:       Appearance: Normal appearance.   HENT:      Head: Normocephalic and atraumatic.      Mouth/Throat:      Mouth: Mucous membranes are moist.      Pharynx: Oropharynx is clear.   Eyes:      Extraocular Movements: Extraocular movements intact.      Pupils: Pupils are equal, round, and reactive to light.   Cardiovascular:      Rate and Rhythm: Normal rate and regular rhythm.      Pulses: Normal pulses.      Heart sounds: " Normal heart sounds.   Pulmonary:      Effort: Pulmonary effort is normal.      Breath sounds: Normal breath sounds.   Abdominal:      General: Abdomen is flat.      Palpations: Abdomen is soft.      Tenderness: There is no abdominal tenderness.   Musculoskeletal:         General: Normal range of motion.      Cervical back: Normal range of motion and neck supple.      Right lower leg: No edema.      Left lower leg: No edema.   Skin:     General: Skin is warm and dry.   Neurological:      General: No focal deficit present.      Mental Status: He is alert and oriented to person, place, and time.      Motor: Weakness (left side slightly weaker than right) present.   Psychiatric:         Mood and Affect: Mood normal.         Behavior: Behavior normal.           Labs:   Recent Results (from the past 48 hour(s))   CBC W/ AUTO DIFFERENTIAL    Collection Time: 05/16/24 11:01 AM   Result Value Ref Range    WBC 5.70 3.90 - 12.70 K/uL    RBC 3.37 (L) 4.60 - 6.20 M/uL    Hemoglobin 10.1 (L) 14.0 - 18.0 g/dL    Hematocrit 30.4 (L) 40.0 - 54.0 %    MCV 90 82 - 98 fL    MCH 30.0 27.0 - 31.0 pg    MCHC 33.2 32.0 - 36.0 g/dL    RDW 14.6 (H) 11.5 - 14.5 %    Platelets 217 150 - 450 K/uL    MPV 9.9 9.2 - 12.9 fL    Immature Granulocytes 0.2 0.0 - 0.5 %    Gran # (ANC) 3.5 1.8 - 7.7 K/uL    Immature Grans (Abs) 0.01 0.00 - 0.04 K/uL    Lymph # 1.2 1.0 - 4.8 K/uL    Mono # 0.9 0.3 - 1.0 K/uL    Eos # 0.1 0.0 - 0.5 K/uL    Baso # 0.03 0.00 - 0.20 K/uL    nRBC 0 0 /100 WBC    Gran % 60.6 38.0 - 73.0 %    Lymph % 21.1 18.0 - 48.0 %    Mono % 15.1 (H) 4.0 - 15.0 %    Eosinophil % 2.5 0.0 - 8.0 %    Basophil % 0.5 0.0 - 1.9 %    Differential Method Automated    Comprehensive Metabolic Panel    Collection Time: 05/16/24 11:01 AM   Result Value Ref Range    Sodium 141 136 - 145 mmol/L    Potassium 3.7 3.5 - 5.1 mmol/L    Chloride 103 95 - 110 mmol/L    CO2 26 23 - 29 mmol/L    Glucose 93 70 - 110 mg/dL    BUN 33 (H) 10 - 30 mg/dL    Creatinine  1.2 0.5 - 1.4 mg/dL    Calcium 9.6 8.7 - 10.5 mg/dL    Total Protein 7.6 6.0 - 8.4 g/dL    Albumin 3.8 3.5 - 5.2 g/dL    Total Bilirubin 0.3 0.1 - 1.0 mg/dL    Alkaline Phosphatase 69 55 - 135 U/L    AST 39 10 - 40 U/L    ALT 32 10 - 44 U/L    eGFR 57.1 (A) >60 mL/min/1.73 m^2    Anion Gap 12 8 - 16 mmol/L       Imagin/4/2024 CT CAP-    Impression:     Persistent thickening of the gastric antrum though decreased compared to prior with measurements as above.  Adjacent nodes also slightly decreased in size.     Hypodensity in the liver and kidneys likely cysts.     Prostate is heterogeneous.  Bladder is not distended which may account for the anterior bladder wall thickening.    10/31/23 CT CAP - Circumferential wall thickening at the pre-pyloric/pyloric region in this patient with known adenocarcinoma.  Area of nodular soft tissue excrescence versus two adjacent mildly enlarged nodes concerning for metastasis.    Path:   10/20/23 - Gastric biopsy with invasive adenocarcinoma. Loss of nuclear expression of MLH1 and PMS2.       Assessment:       1. Malignant neoplasm of pyloric antrum    2. Neoplastic (malignant) related fatigue    3. Iron deficiency anemia due to chronic blood loss    4. Anemia in neoplastic disease    5. Restless leg syndrome due to iron deficiency anemia    6. Cerebrovascular accident (CVA), unspecified mechanism    7. Other osteoarthritis of spine, cervical region    8. Muscle weakness    9. Neoplasm related pain            Plan:        # Malignant Neoplasm of Pyloric Antrum   Patient diagnosed with gastric adenocarcinoma on 10/20/23. No signs of metastasis on imaging. Patient is not a candidate for surgery or chemotherapy given his age and co-morbidities. His tumor was MSI-H (loss of nuclear expression of MLH1 and PMS2) so he is a candidate for immunotherapy. Spoke with patient and his family about the risks and benefits of immunotherapy and they are in agreement with starting it.      Began cycle 1 of q6 week Keytruda on 11/21/23.  Tolerating well. Eating well and has a good appetite  Anemia improving.  CT CAP results display improvement in the thickening of the gastric antrum and adjacent lymph nodes. Good response, recommend to c/w with Keytruda  Doing fairly. He does continue his work with PT/OT for restless leg syndrome and LE weakness. He does feel that it is a little better and improves with Motrin.   Lab work reviewed and adequate for treatment   Proceed with cycle 5 this week of Keytruda 400mg.    - RTC in 6 weeks for cycle 6. Will repeat imaging studies after cycle 6  .  # JOSE JUAN, anemia in neoplastic disease, neoplastic related fatigue, restless leg syndrome, diarrhea  - Secondary to blood loss from gastric mass. Improved.   - Received Injectafer.  - will monitor TSH while receiving Pembro    - BP elevated but he reports not taking his medication yet.     # CVA, hand and shoulder pain, neoplasm related pain  - some residual deficits but remains ambulatory with assistive devices at home.  - will consider PT. Placed orders for HH for OT/PT  - will re-fill Motrin and Norco today    Follow up: 6 weeks for cycle 6.     The above information has been reviewed with the patient and all questions have been answered to their apparent satisfaction.  They understand that they can call the clinic with any questions.      Route Chart for Scheduling    Med Onc Chart Routing      Follow up with physician 6 weeks. See Dr Steinberg in 6 weeks as scheduled. See Dr Steinberg in 12 weeks for Pembro   Follow up with LANDON . See LANDON in 18 weeks for Pembro   Infusion scheduling note    Injection scheduling note    Labs CBC, CMP and TSH   Scheduling:  Preferred lab:  Lab interval: every 6 weeks     Imaging    Pharmacy appointment    Other referrals                  Treatment Plan Information   OP PEMBROLIZUMAB 400MG Q6W   Parish Steinberg MD   Upcoming Treatment Dates - OP PEMBROLIZUMAB 400MG Q6W    5/2/2024        Chemotherapy       pembrolizumab (KEYTRUDA) 400 mg in sodium chloride 0.9% SolP 116 mL infusion  6/13/2024       Chemotherapy       pembrolizumab (KEYTRUDA) 400 mg in sodium chloride 0.9% SolP 116 mL infusion  7/25/2024       Chemotherapy       pembrolizumab (KEYTRUDA) 400 mg in sodium chloride 0.9% SolP 116 mL infusion  9/5/2024       Chemotherapy       pembrolizumab (KEYTRUDA) 400 mg in sodium chloride 0.9% SolP 116 mL infusion    Therapy Plan Information  EPINEPHrine (EPIPEN) 0.3 mg/0.3 mL pen injection 0.3 mg  0.3 mg, Intramuscular, PRN  diphenhydrAMINE injection 50 mg  50 mg, Intravenous, PRN  hydrocortisone sodium succinate injection 100 mg  100 mg, Intravenous, PRN

## 2024-05-16 NOTE — PLAN OF CARE
Problem: Chemotherapy Effects  Goal: Anemia Symptom Improvement  Outcome: Progressing  Goal: Safety Maintained  Outcome: Progressing  Goal: Absence of Hematuria  Outcome: Progressing  Goal: Nausea and Vomiting Relief  Outcome: Progressing  Goal: Neurotoxicity Symptom Control  Outcome: Progressing  Goal: Absence of Infection  Outcome: Progressing  Goal: Absence of Bleeding  Outcome: Progressing     Problem: Fatigue  Goal: Improved Activity Tolerance  Outcome: Progressing     Problem: Anemia  Goal: Anemia Symptom Improvement  Outcome: Progressing     Problem: Infection  Goal: Absence of Infection Signs and Symptoms  Outcome: Progressing  Pt completed C5 of Keytruda tx via PIV without adverse effects BP elevated d/t pt hadn't taken BP meds. Discharged AAOX4 in W/C escorted by family

## 2024-06-23 ENCOUNTER — HOSPITAL ENCOUNTER (EMERGENCY)
Facility: HOSPITAL | Age: 89
Discharge: HOME | End: 2024-06-23
Attending: EMERGENCY MEDICINE
Payer: COMMERCIAL

## 2024-06-23 VITALS
TEMPERATURE: 98 F | RESPIRATION RATE: 18 BRPM | DIASTOLIC BLOOD PRESSURE: 62 MMHG | WEIGHT: 209 LBS | BODY MASS INDEX: 31.67 KG/M2 | HEIGHT: 68 IN | HEART RATE: 71 BPM | SYSTOLIC BLOOD PRESSURE: 121 MMHG | OXYGEN SATURATION: 98 %

## 2024-06-23 DIAGNOSIS — K04.7 DENTAL INFECTION: Primary | ICD-10-CM

## 2024-06-23 PROCEDURE — 25000003 PHARM REV CODE 250: Mod: ER | Performed by: EMERGENCY MEDICINE

## 2024-06-23 PROCEDURE — 99283 EMERGENCY DEPT VISIT LOW MDM: CPT | Mod: ER

## 2024-06-23 RX ORDER — AMOXICILLIN AND CLAVULANATE POTASSIUM 400; 57 MG/5ML; MG/5ML
800 POWDER, FOR SUSPENSION ORAL 2 TIMES DAILY
Qty: 140 ML | Refills: 0 | Status: SHIPPED | OUTPATIENT
Start: 2024-06-23 | End: 2024-06-30

## 2024-06-23 RX ORDER — AMOXICILLIN AND CLAVULANATE POTASSIUM 400; 57 MG/5ML; MG/5ML
800 POWDER, FOR SUSPENSION ORAL
Status: COMPLETED | OUTPATIENT
Start: 2024-06-23 | End: 2024-06-23

## 2024-06-23 RX ADMIN — AMOXICILLIN AND CLAVULANATE POTASSIUM 800 MG: 400; 57 POWDER, FOR SUSPENSION ORAL at 07:06

## 2024-06-24 NOTE — ED PROVIDER NOTES
Encounter Date: 6/23/2024       History     Chief Complaint   Patient presents with    Dental Problem     Lower left dental pain x 3 weeks. Patient began experiencing swelling to left jaw line and left lower lip yesterday.      HPI  89 y.o.  The patient complains of a many days of left lower dental pain, no obvious overt trauma.  The dental area is tender to chewing.  There is no difficulty speaking, breathing, swallowing.  There has been no recent dental follow up.    Review of patient's allergies indicates:   Allergen Reactions    Prednisone      Past Medical History:   Diagnosis Date    Arthritis     In back, neck    Bell's palsy feb or march 2013    Blood clot in vein     right leg after back surgery    Cataract associated with other syndromes     GERD (gastroesophageal reflux disease)     Hypertension     Other and unspecified hyperlipidemia     Stroke     TIA (transient ischemic attack)     Feb or march 2013     Past Surgical History:   Procedure Laterality Date    APPENDECTOMY      BACK SURGERY      COLONOSCOPY N/A 9/29/2023    Procedure: COLONOSCOPY;  Surgeon: Jan Lozano MD;  Location: [facility];  Service: Gastroenterology;  Laterality: N/A;    ENDOSCOPIC ULTRASOUND OF UPPER GASTROINTESTINAL TRACT N/A 10/20/2023    Procedure: ULTRASOUND, UPPER GI TRACT, ENDOSCOPIC;  Surgeon: Reymundo Rangel MD;  Location: [facility];  Service: Endoscopy;  Laterality: N/A;    ESOPHAGOGASTRODUODENOSCOPY N/A 9/29/2023    Procedure: EGD (ESOPHAGOGASTRODUODENOSCOPY);  Surgeon: Jan Lozano MD;  Location: [facility];  Service: Gastroenterology;  Laterality: N/A;    ESOPHAGOGASTRODUODENOSCOPY N/A 10/20/2023    Procedure: EGD (ESOPHAGOGASTRODUODENOSCOPY);  Surgeon: Reymundo Rangel MD;  Location: [facility];  Service: Endoscopy;  Laterality: N/A;  urgent EGD/EUS (radial) for gastric ulcerated mass (HGD)   Referring: Jan Lozano MD  10/17/23-Pt is no longer on Plavix, H/O CVA with left  hemiparesis, instructions via portal-DS    PROSTATE SURGERY       Family History   Problem Relation Name Age of Onset    Stroke Father      Hypertension Son       Social History     Tobacco Use    Smoking status: Former     Current packs/day: 0.00     Types: Cigarettes     Quit date: 1955     Years since quittin.5   Substance Use Topics    Alcohol use: No     Review of Systems  All systems were reviewed/examined and were negative except as noted in the HPI.    Physical Exam     Initial Vitals   BP Pulse Resp Temp SpO2   24 1814 24 1814 24 1814 24 1814 24 1817   127/61 75 20 98.2 °F (36.8 °C) 97 %      MAP       --                Physical Exam    General: the patient is awake, alert, and in no apparent distress.  Dental: dentition is ok but TTP L lower, no sig trismus, floor of the mouth is soft, submandibular area shows no sign's of Ever's angina/spreading infection  Head: normocephalic and atraumatic, sclera are clear  Neck: supple without meningismus  Chest: no respiratory distress  Heart: regular rate and rhythm  Extremities: warm and well perfused  Skin: warm and dry  Psych conversant  Neuro: awake, alert, moving all extremities    ED Course   Procedures  Labs Reviewed - No data to display       Imaging Results    None          Medications   amoxicillin-clavulanate 400-57 mg/5 mL suspension 800 mg (has no administration in time range)     Medical Decision Making  Risk  Prescription drug management.       Medical Decision Making:    This is an emergent evaluation of a patient presenting to the ED.  Nursing notes were reviewed.    I decided to obtain and review old medical records, which showed: outpatient    Evaluation for Emergency Medical Condition  The patient received a medical screening exam and within a reasonable degree of clinical confidence an emergency medical condition has not been identified.  The patient is instructed on proper follow up and return precautions  to the ED.    Abx  Dental fu    Mathew Fleming MD, AGNIESZKA                                 Clinical Impression:  Final diagnoses:  [K04.7] Dental infection (Primary)          ED Disposition Condition    Discharge Stable          ED Prescriptions       Medication Sig Dispense Start Date End Date Auth. Provider    amoxicillin-clavulanate (AUGMENTIN) 400-57 mg/5 mL SusR Take 10 mLs (800 mg total) by mouth 2 (two) times daily. for 7 days 140 mL 6/23/2024 6/30/2024 Lui Fleming MD          Follow-up Information       Follow up With Specialties Details Why Contact Info    Norman Rose MD Family Medicine Schedule an appointment as soon as possible for a visit   [address] [facility] [address] [phone]            Discharged to home in stable condition, return to ED warnings given, follow up and patient care instructions given.      Mathew Fleming MD, AGNIESZKA, FACEP  Department of Emergency Medicine       Lui Fleming MD  06/24/24 3150

## 2024-06-26 ENCOUNTER — TELEPHONE (OUTPATIENT)
Dept: HEMATOLOGY/ONCOLOGY | Facility: CLINIC | Age: 89
End: 2024-06-26
Payer: COMMERCIAL

## 2024-06-26 NOTE — TELEPHONE ENCOUNTER
"----- Message from Morgan Sanders sent at 6/26/2024  2:45 PM CDT -----  Reschedule Existing Appointment    Appt Date: 6/27    Type of appt: NFL; F/u; Infusion    Physician: Maryan    Reason for rescheduling? Pt is currently admitted due to an infection in his mouth from his tube;  Requesting to r/s for soonest available    Caller: AlbinoTiffanie (Daughter)    Contact Preference: [phone]    Additional Information:  "Thank you for all that you do for our patients"  "

## 2024-06-27 ENCOUNTER — TELEPHONE (OUTPATIENT)
Dept: HEMATOLOGY/ONCOLOGY | Facility: CLINIC | Age: 89
End: 2024-06-27
Payer: COMMERCIAL

## 2024-06-27 NOTE — TELEPHONE ENCOUNTER
----- Message from Destiney Stevens RN sent at 6/27/2024  3:05 PM CDT -----  Regarding: FW: Returning a Missed Call  Contact: Tiffanie Posada (Daughter    ----- Message -----  From: Hellen Herrera  Sent: 6/27/2024   2:34 PM CDT  To: Maryan Lugo Staff  Subject: Returning a Missed Call                          Returning a Missed Call    Caller: Tiffanie Posada (Daughter      Returning call to: [facility]       Caller can be reached @: [phone]    Nature of the call: Patient's daughter returning call to [facility] regarding appt to be rescheduled for July and is requesting July 11th if it is still available.

## 2024-06-27 NOTE — TELEPHONE ENCOUNTER
Patient is not coming to his appointment today due to a dental infection. Will resume his treatments in August

## 2024-07-10 ENCOUNTER — TELEPHONE (OUTPATIENT)
Dept: HEMATOLOGY/ONCOLOGY | Facility: CLINIC | Age: 89
End: 2024-07-10
Payer: COMMERCIAL

## 2024-07-10 NOTE — TELEPHONE ENCOUNTER
Secure chat received from Teresa Lozano RN in infusion that patient is scheduled for 2 pm keytruda infusion tomorrow.  Called patient and left vm.  Called daughter and confirmed appointment dates and times for tomorrow.  Patient's daughter verbalized understanding.

## 2024-07-11 ENCOUNTER — LAB VISIT (OUTPATIENT)
Dept: LAB | Facility: HOSPITAL | Age: 89
End: 2024-07-11
Payer: COMMERCIAL

## 2024-07-11 ENCOUNTER — OFFICE VISIT (OUTPATIENT)
Dept: HEMATOLOGY/ONCOLOGY | Facility: CLINIC | Age: 89
End: 2024-07-11
Payer: COMMERCIAL

## 2024-07-11 ENCOUNTER — INFUSION (OUTPATIENT)
Dept: INFUSION THERAPY | Facility: HOSPITAL | Age: 89
End: 2024-07-11
Payer: COMMERCIAL

## 2024-07-11 VITALS
HEART RATE: 64 BPM | DIASTOLIC BLOOD PRESSURE: 67 MMHG | WEIGHT: 201.25 LBS | HEIGHT: 68 IN | TEMPERATURE: 99 F | OXYGEN SATURATION: 99 % | HEART RATE: 64 BPM | BODY MASS INDEX: 30.5 KG/M2 | RESPIRATION RATE: 20 BRPM | OXYGEN SATURATION: 99 % | SYSTOLIC BLOOD PRESSURE: 153 MMHG | SYSTOLIC BLOOD PRESSURE: 153 MMHG | DIASTOLIC BLOOD PRESSURE: 67 MMHG | TEMPERATURE: 99 F | HEIGHT: 68 IN | BODY MASS INDEX: 30.5 KG/M2 | WEIGHT: 201.25 LBS

## 2024-07-11 DIAGNOSIS — I63.9 CEREBROVASCULAR ACCIDENT (CVA), UNSPECIFIED MECHANISM: ICD-10-CM

## 2024-07-11 DIAGNOSIS — D50.0 IRON DEFICIENCY ANEMIA DUE TO CHRONIC BLOOD LOSS: ICD-10-CM

## 2024-07-11 DIAGNOSIS — R53.0 NEOPLASTIC (MALIGNANT) RELATED FATIGUE: ICD-10-CM

## 2024-07-11 DIAGNOSIS — C16.3 MALIGNANT NEOPLASM OF PYLORIC ANTRUM: Primary | ICD-10-CM

## 2024-07-11 DIAGNOSIS — C16.3 MALIGNANT NEOPLASM OF PYLORIC ANTRUM: ICD-10-CM

## 2024-07-11 DIAGNOSIS — M47.892 OTHER OSTEOARTHRITIS OF SPINE, CERVICAL REGION: ICD-10-CM

## 2024-07-11 DIAGNOSIS — D63.0 ANEMIA IN NEOPLASTIC DISEASE: ICD-10-CM

## 2024-07-11 DIAGNOSIS — M25.542 ARTHRALGIA OF BOTH HANDS: ICD-10-CM

## 2024-07-11 DIAGNOSIS — M25.541 ARTHRALGIA OF BOTH HANDS: ICD-10-CM

## 2024-07-11 LAB
ALBUMIN SERPL BCP-MCNC: 3.5 G/DL (ref 3.5–5.2)
ALP SERPL-CCNC: 77 U/L (ref 55–135)
ALT SERPL W/O P-5'-P-CCNC: 36 U/L (ref 10–44)
ANION GAP SERPL CALC-SCNC: 10 MMOL/L (ref 8–16)
AST SERPL-CCNC: 43 U/L (ref 10–40)
BILIRUB SERPL-MCNC: 0.3 MG/DL (ref 0.1–1)
BUN SERPL-MCNC: 35 MG/DL (ref 10–30)
CALCIUM SERPL-MCNC: 9.2 MG/DL (ref 8.7–10.5)
CHLORIDE SERPL-SCNC: 104 MMOL/L (ref 95–110)
CO2 SERPL-SCNC: 25 MMOL/L (ref 23–29)
CREAT SERPL-MCNC: 1.6 MG/DL (ref 0.5–1.4)
ERYTHROCYTE [DISTWIDTH] IN BLOOD BY AUTOMATED COUNT: 14.5 % (ref 11.5–14.5)
EST. GFR  (NO RACE VARIABLE): 40.4 ML/MIN/1.73 M^2
GLUCOSE SERPL-MCNC: 108 MG/DL (ref 70–110)
HCT VFR BLD AUTO: 33 % (ref 40–54)
HGB BLD-MCNC: 10.3 G/DL (ref 14–18)
IMM GRANULOCYTES # BLD AUTO: 0.02 K/UL (ref 0–0.04)
MCH RBC QN AUTO: 28.1 PG (ref 27–31)
MCHC RBC AUTO-ENTMCNC: 31.2 G/DL (ref 32–36)
MCV RBC AUTO: 90 FL (ref 82–98)
NEUTROPHILS # BLD AUTO: 2.6 K/UL (ref 1.8–7.7)
PLATELET # BLD AUTO: 222 K/UL (ref 150–450)
PMV BLD AUTO: 9.9 FL (ref 9.2–12.9)
POTASSIUM SERPL-SCNC: 3.7 MMOL/L (ref 3.5–5.1)
PROT SERPL-MCNC: 7.3 G/DL (ref 6–8.4)
RBC # BLD AUTO: 3.67 M/UL (ref 4.6–6.2)
SODIUM SERPL-SCNC: 139 MMOL/L (ref 136–145)
TSH SERPL DL<=0.005 MIU/L-ACNC: 1.48 UIU/ML (ref 0.4–4)
WBC # BLD AUTO: 4.7 K/UL (ref 3.9–12.7)

## 2024-07-11 PROCEDURE — 96413 CHEMO IV INFUSION 1 HR: CPT

## 2024-07-11 PROCEDURE — 80053 COMPREHEN METABOLIC PANEL: CPT | Performed by: INTERNAL MEDICINE

## 2024-07-11 PROCEDURE — 99999 PR PBB SHADOW E&M-EST. PATIENT-LVL IV: CPT | Mod: PBBFAC,,, | Performed by: INTERNAL MEDICINE

## 2024-07-11 PROCEDURE — 63600175 PHARM REV CODE 636 W HCPCS: Mod: JZ,JG | Performed by: INTERNAL MEDICINE

## 2024-07-11 PROCEDURE — 36415 COLL VENOUS BLD VENIPUNCTURE: CPT | Performed by: INTERNAL MEDICINE

## 2024-07-11 PROCEDURE — 85027 COMPLETE CBC AUTOMATED: CPT | Performed by: INTERNAL MEDICINE

## 2024-07-11 PROCEDURE — 25000003 PHARM REV CODE 250: Performed by: INTERNAL MEDICINE

## 2024-07-11 PROCEDURE — 84443 ASSAY THYROID STIM HORMONE: CPT | Performed by: INTERNAL MEDICINE

## 2024-07-11 RX ORDER — EPINEPHRINE 0.3 MG/.3ML
0.3 INJECTION SUBCUTANEOUS ONCE AS NEEDED
Status: DISCONTINUED | OUTPATIENT
Start: 2024-07-11 | End: 2024-07-11 | Stop reason: HOSPADM

## 2024-07-11 RX ORDER — PROCHLORPERAZINE EDISYLATE 5 MG/ML
5 INJECTION INTRAMUSCULAR; INTRAVENOUS ONCE AS NEEDED
Status: DISCONTINUED | OUTPATIENT
Start: 2024-07-11 | End: 2024-07-11 | Stop reason: HOSPADM

## 2024-07-11 RX ORDER — SODIUM CHLORIDE 0.9 % (FLUSH) 0.9 %
10 SYRINGE (ML) INJECTION
Status: CANCELLED | OUTPATIENT
Start: 2024-07-11

## 2024-07-11 RX ORDER — PROCHLORPERAZINE EDISYLATE 5 MG/ML
5 INJECTION INTRAMUSCULAR; INTRAVENOUS ONCE AS NEEDED
Status: CANCELLED
Start: 2024-07-11

## 2024-07-11 RX ORDER — HEPARIN 100 UNIT/ML
500 SYRINGE INTRAVENOUS
Status: CANCELLED | OUTPATIENT
Start: 2024-07-11

## 2024-07-11 RX ORDER — DIPHENHYDRAMINE HYDROCHLORIDE 50 MG/ML
50 INJECTION INTRAMUSCULAR; INTRAVENOUS ONCE AS NEEDED
Status: DISCONTINUED | OUTPATIENT
Start: 2024-07-11 | End: 2024-07-11 | Stop reason: HOSPADM

## 2024-07-11 RX ORDER — DIPHENHYDRAMINE HYDROCHLORIDE 50 MG/ML
50 INJECTION INTRAMUSCULAR; INTRAVENOUS ONCE AS NEEDED
Status: CANCELLED | OUTPATIENT
Start: 2024-07-11

## 2024-07-11 RX ORDER — EPINEPHRINE 0.3 MG/.3ML
0.3 INJECTION SUBCUTANEOUS ONCE AS NEEDED
Status: CANCELLED | OUTPATIENT
Start: 2024-07-11

## 2024-07-11 RX ORDER — SODIUM CHLORIDE 0.9 % (FLUSH) 0.9 %
10 SYRINGE (ML) INJECTION
Status: DISCONTINUED | OUTPATIENT
Start: 2024-07-11 | End: 2024-07-11 | Stop reason: HOSPADM

## 2024-07-11 RX ORDER — HEPARIN 100 UNIT/ML
500 SYRINGE INTRAVENOUS
Status: DISCONTINUED | OUTPATIENT
Start: 2024-07-11 | End: 2024-07-11 | Stop reason: HOSPADM

## 2024-07-11 RX ADMIN — SODIUM CHLORIDE 400 MG: 9 INJECTION, SOLUTION INTRAVENOUS at 03:07

## 2024-07-11 RX ADMIN — SODIUM CHLORIDE: 9 INJECTION, SOLUTION INTRAVENOUS at 02:07

## 2024-07-11 NOTE — NURSING
PT tolerated Keytruda infusion well. No adverse reaction noted. Pt education reinforced on side effects, what to expect and when to contact the doctor. Pt verbalized understanding. PIV d/c per protocol, pt tolerated well.

## 2024-07-11 NOTE — PROGRESS NOTES
MEDICAL ONCOLOGY - ESTABLISHED PATIENT VISIT    Reason for visit: Gastric Adenocarcinoma     Best Contact Phone Number(s): [phone] (home)      Cancer/Stage/TNM:    Cancer Staging   Malignant neoplasm of pyloric antrum  Staging form: Stomach, AJCC 8th Edition  - Clinical stage from 10/20/2023: Stage III (cT3, cN1, cM0) - Signed by Parish Steinberg MD on 11/3/2023       Oncology History   Malignant neoplasm of pyloric antrum   10/20/2023 Cancer Staged    Staging form: Stomach, AJCC 8th Edition  - Clinical stage from 10/20/2023: Stage III (cT3, cN1, cM0)     11/2/2023 Initial Diagnosis    Malignant neoplasm of pyloric antrum     11/21/2023 -  Chemotherapy    Treatment Summary   Plan Name: OP PEMBROLIZUMAB 400MG Q6W  Treatment Goal: Palliative  Status: Active  Start Date: 11/21/2023  End Date: 9/18/2025 (Planned)  Provider: Parihs Steinberg MD  Chemotherapy: [No matching medication found in this treatment plan]          History:   89 y.o. male presents for evaluation of newly diagnosed gastric adenocarcinoma. He presented to the ED in September 2023 with weakness and was found to have a Hg of 4.2. He underwent and EGD which revealed an ulcerative mass, biopsies revealed high-grade dysplasia. Had an EUS on 10/20/23 which revealed an ulcerating prepyloric mass (T3, possible N1, M0), biopsy positive for invasic adenocarcinoma. HER2 negative but dMMR (loss of MLH1 and PMS2). NGS and pharmacogenomics were sent. CT CAP on 10/31/23 revealed a circumferential pre-pyloric/pyloric thickening, there are two areas of either extension of the primary mass or enarlged lymph nodes.     Interval History: Mr Chin returns to the clinic prior to cycle 6 of immunotherapy with pembrolizumab for his MSI-H gastric cancer. Continues with muscle cramping and pain in his hands and shoulders.  This has been ongoing for about four months or so.  No similar issues in his legs or feet.  Recently completed abx for a dental infection.   Will get his tooth pulled in a couple weeks.  Stable lower extremity edema.    Patient presents with his family.   ECOG PS is 1.       ROS:   Review of Systems   Constitutional:  Negative for chills and fever.   HENT:  Negative for congestion and sore throat.    Eyes:  Negative for pain.   Respiratory:  Negative for cough and shortness of breath.    Cardiovascular:  Negative for chest pain, palpitations and leg swelling.   Gastrointestinal:  Negative for abdominal pain, diarrhea, heartburn, nausea and vomiting.   Genitourinary:  Negative for dysuria.   Musculoskeletal:  Positive for joint pain and myalgias. Negative for back pain.   Neurological:  Positive for weakness (left side). Negative for dizziness and headaches.   Psychiatric/Behavioral:  Negative for suicidal ideas. The patient is not nervous/anxious.        Past Medical History:   Past Medical History:   Diagnosis Date    Arthritis     In back, neck    Bell's palsy feb or march 2013    Blood clot in vein     right leg after back surgery    Cataract associated with other syndromes     GERD (gastroesophageal reflux disease)     Hypertension     Other and unspecified hyperlipidemia     Stroke     TIA (transient ischemic attack)     Feb or march 2013        Past Surgical History:   Past Surgical History:   Procedure Laterality Date    APPENDECTOMY      BACK SURGERY      COLONOSCOPY N/A 9/29/2023    Procedure: COLONOSCOPY;  Surgeon: Jan Lozano MD;  Location: [facility];  Service: Gastroenterology;  Laterality: N/A;    ENDOSCOPIC ULTRASOUND OF UPPER GASTROINTESTINAL TRACT N/A 10/20/2023    Procedure: ULTRASOUND, UPPER GI TRACT, ENDOSCOPIC;  Surgeon: Ryemundo Rangel MD;  Location: [facility];  Service: Endoscopy;  Laterality: N/A;    ESOPHAGOGASTRODUODENOSCOPY N/A 9/29/2023    Procedure: EGD (ESOPHAGOGASTRODUODENOSCOPY);  Surgeon: Jan Lozano MD;  Location: [facility];  Service: Gastroenterology;  Laterality: N/A;     ESOPHAGOGASTRODUODENOSCOPY N/A 10/20/2023    Procedure: EGD (ESOPHAGOGASTRODUODENOSCOPY);  Surgeon: Reymundo Rangel MD;  Location: [facility];  Service: Endoscopy;  Laterality: N/A;  urgent EGD/EUS (radial) for gastric ulcerated mass (HGD)   Referring: Jan Lozano MD  10/17/23-Pt is no longer on Plavix, H/O CVA with left hemiparesis, instructions via portal-DS    PROSTATE SURGERY          Family History:   Family History   Problem Relation Name Age of Onset    Stroke Father      Hypertension Son          Social History:   Social History     Tobacco Use    Smoking status: Former     Current packs/day: 0.00     Types: Cigarettes     Quit date: 1955     Years since quittin.5    Smokeless tobacco: Not on file   Substance Use Topics    Alcohol use: No        I have reviewed and updated the patient's past medical, surgical, family and social histories.    Allergies:   Review of patient's allergies indicates:   Allergen Reactions    Prednisone         Medications:   Current Outpatient Medications   Medication Sig Dispense Refill    bumetanide (BUMEX) 1 MG tablet Take 1 mg by mouth once daily.      docusate sodium (COLACE) 100 MG capsule Take 1 capsule (100 mg total) by mouth 2 (two) times daily as needed (while taking pain medication). 20 capsule 0    hydroCHLOROthiazide (HYDRODIURIL) 25 MG tablet Take 25 mg by mouth once daily.      HYDROcodone-acetaminophen (NORCO) 5-325 mg per tablet Take 1 tablet by mouth every 6 (six) hours as needed for Pain. 120 tablet 0    ibuprofen (ADVIL,MOTRIN) 800 MG tablet Take 1 tablet (800 mg total) by mouth every 8 (eight) hours as needed for Pain. 90 tablet 2    irbesartan (AVAPRO) 300 MG tablet Take 300 mg by mouth once daily.      multivitamin-minerals-lutein (MULTIVITAMIN 50 PLUS) Tab Take 1 tablet by mouth once daily.      nebivolol (BYSTOLIC) 10 MG Tab Take 1 tablet (10 mg total) by mouth once daily. 30 tablet 11    oxybutynin (DITROPAN-XL) 10 MG 24 hr tablet  Take 10 mg by mouth once daily.      potassium chloride SA (KLOR-CON M20) 20 MEQ tablet Take 1 tablet (20 mEq total) by mouth once daily. 30 tablet 11    saw palmetto 500 MG capsule Take 500 mg by mouth once daily.      zinc gluconate 50 mg tablet Take 50 mg by mouth once daily.      amoxicillin (AMOXIL) 500 MG capsule Take 500 mg by mouth 2 (two) times daily. (Patient not taking: Reported on 5/16/2024)      atorvastatin (LIPITOR) 20 MG tablet Take 1 tablet (20 mg total) by mouth once daily. 30 tablet 1    omeprazole (PRILOSEC OTC) 20 MG tablet Take 2 tablets (40 mg total) by mouth once daily. 122 tablet 0    promethazine-dextromethorphan (PROMETHAZINE-DM) 6.25-15 mg/5 mL Syrp Take 5 mLs by mouth every 6 (six) hours as needed. (Patient not taking: Reported on 5/16/2024)       No current facility-administered medications for this visit.     Facility-Administered Medications Ordered in Other Visits   Medication Dose Route Frequency Provider Last Rate Last Admin    alteplase injection 2 mg  2 mg Intra-Catheter PRN Parish Steinberg MD        diphenhydrAMINE injection 50 mg  50 mg Intravenous Once PRN Parish Steinberg MD        EPINEPHrine (EPIPEN) 0.3 mg/0.3 mL pen injection 0.3 mg  0.3 mg Intramuscular Once PRN Parish Steinberg MD        heparin, porcine (PF) 100 unit/mL injection flush 500 Units  500 Units Intravenous PRN Parish Steinberg MD        hydrocortisone sodium succinate injection 100 mg  100 mg Intravenous Once PRN Parish Steinberg MD        pembrolizumab (KEYTRUDA) 400 mg in 0.9% NaCl SolP 131 mL infusion  400 mg Intravenous 1 time in Clinic/HOD Parish Steinberg MD        prochlorperazine injection Soln 5 mg  5 mg Intravenous Once PRN Parish Steinberg MD        sodium chloride 0.9% flush 10 mL  10 mL Intravenous PRN Parish Steinberg MD            Physical Exam:   BP (!) 153/67 (BP Location: Left arm, Patient Position: Sitting, BP Method: Medium (Automatic))   Pulse  "64   Temp 98.6 °F (37 °C) (Temporal)   Ht 5' 8" (1.727 m)   Wt 91.3 kg (201 lb 4.5 oz)   SpO2 99%   BMI 30.60 kg/m²                Physical Exam  Constitutional:       Appearance: Normal appearance.   HENT:      Head: Normocephalic and atraumatic.      Mouth/Throat:      Mouth: Mucous membranes are moist.      Pharynx: Oropharynx is clear.   Eyes:      Extraocular Movements: Extraocular movements intact.      Pupils: Pupils are equal, round, and reactive to light.   Cardiovascular:      Rate and Rhythm: Normal rate and regular rhythm.      Pulses: Normal pulses.      Heart sounds: Normal heart sounds.   Pulmonary:      Effort: Pulmonary effort is normal.      Breath sounds: Normal breath sounds.   Abdominal:      General: Abdomen is flat.      Palpations: Abdomen is soft.      Tenderness: There is no abdominal tenderness.   Musculoskeletal:         General: Normal range of motion.      Cervical back: Normal range of motion and neck supple.      Right lower leg: No edema.      Left lower leg: No edema.   Skin:     General: Skin is warm and dry.   Neurological:      General: No focal deficit present.      Mental Status: He is alert and oriented to person, place, and time.      Motor: Weakness (left side slightly weaker than right) present.   Psychiatric:         Mood and Affect: Mood normal.         Behavior: Behavior normal.           Labs:   Recent Results (from the past 48 hour(s))   CBC Oncology    Collection Time: 07/11/24 12:30 PM   Result Value Ref Range    WBC 4.70 3.90 - 12.70 K/uL    RBC 3.67 (L) 4.60 - 6.20 M/uL    Hemoglobin 10.3 (L) 14.0 - 18.0 g/dL    Hematocrit 33.0 (L) 40.0 - 54.0 %    MCV 90 82 - 98 fL    MCH 28.1 27.0 - 31.0 pg    MCHC 31.2 (L) 32.0 - 36.0 g/dL    RDW 14.5 11.5 - 14.5 %    Platelets 222 150 - 450 K/uL    MPV 9.9 9.2 - 12.9 fL    Gran # (ANC) 2.6 1.8 - 7.7 K/uL    Immature Grans (Abs) 0.02 0.00 - 0.04 K/uL   Comprehensive Metabolic Panel    Collection Time: 07/11/24 12:30 PM "   Result Value Ref Range    Sodium 139 136 - 145 mmol/L    Potassium 3.7 3.5 - 5.1 mmol/L    Chloride 104 95 - 110 mmol/L    CO2 25 23 - 29 mmol/L    Glucose 108 70 - 110 mg/dL    BUN 35 (H) 10 - 30 mg/dL    Creatinine 1.6 (H) 0.5 - 1.4 mg/dL    Calcium 9.2 8.7 - 10.5 mg/dL    Total Protein 7.3 6.0 - 8.4 g/dL    Albumin 3.5 3.5 - 5.2 g/dL    Total Bilirubin 0.3 0.1 - 1.0 mg/dL    Alkaline Phosphatase 77 55 - 135 U/L    AST 43 (H) 10 - 40 U/L    ALT 36 10 - 44 U/L    eGFR 40.4 (A) >60 mL/min/1.73 m^2    Anion Gap 10 8 - 16 mmol/L   TSH    Collection Time: 24 12:30 PM   Result Value Ref Range    TSH 1.481 0.400 - 4.000 uIU/mL       Imagin/4/2024 CT CAP-    Impression:     Persistent thickening of the gastric antrum though decreased compared to prior with measurements as above.  Adjacent nodes also slightly decreased in size.     Hypodensity in the liver and kidneys likely cysts.     Prostate is heterogeneous.  Bladder is not distended which may account for the anterior bladder wall thickening.    10/31/23 CT CAP - Circumferential wall thickening at the pre-pyloric/pyloric region in this patient with known adenocarcinoma.  Area of nodular soft tissue excrescence versus two adjacent mildly enlarged nodes concerning for metastasis.    Path:   10/20/23 - Gastric biopsy with invasive adenocarcinoma. Loss of nuclear expression of MLH1 and PMS2.       Assessment:       1. Malignant neoplasm of pyloric antrum    2. Iron deficiency anemia due to chronic blood loss    3. Anemia in neoplastic disease    4. Neoplastic (malignant) related fatigue    5. Cerebrovascular accident (CVA), unspecified mechanism    6. Other osteoarthritis of spine, cervical region    7. Arthralgia of both hands              Plan:        # Malignant Neoplasm of Pyloric Antrum   Patient diagnosed with gastric adenocarcinoma on 10/20/23. No signs of metastasis on imaging. Patient is not a candidate for surgery or chemotherapy given his age  and co-morbidities. His tumor was MSI-H (loss of nuclear expression of MLH1 and PMS2) so he is a candidate for immunotherapy. Spoke with patient and his family about the risks and benefits of immunotherapy and they are in agreement with starting it.     Began cycle 1 of q6 week Keytruda on 11/21/23.  Tolerating well. Eating well and has a good appetite  Anemia improving.  CT CAP in April showed improvement in the thickening of the gastric antrum and adjacent lymph nodes. Good response, recommend to c/w with Keytruda.    Presents prior to cycle 6.  Lab work reviewed and adequate for treatment   Proceed with cycle 6 of Keytruda 400mg.    - RTC in 6 weeks for cycle 7. Will repeat imaging prior to f/u.  .  # JOSE JUAN, anemia in neoplastic disease, neoplastic related fatigue  - Secondary to blood loss from gastric mass. Improved.   - Received Injectafer.  - will monitor TSH while receiving Pembro  - BP stable.    # CVA, arthralgias  - some residual deficits but remains ambulatory with assistive devices at home.  - Arthralgias may be immune-related, but he wants to continue without steroids.  Will continue NSAIDs.    Follow up: 6 weeks for cycle 7.     The above information has been reviewed with the patient and all questions have been answered to their apparent satisfaction.  They understand that they can call the clinic with any questions.      Route Chart for Scheduling    Med Onc Chart Routing      Follow up with physician 6 weeks. with me instead of Sena; prior to Keytruda   Follow up with LANDON    Infusion scheduling note    Injection scheduling note    Labs CBC, CMP and TSH   Scheduling:  Preferred lab:  Lab interval:     Imaging CT chest abdomen pelvis      Pharmacy appointment    Other referrals                  Treatment Plan Information   OP PEMBROLIZUMAB 400MG Q6W   Parish Steinberg MD   Upcoming Treatment Dates - OP PEMBROLIZUMAB 400MG Q6W    7/25/2024       Chemotherapy       pembrolizumab (KEYTRUDA) 400 mg in  0.9% NaCl SolP 116 mL infusion  9/5/2024       Chemotherapy       pembrolizumab (KEYTRUDA) 400 mg in 0.9% NaCl SolP 116 mL infusion  10/17/2024       Chemotherapy       pembrolizumab (KEYTRUDA) 400 mg in 0.9% NaCl SolP 116 mL infusion  11/28/2024       Chemotherapy       pembrolizumab (KEYTRUDA) 400 mg in 0.9% NaCl SolP 116 mL infusion    Therapy Plan Information  EPINEPHrine (EPIPEN) 0.3 mg/0.3 mL pen injection 0.3 mg  0.3 mg, Intramuscular, PRN  diphenhydrAMINE injection 50 mg  50 mg, Intravenous, PRN  hydrocortisone sodium succinate injection 100 mg  100 mg, Intravenous, PRN

## 2024-08-21 ENCOUNTER — TELEPHONE (OUTPATIENT)
Dept: HEMATOLOGY/ONCOLOGY | Facility: CLINIC | Age: 89
End: 2024-08-21
Payer: COMMERCIAL

## 2024-08-22 ENCOUNTER — OFFICE VISIT (OUTPATIENT)
Dept: HEMATOLOGY/ONCOLOGY | Facility: CLINIC | Age: 89
End: 2024-08-22
Payer: COMMERCIAL

## 2024-08-22 ENCOUNTER — INFUSION (OUTPATIENT)
Dept: INFUSION THERAPY | Facility: HOSPITAL | Age: 89
End: 2024-08-22
Payer: COMMERCIAL

## 2024-08-22 ENCOUNTER — LAB VISIT (OUTPATIENT)
Dept: LAB | Facility: HOSPITAL | Age: 89
End: 2024-08-22
Attending: INTERNAL MEDICINE
Payer: COMMERCIAL

## 2024-08-22 VITALS
HEIGHT: 68 IN | WEIGHT: 201.81 LBS | SYSTOLIC BLOOD PRESSURE: 168 MMHG | HEART RATE: 62 BPM | BODY MASS INDEX: 30.59 KG/M2 | TEMPERATURE: 99 F | RESPIRATION RATE: 18 BRPM | OXYGEN SATURATION: 97 % | DIASTOLIC BLOOD PRESSURE: 77 MMHG

## 2024-08-22 VITALS
HEART RATE: 67 BPM | WEIGHT: 201.81 LBS | BODY MASS INDEX: 30.59 KG/M2 | SYSTOLIC BLOOD PRESSURE: 172 MMHG | DIASTOLIC BLOOD PRESSURE: 72 MMHG | RESPIRATION RATE: 18 BRPM | HEIGHT: 68 IN

## 2024-08-22 DIAGNOSIS — M25.542 ARTHRALGIA OF BOTH HANDS: ICD-10-CM

## 2024-08-22 DIAGNOSIS — C16.3 MALIGNANT NEOPLASM OF PYLORIC ANTRUM: Primary | ICD-10-CM

## 2024-08-22 DIAGNOSIS — D50.0 IRON DEFICIENCY ANEMIA DUE TO CHRONIC BLOOD LOSS: ICD-10-CM

## 2024-08-22 DIAGNOSIS — M25.541 ARTHRALGIA OF BOTH HANDS: ICD-10-CM

## 2024-08-22 DIAGNOSIS — R53.0 NEOPLASTIC (MALIGNANT) RELATED FATIGUE: ICD-10-CM

## 2024-08-22 DIAGNOSIS — C16.3 MALIGNANT NEOPLASM OF PYLORIC ANTRUM: ICD-10-CM

## 2024-08-22 DIAGNOSIS — D63.0 ANEMIA IN NEOPLASTIC DISEASE: ICD-10-CM

## 2024-08-22 DIAGNOSIS — I63.9 CEREBROVASCULAR ACCIDENT (CVA), UNSPECIFIED MECHANISM: ICD-10-CM

## 2024-08-22 DIAGNOSIS — G89.29 CHRONIC LEFT SHOULDER PAIN: ICD-10-CM

## 2024-08-22 DIAGNOSIS — M25.512 CHRONIC LEFT SHOULDER PAIN: ICD-10-CM

## 2024-08-22 DIAGNOSIS — M47.892 OTHER OSTEOARTHRITIS OF SPINE, CERVICAL REGION: ICD-10-CM

## 2024-08-22 LAB
ALBUMIN SERPL BCP-MCNC: 3.7 G/DL (ref 3.5–5.2)
ALP SERPL-CCNC: 67 U/L (ref 55–135)
ALT SERPL W/O P-5'-P-CCNC: 37 U/L (ref 10–44)
ANION GAP SERPL CALC-SCNC: 11 MMOL/L (ref 8–16)
AST SERPL-CCNC: 48 U/L (ref 10–40)
BILIRUB SERPL-MCNC: 0.3 MG/DL (ref 0.1–1)
BUN SERPL-MCNC: 28 MG/DL (ref 10–30)
CALCIUM SERPL-MCNC: 9.3 MG/DL (ref 8.7–10.5)
CHLORIDE SERPL-SCNC: 102 MMOL/L (ref 95–110)
CO2 SERPL-SCNC: 27 MMOL/L (ref 23–29)
CREAT SERPL-MCNC: 1.3 MG/DL (ref 0.5–1.4)
ERYTHROCYTE [DISTWIDTH] IN BLOOD BY AUTOMATED COUNT: 15.5 % (ref 11.5–14.5)
EST. GFR  (NO RACE VARIABLE): 51.9 ML/MIN/1.73 M^2
GLUCOSE SERPL-MCNC: 97 MG/DL (ref 70–110)
HCT VFR BLD AUTO: 31.8 % (ref 40–54)
HGB BLD-MCNC: 10.7 G/DL (ref 14–18)
IMM GRANULOCYTES # BLD AUTO: 0.01 K/UL (ref 0–0.04)
MCH RBC QN AUTO: 29.4 PG (ref 27–31)
MCHC RBC AUTO-ENTMCNC: 33.6 G/DL (ref 32–36)
MCV RBC AUTO: 87 FL (ref 82–98)
NEUTROPHILS # BLD AUTO: 2.9 K/UL (ref 1.8–7.7)
PLATELET # BLD AUTO: 195 K/UL (ref 150–450)
PMV BLD AUTO: 10.6 FL (ref 9.2–12.9)
POTASSIUM SERPL-SCNC: 3.5 MMOL/L (ref 3.5–5.1)
PROT SERPL-MCNC: 7.2 G/DL (ref 6–8.4)
RBC # BLD AUTO: 3.64 M/UL (ref 4.6–6.2)
SODIUM SERPL-SCNC: 140 MMOL/L (ref 136–145)
TSH SERPL DL<=0.005 MIU/L-ACNC: 2.82 UIU/ML (ref 0.4–4)
WBC # BLD AUTO: 5.04 K/UL (ref 3.9–12.7)

## 2024-08-22 PROCEDURE — 80053 COMPREHEN METABOLIC PANEL: CPT | Performed by: INTERNAL MEDICINE

## 2024-08-22 PROCEDURE — 25000003 PHARM REV CODE 250: Performed by: INTERNAL MEDICINE

## 2024-08-22 PROCEDURE — 84443 ASSAY THYROID STIM HORMONE: CPT | Performed by: INTERNAL MEDICINE

## 2024-08-22 PROCEDURE — A4216 STERILE WATER/SALINE, 10 ML: HCPCS | Performed by: INTERNAL MEDICINE

## 2024-08-22 PROCEDURE — 36415 COLL VENOUS BLD VENIPUNCTURE: CPT | Performed by: INTERNAL MEDICINE

## 2024-08-22 PROCEDURE — 99999 PR PBB SHADOW E&M-EST. PATIENT-LVL IV: CPT | Mod: PBBFAC,,, | Performed by: INTERNAL MEDICINE

## 2024-08-22 PROCEDURE — 96413 CHEMO IV INFUSION 1 HR: CPT

## 2024-08-22 PROCEDURE — 85027 COMPLETE CBC AUTOMATED: CPT | Performed by: INTERNAL MEDICINE

## 2024-08-22 PROCEDURE — 63600175 PHARM REV CODE 636 W HCPCS: Mod: JZ,JG | Performed by: INTERNAL MEDICINE

## 2024-08-22 RX ORDER — PROCHLORPERAZINE EDISYLATE 5 MG/ML
5 INJECTION INTRAMUSCULAR; INTRAVENOUS ONCE AS NEEDED
Status: DISCONTINUED | OUTPATIENT
Start: 2024-08-22 | End: 2024-08-22 | Stop reason: HOSPADM

## 2024-08-22 RX ORDER — HEPARIN 100 UNIT/ML
500 SYRINGE INTRAVENOUS
Status: CANCELLED | OUTPATIENT
Start: 2024-08-22

## 2024-08-22 RX ORDER — EPINEPHRINE 0.3 MG/.3ML
0.3 INJECTION SUBCUTANEOUS ONCE AS NEEDED
Status: DISCONTINUED | OUTPATIENT
Start: 2024-08-22 | End: 2024-08-22 | Stop reason: HOSPADM

## 2024-08-22 RX ORDER — HEPARIN 100 UNIT/ML
500 SYRINGE INTRAVENOUS
Status: DISCONTINUED | OUTPATIENT
Start: 2024-08-22 | End: 2024-08-22 | Stop reason: HOSPADM

## 2024-08-22 RX ORDER — PROCHLORPERAZINE EDISYLATE 5 MG/ML
5 INJECTION INTRAMUSCULAR; INTRAVENOUS ONCE AS NEEDED
Status: CANCELLED
Start: 2024-08-22

## 2024-08-22 RX ORDER — EPINEPHRINE 0.3 MG/.3ML
0.3 INJECTION SUBCUTANEOUS ONCE AS NEEDED
Status: CANCELLED | OUTPATIENT
Start: 2024-08-22

## 2024-08-22 RX ORDER — SODIUM CHLORIDE 0.9 % (FLUSH) 0.9 %
10 SYRINGE (ML) INJECTION
Status: DISCONTINUED | OUTPATIENT
Start: 2024-08-22 | End: 2024-08-22 | Stop reason: HOSPADM

## 2024-08-22 RX ORDER — DIPHENHYDRAMINE HYDROCHLORIDE 50 MG/ML
50 INJECTION INTRAMUSCULAR; INTRAVENOUS ONCE AS NEEDED
Status: DISCONTINUED | OUTPATIENT
Start: 2024-08-22 | End: 2024-08-22 | Stop reason: HOSPADM

## 2024-08-22 RX ORDER — SODIUM CHLORIDE 0.9 % (FLUSH) 0.9 %
10 SYRINGE (ML) INJECTION
Status: CANCELLED | OUTPATIENT
Start: 2024-08-22

## 2024-08-22 RX ORDER — DIPHENHYDRAMINE HYDROCHLORIDE 50 MG/ML
50 INJECTION INTRAMUSCULAR; INTRAVENOUS ONCE AS NEEDED
Status: CANCELLED | OUTPATIENT
Start: 2024-08-22

## 2024-08-22 RX ADMIN — SODIUM CHLORIDE: 9 INJECTION, SOLUTION INTRAVENOUS at 11:08

## 2024-08-22 RX ADMIN — SODIUM CHLORIDE 400 MG: 9 INJECTION, SOLUTION INTRAVENOUS at 11:08

## 2024-08-22 RX ADMIN — Medication 10 ML: at 12:08

## 2024-08-22 NOTE — PROGRESS NOTES
MEDICAL ONCOLOGY - ESTABLISHED PATIENT VISIT    Reason for visit: Gastric Adenocarcinoma     Best Contact Phone Number(s): [phone] (home)      Cancer/Stage/TNM:    Cancer Staging   Malignant neoplasm of pyloric antrum  Staging form: Stomach, AJCC 8th Edition  - Clinical stage from 10/20/2023: Stage III (cT3, cN1, cM0) - Signed by Parish Steinberg MD on 11/3/2023       Oncology History   Malignant neoplasm of pyloric antrum   10/20/2023 Cancer Staged    Staging form: Stomach, AJCC 8th Edition  - Clinical stage from 10/20/2023: Stage III (cT3, cN1, cM0)     11/2/2023 Initial Diagnosis    Malignant neoplasm of pyloric antrum     11/21/2023 -  Chemotherapy    Treatment Summary   Plan Name: OP PEMBROLIZUMAB 400MG Q6W  Treatment Goal: Palliative  Status: Active  Start Date: 11/21/2023  End Date: 9/18/2025 (Planned)  Provider: Parish Steinberg MD  Chemotherapy: [No matching medication found in this treatment plan]          History:   89 y.o. male presents for evaluation of newly diagnosed gastric adenocarcinoma. He presented to the ED in September 2023 with weakness and was found to have a Hg of 4.2. He underwent and EGD which revealed an ulcerative mass, biopsies revealed high-grade dysplasia. Had an EUS on 10/20/23 which revealed an ulcerating prepyloric mass (T3, possible N1, M0), biopsy positive for invasic adenocarcinoma. HER2 negative but dMMR (loss of MLH1 and PMS2). NGS and pharmacogenomics were sent. CT CAP on 10/31/23 revealed a circumferential pre-pyloric/pyloric thickening, there are two areas of either extension of the primary mass or enarlged lymph nodes.     Interval History: Mr Chin returns to the clinic prior to cycle 7 of immunotherapy with pembrolizumab for his MSI-H gastric cancer. Complains of continued L shoulder pain for which he will intermittently take hydrocodone and ibuprofen for with only some relief.  Hands continue to be cramped.  No other new issues or toxicities.      Patient presents with his family.   ECOG PS is 1.       ROS:   Review of Systems   Constitutional:  Negative for chills and fever.   HENT:  Negative for congestion and sore throat.    Eyes:  Negative for pain.   Respiratory:  Negative for cough and shortness of breath.    Cardiovascular:  Negative for chest pain, palpitations and leg swelling.   Gastrointestinal:  Negative for abdominal pain, diarrhea, heartburn, nausea and vomiting.   Genitourinary:  Negative for dysuria.   Musculoskeletal:  Positive for joint pain (L shoulder) and myalgias. Negative for back pain.   Neurological:  Positive for weakness (left side). Negative for dizziness and headaches.   Psychiatric/Behavioral:  Negative for suicidal ideas. The patient is not nervous/anxious.        Past Medical History:   Past Medical History:   Diagnosis Date    Arthritis     In back, neck    Bell's palsy feb or march 2013    Blood clot in vein     right leg after back surgery    Cataract associated with other syndromes     GERD (gastroesophageal reflux disease)     Hypertension     Other and unspecified hyperlipidemia     Stroke     TIA (transient ischemic attack)     Feb or march 2013        Past Surgical History:   Past Surgical History:   Procedure Laterality Date    APPENDECTOMY      BACK SURGERY      COLONOSCOPY N/A 9/29/2023    Procedure: COLONOSCOPY;  Surgeon: Jan Lozano MD;  Location: [facility];  Service: Gastroenterology;  Laterality: N/A;    ENDOSCOPIC ULTRASOUND OF UPPER GASTROINTESTINAL TRACT N/A 10/20/2023    Procedure: ULTRASOUND, UPPER GI TRACT, ENDOSCOPIC;  Surgeon: Reymundo Rangel MD;  Location: [facility];  Service: Endoscopy;  Laterality: N/A;    ESOPHAGOGASTRODUODENOSCOPY N/A 9/29/2023    Procedure: EGD (ESOPHAGOGASTRODUODENOSCOPY);  Surgeon: Jan Lozano MD;  Location: [facility];  Service: Gastroenterology;  Laterality: N/A;    ESOPHAGOGASTRODUODENOSCOPY N/A 10/20/2023    Procedure: EGD  (ESOPHAGOGASTRODUODENOSCOPY);  Surgeon: Reymundo Rangel MD;  Location: [facility];  Service: Endoscopy;  Laterality: N/A;  urgent EGD/EUS (radial) for gastric ulcerated mass (HGD)   Referring: Jan Lozano MD  10/17/23-Pt is no longer on Plavix, H/O CVA with left hemiparesis, instructions via portal-DS    PROSTATE SURGERY          Family History:   Family History   Problem Relation Name Age of Onset    Stroke Father      Hypertension Son          Social History:   Social History     Tobacco Use    Smoking status: Former     Current packs/day: 0.00     Types: Cigarettes     Quit date: 1955     Years since quittin.6    Smokeless tobacco: Not on file   Substance Use Topics    Alcohol use: No        I have reviewed and updated the patient's past medical, surgical, family and social histories.    Allergies:   Review of patient's allergies indicates:   Allergen Reactions    Prednisone         Medications:   Current Outpatient Medications   Medication Sig Dispense Refill    amoxicillin (AMOXIL) 500 MG capsule Take 500 mg by mouth 2 (two) times daily. (Patient not taking: Reported on 2024)      atorvastatin (LIPITOR) 20 MG tablet Take 1 tablet (20 mg total) by mouth once daily. 30 tablet 1    bumetanide (BUMEX) 1 MG tablet Take 1 mg by mouth once daily.      docusate sodium (COLACE) 100 MG capsule Take 1 capsule (100 mg total) by mouth 2 (two) times daily as needed (while taking pain medication). 20 capsule 0    hydroCHLOROthiazide (HYDRODIURIL) 25 MG tablet Take 25 mg by mouth once daily.      HYDROcodone-acetaminophen (NORCO) 5-325 mg per tablet Take 1 tablet by mouth every 6 (six) hours as needed for Pain. 120 tablet 0    ibuprofen (ADVIL,MOTRIN) 800 MG tablet Take 1 tablet (800 mg total) by mouth every 8 (eight) hours as needed for Pain. 90 tablet 2    irbesartan (AVAPRO) 300 MG tablet Take 300 mg by mouth once daily.      multivitamin-minerals-lutein (MULTIVITAMIN 50 PLUS) Tab Take 1 tablet by  "mouth once daily.      nebivolol (BYSTOLIC) 10 MG Tab Take 1 tablet (10 mg total) by mouth once daily. 30 tablet 11    omeprazole (PRILOSEC OTC) 20 MG tablet Take 2 tablets (40 mg total) by mouth once daily. 122 tablet 0    oxybutynin (DITROPAN-XL) 10 MG 24 hr tablet Take 10 mg by mouth once daily.      potassium chloride SA (KLOR-CON M20) 20 MEQ tablet Take 1 tablet (20 mEq total) by mouth once daily. 30 tablet 11    promethazine-dextromethorphan (PROMETHAZINE-DM) 6.25-15 mg/5 mL Syrp Take 5 mLs by mouth every 6 (six) hours as needed. (Patient not taking: Reported on 5/16/2024)      saw palmetto 500 MG capsule Take 500 mg by mouth once daily.      zinc gluconate 50 mg tablet Take 50 mg by mouth once daily.       No current facility-administered medications for this visit.        Physical Exam:   BP (!) 172/72 (BP Location: Left arm, Patient Position: Sitting, BP Method: Large (Automatic))   Pulse 67   Resp 18   Ht 5' 8" (1.727 m)   Wt 91.6 kg (201 lb 13.3 oz)   BMI 30.69 kg/m²                Physical Exam  Constitutional:       Appearance: Normal appearance.   HENT:      Head: Normocephalic and atraumatic.      Mouth/Throat:      Mouth: Mucous membranes are moist.      Pharynx: Oropharynx is clear.   Eyes:      Extraocular Movements: Extraocular movements intact.      Pupils: Pupils are equal, round, and reactive to light.   Cardiovascular:      Rate and Rhythm: Normal rate and regular rhythm.      Pulses: Normal pulses.      Heart sounds: Normal heart sounds.   Pulmonary:      Effort: Pulmonary effort is normal.      Breath sounds: Normal breath sounds.   Abdominal:      General: Abdomen is flat.      Palpations: Abdomen is soft.      Tenderness: There is no abdominal tenderness.   Musculoskeletal:         General: Normal range of motion.      Cervical back: Normal range of motion and neck supple.      Right lower leg: No edema.      Left lower leg: No edema.   Skin:     General: Skin is warm and dry. "   Neurological:      General: No focal deficit present.      Mental Status: He is alert and oriented to person, place, and time.      Motor: Weakness (left side slightly weaker than right) present.   Psychiatric:         Mood and Affect: Mood normal.         Behavior: Behavior normal.           Labs:   Recent Results (from the past 48 hour(s))   CBC Oncology    Collection Time: 24  9:34 AM   Result Value Ref Range    WBC 5.04 3.90 - 12.70 K/uL    RBC 3.64 (L) 4.60 - 6.20 M/uL    Hemoglobin 10.7 (L) 14.0 - 18.0 g/dL    Hematocrit 31.8 (L) 40.0 - 54.0 %    MCV 87 82 - 98 fL    MCH 29.4 27.0 - 31.0 pg    MCHC 33.6 32.0 - 36.0 g/dL    RDW 15.5 (H) 11.5 - 14.5 %    Platelets 195 150 - 450 K/uL    MPV 10.6 9.2 - 12.9 fL    Gran # (ANC) 2.9 1.8 - 7.7 K/uL    Immature Grans (Abs) 0.01 0.00 - 0.04 K/uL         Imagin/4/2024 CT CAP-    Impression:     Persistent thickening of the gastric antrum though decreased compared to prior with measurements as above.  Adjacent nodes also slightly decreased in size.     Hypodensity in the liver and kidneys likely cysts.     Prostate is heterogeneous.  Bladder is not distended which may account for the anterior bladder wall thickening.    10/31/23 CT CAP - Circumferential wall thickening at the pre-pyloric/pyloric region in this patient with known adenocarcinoma.  Area of nodular soft tissue excrescence versus two adjacent mildly enlarged nodes concerning for metastasis.    Path:   10/20/23 - Gastric biopsy with invasive adenocarcinoma. Loss of nuclear expression of MLH1 and PMS2.       Assessment:       1. Malignant neoplasm of pyloric antrum    2. Iron deficiency anemia due to chronic blood loss    3. Anemia in neoplastic disease    4. Neoplastic (malignant) related fatigue    5. Cerebrovascular accident (CVA), unspecified mechanism    6. Other osteoarthritis of spine, cervical region    7. Arthralgia of both hands    8. Chronic left shoulder pain              Plan:         # Malignant Neoplasm of Pyloric Antrum   Patient diagnosed with gastric adenocarcinoma on 10/20/23. No signs of metastasis on imaging. Patient is not a candidate for surgery or chemotherapy given his age and co-morbidities. His tumor was MSI-H (loss of nuclear expression of MLH1 and PMS2) so he is a candidate for immunotherapy. Spoke with patient and his family about the risks and benefits of immunotherapy and they are in agreement with starting it.     Began cycle 1 of q6 week Keytruda on 11/21/23.  Tolerating well. Eating well and has a good appetite  Anemia improving.  CT CAP in April showed improvement in the thickening of the gastric antrum and adjacent lymph nodes. Good response, recommend to c/w with Keytruda.    Presents prior to cycle 7.  Lab work reviewed and adequate for treatment   Proceed with cycle 7 of Keytruda 400mg.    - RTC in 6 weeks for cycle 8. Will repeat imaging with CT in next couple weeks.    # JOSE JUAN, anemia in neoplastic disease, neoplastic related fatigue  - Secondary to blood loss from gastric mass. Improved.   - Received Injectafer.  - will monitor TSH while receiving Pembro  - BP elevated; asymptomatic.    # CVA, arthralgias  - some residual deficits but remains ambulatory with assistive devices at home.  - Arthralgias may be immune-related, but he wants to continue without steroids.  Will continue NSAIDs.  - Check L shoulder x-ray.    Follow up: 6 weeks for cycle 8.     The above information has been reviewed with the patient and all questions have been answered to their apparent satisfaction.  They understand that they can call the clinic with any questions.      Route Chart for Scheduling    Med Onc Chart Routing      Follow up with physician 3 months.   Follow up with LANDON 6 weeks.   Infusion scheduling note    Injection scheduling note    Labs CBC, CMP and TSH   Scheduling:  Preferred lab:  Lab interval:     Imaging CT chest abdomen pelvis and other   CT CAP and L shoulder xray in  next 2 weeks please   Pharmacy appointment    Other referrals                  Treatment Plan Information   OP PEMBROLIZUMAB 400MG Q6W   Parish Steinberg MD   Upcoming Treatment Dates - OP PEMBROLIZUMAB 400MG Q6W    7/25/2024       Chemotherapy       pembrolizumab (KEYTRUDA) 400 mg in 0.9% NaCl SolP 116 mL infusion  9/5/2024       Chemotherapy       pembrolizumab (KEYTRUDA) 400 mg in 0.9% NaCl SolP 116 mL infusion  10/17/2024       Chemotherapy       pembrolizumab (KEYTRUDA) 400 mg in 0.9% NaCl SolP 116 mL infusion  11/28/2024       Chemotherapy       pembrolizumab (KEYTRUDA) 400 mg in 0.9% NaCl SolP 116 mL infusion    Therapy Plan Information  EPINEPHrine (EPIPEN) 0.3 mg/0.3 mL pen injection 0.3 mg  0.3 mg, Intramuscular, PRN  diphenhydrAMINE injection 50 mg  50 mg, Intravenous, PRN  hydrocortisone sodium succinate injection 100 mg  100 mg, Intravenous, PRN

## 2024-08-22 NOTE — PLAN OF CARE
Pt tolerated Keytruda well. No adverse reaction noted. Pt education reinforced on chemo regimen, side effects, what to expect, and when to call Dr. Steinberg. Pt verbalized understanding. I reviewed pt calendar w/ pt and understanding verbalized.

## 2024-09-03 ENCOUNTER — HOSPITAL ENCOUNTER (OUTPATIENT)
Dept: RADIOLOGY | Facility: HOSPITAL | Age: 89
Discharge: HOME | End: 2024-09-03
Attending: INTERNAL MEDICINE
Payer: COMMERCIAL

## 2024-09-03 DIAGNOSIS — M25.512 CHRONIC LEFT SHOULDER PAIN: ICD-10-CM

## 2024-09-03 DIAGNOSIS — G89.29 CHRONIC LEFT SHOULDER PAIN: ICD-10-CM

## 2024-09-03 DIAGNOSIS — C16.3 MALIGNANT NEOPLASM OF PYLORIC ANTRUM: ICD-10-CM

## 2024-09-03 PROCEDURE — 73030 X-RAY EXAM OF SHOULDER: CPT | Mod: 26,LT,, | Performed by: RADIOLOGY

## 2024-09-03 PROCEDURE — 71260 CT THORAX DX C+: CPT | Mod: TC,PN

## 2024-09-03 PROCEDURE — 74177 CT ABD & PELVIS W/CONTRAST: CPT | Mod: TC,PN

## 2024-09-03 PROCEDURE — 73030 X-RAY EXAM OF SHOULDER: CPT | Mod: TC,FY,PN,LT

## 2024-09-03 PROCEDURE — 71260 CT THORAX DX C+: CPT | Mod: 26,,, | Performed by: STUDENT IN AN ORGANIZED HEALTH CARE EDUCATION/TRAINING PROGRAM

## 2024-09-03 PROCEDURE — 25500020 PHARM REV CODE 255: Mod: PN | Performed by: INTERNAL MEDICINE

## 2024-09-03 PROCEDURE — 74177 CT ABD & PELVIS W/CONTRAST: CPT | Mod: 26,,, | Performed by: STUDENT IN AN ORGANIZED HEALTH CARE EDUCATION/TRAINING PROGRAM

## 2024-09-03 RX ADMIN — IOHEXOL 100 ML: 350 INJECTION, SOLUTION INTRAVENOUS at 10:09

## 2024-09-03 RX ADMIN — IOHEXOL 30 ML: 300 INJECTION, SOLUTION INTRAVENOUS at 10:09

## 2024-10-03 ENCOUNTER — INFUSION (OUTPATIENT)
Dept: INFUSION THERAPY | Facility: HOSPITAL | Age: 89
End: 2024-10-03
Payer: COMMERCIAL

## 2024-10-03 ENCOUNTER — OFFICE VISIT (OUTPATIENT)
Dept: HEMATOLOGY/ONCOLOGY | Facility: CLINIC | Age: 89
End: 2024-10-03
Payer: COMMERCIAL

## 2024-10-03 ENCOUNTER — LAB VISIT (OUTPATIENT)
Dept: LAB | Facility: HOSPITAL | Age: 89
End: 2024-10-03
Payer: COMMERCIAL

## 2024-10-03 VITALS
TEMPERATURE: 99 F | SYSTOLIC BLOOD PRESSURE: 148 MMHG | HEIGHT: 68 IN | HEART RATE: 53 BPM | DIASTOLIC BLOOD PRESSURE: 72 MMHG | BODY MASS INDEX: 30.54 KG/M2 | OXYGEN SATURATION: 96 % | WEIGHT: 201.5 LBS

## 2024-10-03 VITALS
WEIGHT: 201.5 LBS | RESPIRATION RATE: 18 BRPM | HEART RATE: 59 BPM | HEIGHT: 68 IN | BODY MASS INDEX: 30.54 KG/M2 | OXYGEN SATURATION: 98 % | SYSTOLIC BLOOD PRESSURE: 147 MMHG | DIASTOLIC BLOOD PRESSURE: 67 MMHG

## 2024-10-03 DIAGNOSIS — M25.512 CHRONIC LEFT SHOULDER PAIN: ICD-10-CM

## 2024-10-03 DIAGNOSIS — R53.0 NEOPLASTIC (MALIGNANT) RELATED FATIGUE: ICD-10-CM

## 2024-10-03 DIAGNOSIS — G89.3 NEOPLASM RELATED PAIN: ICD-10-CM

## 2024-10-03 DIAGNOSIS — C16.3 MALIGNANT NEOPLASM OF PYLORIC ANTRUM: Primary | ICD-10-CM

## 2024-10-03 DIAGNOSIS — G89.29 CHRONIC LEFT SHOULDER PAIN: ICD-10-CM

## 2024-10-03 DIAGNOSIS — M25.541 ARTHRALGIA OF BOTH HANDS: ICD-10-CM

## 2024-10-03 DIAGNOSIS — M25.542 ARTHRALGIA OF BOTH HANDS: ICD-10-CM

## 2024-10-03 DIAGNOSIS — I63.9 CEREBROVASCULAR ACCIDENT (CVA), UNSPECIFIED MECHANISM: ICD-10-CM

## 2024-10-03 DIAGNOSIS — M47.892 OTHER OSTEOARTHRITIS OF SPINE, CERVICAL REGION: ICD-10-CM

## 2024-10-03 DIAGNOSIS — D63.0 ANEMIA IN NEOPLASTIC DISEASE: ICD-10-CM

## 2024-10-03 DIAGNOSIS — C16.3 MALIGNANT NEOPLASM OF PYLORIC ANTRUM: ICD-10-CM

## 2024-10-03 DIAGNOSIS — D50.0 IRON DEFICIENCY ANEMIA DUE TO CHRONIC BLOOD LOSS: ICD-10-CM

## 2024-10-03 LAB
ALBUMIN SERPL BCP-MCNC: 3.8 G/DL (ref 3.5–5.2)
ALP SERPL-CCNC: 66 U/L (ref 55–135)
ALT SERPL W/O P-5'-P-CCNC: 38 U/L (ref 10–44)
ANION GAP SERPL CALC-SCNC: 12 MMOL/L (ref 8–16)
AST SERPL-CCNC: 59 U/L (ref 10–40)
BASOPHILS # BLD AUTO: 0.04 K/UL (ref 0–0.2)
BASOPHILS NFR BLD: 0.9 % (ref 0–1.9)
BILIRUB SERPL-MCNC: 0.3 MG/DL (ref 0.1–1)
BUN SERPL-MCNC: 29 MG/DL (ref 10–30)
CALCIUM SERPL-MCNC: 9.5 MG/DL (ref 8.7–10.5)
CHLORIDE SERPL-SCNC: 101 MMOL/L (ref 95–110)
CO2 SERPL-SCNC: 23 MMOL/L (ref 23–29)
CREAT SERPL-MCNC: 1.5 MG/DL (ref 0.5–1.4)
DIFFERENTIAL METHOD BLD: ABNORMAL
EOSINOPHIL # BLD AUTO: 0.2 K/UL (ref 0–0.5)
EOSINOPHIL NFR BLD: 3.7 % (ref 0–8)
ERYTHROCYTE [DISTWIDTH] IN BLOOD BY AUTOMATED COUNT: 15.6 % (ref 11.5–14.5)
EST. GFR  (NO RACE VARIABLE): 43.7 ML/MIN/1.73 M^2
GLUCOSE SERPL-MCNC: 96 MG/DL (ref 70–110)
HCT VFR BLD AUTO: 35.7 % (ref 40–54)
HGB BLD-MCNC: 10.7 G/DL (ref 14–18)
IMM GRANULOCYTES # BLD AUTO: 0.01 K/UL (ref 0–0.04)
IMM GRANULOCYTES NFR BLD AUTO: 0.2 % (ref 0–0.5)
LYMPHOCYTES # BLD AUTO: 1.1 K/UL (ref 1–4.8)
LYMPHOCYTES NFR BLD: 23.9 % (ref 18–48)
MCH RBC QN AUTO: 28.2 PG (ref 27–31)
MCHC RBC AUTO-ENTMCNC: 30 G/DL (ref 32–36)
MCV RBC AUTO: 94 FL (ref 82–98)
MONOCYTES # BLD AUTO: 0.7 K/UL (ref 0.3–1)
MONOCYTES NFR BLD: 14.8 % (ref 4–15)
NEUTROPHILS # BLD AUTO: 2.6 K/UL (ref 1.8–7.7)
NEUTROPHILS NFR BLD: 56.5 % (ref 38–73)
NRBC BLD-RTO: 0 /100 WBC
PLATELET # BLD AUTO: 169 K/UL (ref 150–450)
PMV BLD AUTO: 10.3 FL (ref 9.2–12.9)
POTASSIUM SERPL-SCNC: 4.2 MMOL/L (ref 3.5–5.1)
PROT SERPL-MCNC: 7.5 G/DL (ref 6–8.4)
RBC # BLD AUTO: 3.8 M/UL (ref 4.6–6.2)
SODIUM SERPL-SCNC: 136 MMOL/L (ref 136–145)
TSH SERPL DL<=0.005 MIU/L-ACNC: 2.03 UIU/ML (ref 0.4–4)
WBC # BLD AUTO: 4.65 K/UL (ref 3.9–12.7)

## 2024-10-03 PROCEDURE — 84443 ASSAY THYROID STIM HORMONE: CPT | Performed by: PHYSICIAN ASSISTANT

## 2024-10-03 PROCEDURE — 85025 COMPLETE CBC W/AUTO DIFF WBC: CPT | Performed by: PHYSICIAN ASSISTANT

## 2024-10-03 PROCEDURE — 36415 COLL VENOUS BLD VENIPUNCTURE: CPT | Performed by: PHYSICIAN ASSISTANT

## 2024-10-03 PROCEDURE — 63600175 PHARM REV CODE 636 W HCPCS: Mod: JZ,JG | Performed by: REGISTERED NURSE

## 2024-10-03 PROCEDURE — 96413 CHEMO IV INFUSION 1 HR: CPT

## 2024-10-03 PROCEDURE — 99999 PR PBB SHADOW E&M-EST. PATIENT-LVL IV: CPT | Mod: PBBFAC,,, | Performed by: REGISTERED NURSE

## 2024-10-03 PROCEDURE — 25000003 PHARM REV CODE 250: Performed by: REGISTERED NURSE

## 2024-10-03 PROCEDURE — 80053 COMPREHEN METABOLIC PANEL: CPT | Performed by: PHYSICIAN ASSISTANT

## 2024-10-03 RX ORDER — HYDROCODONE BITARTRATE AND ACETAMINOPHEN 5; 325 MG/1; MG/1
1 TABLET ORAL EVERY 6 HOURS PRN
Qty: 120 TABLET | Refills: 0 | Status: SHIPPED | OUTPATIENT
Start: 2024-10-03

## 2024-10-03 RX ORDER — IBUPROFEN 800 MG/1
800 TABLET ORAL EVERY 8 HOURS PRN
Qty: 90 TABLET | Refills: 2 | Status: SHIPPED | OUTPATIENT
Start: 2024-10-03 | End: 2025-10-03

## 2024-10-03 RX ORDER — DIPHENHYDRAMINE HYDROCHLORIDE 50 MG/ML
50 INJECTION INTRAMUSCULAR; INTRAVENOUS ONCE AS NEEDED
Status: CANCELLED | OUTPATIENT
Start: 2024-10-03

## 2024-10-03 RX ORDER — HEPARIN 100 UNIT/ML
500 SYRINGE INTRAVENOUS
Status: CANCELLED | OUTPATIENT
Start: 2024-10-03

## 2024-10-03 RX ORDER — SODIUM CHLORIDE 0.9 % (FLUSH) 0.9 %
10 SYRINGE (ML) INJECTION
Status: DISCONTINUED | OUTPATIENT
Start: 2024-10-03 | End: 2024-10-03 | Stop reason: HOSPADM

## 2024-10-03 RX ORDER — PROCHLORPERAZINE EDISYLATE 5 MG/ML
5 INJECTION INTRAMUSCULAR; INTRAVENOUS ONCE AS NEEDED
Status: CANCELLED
Start: 2024-10-03

## 2024-10-03 RX ORDER — EPINEPHRINE 0.3 MG/.3ML
0.3 INJECTION SUBCUTANEOUS ONCE AS NEEDED
Status: DISCONTINUED | OUTPATIENT
Start: 2024-10-03 | End: 2024-10-03 | Stop reason: HOSPADM

## 2024-10-03 RX ORDER — DIPHENHYDRAMINE HYDROCHLORIDE 50 MG/ML
50 INJECTION INTRAMUSCULAR; INTRAVENOUS ONCE AS NEEDED
Status: DISCONTINUED | OUTPATIENT
Start: 2024-10-03 | End: 2024-10-03 | Stop reason: HOSPADM

## 2024-10-03 RX ORDER — PROCHLORPERAZINE EDISYLATE 5 MG/ML
5 INJECTION INTRAMUSCULAR; INTRAVENOUS ONCE AS NEEDED
Status: DISCONTINUED | OUTPATIENT
Start: 2024-10-03 | End: 2024-10-03 | Stop reason: HOSPADM

## 2024-10-03 RX ORDER — SODIUM CHLORIDE 0.9 % (FLUSH) 0.9 %
10 SYRINGE (ML) INJECTION
Status: CANCELLED | OUTPATIENT
Start: 2024-10-03

## 2024-10-03 RX ORDER — EPINEPHRINE 0.3 MG/.3ML
0.3 INJECTION SUBCUTANEOUS ONCE AS NEEDED
Status: CANCELLED | OUTPATIENT
Start: 2024-10-03

## 2024-10-03 RX ORDER — HEPARIN 100 UNIT/ML
500 SYRINGE INTRAVENOUS
Status: DISCONTINUED | OUTPATIENT
Start: 2024-10-03 | End: 2024-10-03 | Stop reason: HOSPADM

## 2024-10-03 RX ADMIN — SODIUM CHLORIDE 400 MG: 9 INJECTION, SOLUTION INTRAVENOUS at 01:10

## 2024-10-03 RX ADMIN — SODIUM CHLORIDE: 9 INJECTION, SOLUTION INTRAVENOUS at 01:10

## 2024-10-03 NOTE — PROGRESS NOTES
MEDICAL ONCOLOGY - ESTABLISHED PATIENT VISIT    Reason for visit: Gastric Adenocarcinoma     Best Contact Phone Number(s): [phone] (home)      Cancer/Stage/TNM:    Cancer Staging   Malignant neoplasm of pyloric antrum  Staging form: Stomach, AJCC 8th Edition  - Clinical stage from 10/20/2023: Stage III (cT3, cN1, cM0) - Signed by Parish Steinberg MD on 11/3/2023       Oncology History   Malignant neoplasm of pyloric antrum   10/20/2023 Cancer Staged    Staging form: Stomach, AJCC 8th Edition  - Clinical stage from 10/20/2023: Stage III (cT3, cN1, cM0)     11/2/2023 Initial Diagnosis    Malignant neoplasm of pyloric antrum     11/21/2023 -  Chemotherapy    Treatment Summary   Plan Name: OP PEMBROLIZUMAB 400MG Q6W  Treatment Goal: Palliative  Status: Active  Start Date: 11/21/2023  End Date: 9/18/2025 (Planned)  Provider: Parish Steinberg MD  Chemotherapy: [No matching medication found in this treatment plan]        History:   89 y.o. male presents for evaluation of newly diagnosed gastric adenocarcinoma. He presented to the ED in September 2023 with weakness and was found to have a Hg of 4.2. He underwent and EGD which revealed an ulcerative mass, biopsies revealed high-grade dysplasia. Had an EUS on 10/20/23 which revealed an ulcerating prepyloric mass (T3, possible N1, M0), biopsy positive for invasic adenocarcinoma. HER2 negative but dMMR (loss of MLH1 and PMS2). NGS and pharmacogenomics were sent. CT CAP on 10/31/23 revealed a circumferential pre-pyloric/pyloric thickening, there are two areas of either extension of the primary mass or enarlged lymph nodes.     Interval History: Mr Chin returns to the clinic prior to cycle 8 of immunotherapy with pembrolizumab for his MSI-H gastric cancer. Doing well overall. Had 2 teeth extracted in the interim and completed a course of antibiotics. Had diarrhea while taking these, but this has since resolved. No constipation or overt bleeding. No CP, SOB or  palpitations. Does have intermittent pruritus and dry skin, no visible rashes. Continues with L shoulder pain for which he takes ibuprofen and norco.     Patient presents with his daughter Tiffanie. ECOG PS is 1.     ROS:   Review of Systems   Constitutional:  Negative for chills and fever.   HENT:  Negative for congestion and sore throat.    Eyes:  Negative for pain.   Respiratory:  Negative for cough and shortness of breath.    Cardiovascular:  Negative for chest pain, palpitations and leg swelling.   Gastrointestinal:  Negative for abdominal pain, diarrhea, heartburn, nausea and vomiting.   Genitourinary:  Negative for dysuria.   Musculoskeletal:  Positive for joint pain (L shoulder) and myalgias. Negative for back pain.   Neurological:  Positive for weakness (left side). Negative for dizziness and headaches.   Psychiatric/Behavioral:  Negative for suicidal ideas. The patient is not nervous/anxious.        Past Medical History:   Past Medical History:   Diagnosis Date    Arthritis     In back, neck    Bell's palsy feb or march 2013    Blood clot in vein     right leg after back surgery    Cataract associated with other syndromes     GERD (gastroesophageal reflux disease)     Hypertension     Other and unspecified hyperlipidemia     Stroke     TIA (transient ischemic attack)     Feb or march 2013        Past Surgical History:   Past Surgical History:   Procedure Laterality Date    APPENDECTOMY      BACK SURGERY      COLONOSCOPY N/A 9/29/2023    Procedure: COLONOSCOPY;  Surgeon: Jan Lozano MD;  Location: [facility];  Service: Gastroenterology;  Laterality: N/A;    ENDOSCOPIC ULTRASOUND OF UPPER GASTROINTESTINAL TRACT N/A 10/20/2023    Procedure: ULTRASOUND, UPPER GI TRACT, ENDOSCOPIC;  Surgeon: Reymundo Rangel MD;  Location: [facility];  Service: Endoscopy;  Laterality: N/A;    ESOPHAGOGASTRODUODENOSCOPY N/A 9/29/2023    Procedure: EGD (ESOPHAGOGASTRODUODENOSCOPY);  Surgeon: Jan Lozano  MD LEAH;  Location: [facility] ENDO;  Service: Gastroenterology;  Laterality: N/A;    ESOPHAGOGASTRODUODENOSCOPY N/A 10/20/2023    Procedure: EGD (ESOPHAGOGASTRODUODENOSCOPY);  Surgeon: Reymundo Rangel MD;  Location: [facility] ENDO;  Service: Endoscopy;  Laterality: N/A;  urgent EGD/EUS (radial) for gastric ulcerated mass (HGD)   Referring: Jan Lozano MD  10/17/23-Pt is no longer on Plavix, H/O CVA with left hemiparesis, instructions via portal-DS    PROSTATE SURGERY          Family History:   Family History   Problem Relation Name Age of Onset    Stroke Father      Hypertension Son          Social History:   Social History     Tobacco Use    Smoking status: Former     Current packs/day: 0.00     Types: Cigarettes     Quit date: 1955     Years since quittin.8    Smokeless tobacco: Not on file   Substance Use Topics    Alcohol use: No        I have reviewed and updated the patient's past medical, surgical, family and social histories.    Allergies:   Review of patient's allergies indicates:   Allergen Reactions    Prednisone         Medications:   Current Outpatient Medications   Medication Sig Dispense Refill    bumetanide (BUMEX) 1 MG tablet Take 1 mg by mouth once daily.      docusate sodium (COLACE) 100 MG capsule Take 1 capsule (100 mg total) by mouth 2 (two) times daily as needed (while taking pain medication). 20 capsule 0    hydroCHLOROthiazide (HYDRODIURIL) 25 MG tablet Take 25 mg by mouth once daily.      HYDROcodone-acetaminophen (NORCO) 5-325 mg per tablet Take 1 tablet by mouth every 6 (six) hours as needed for Pain. 120 tablet 0    ibuprofen (ADVIL,MOTRIN) 800 MG tablet Take 1 tablet (800 mg total) by mouth every 8 (eight) hours as needed for Pain. 90 tablet 2    irbesartan (AVAPRO) 300 MG tablet Take 300 mg by mouth once daily.      multivitamin-minerals-lutein (MULTIVITAMIN 50 PLUS) Tab Take 1 tablet by mouth once daily.      nebivolol (BYSTOLIC) 10 MG Tab Take 1 tablet (10 mg total) by  "mouth once daily. 30 tablet 11    oxybutynin (DITROPAN-XL) 10 MG 24 hr tablet Take 10 mg by mouth once daily.      potassium chloride SA (KLOR-CON M20) 20 MEQ tablet Take 1 tablet (20 mEq total) by mouth once daily. 30 tablet 11    saw palmetto 500 MG capsule Take 500 mg by mouth once daily.      zinc gluconate 50 mg tablet Take 50 mg by mouth once daily.      amoxicillin (AMOXIL) 500 MG capsule Take 500 mg by mouth 2 (two) times daily. (Patient not taking: Reported on 10/3/2024)      atorvastatin (LIPITOR) 20 MG tablet Take 1 tablet (20 mg total) by mouth once daily. 30 tablet 1    omeprazole (PRILOSEC OTC) 20 MG tablet Take 2 tablets (40 mg total) by mouth once daily. 122 tablet 0    promethazine-dextromethorphan (PROMETHAZINE-DM) 6.25-15 mg/5 mL Syrp Take 5 mLs by mouth every 6 (six) hours as needed. (Patient not taking: Reported on 10/3/2024)       No current facility-administered medications for this visit.        Physical Exam:   BP (!) 148/72 (BP Location: Left arm, Patient Position: Sitting)   Pulse (!) 53   Temp 98.6 °F (37 °C) (Temporal)   Ht 5' 8" (1.727 m)   Wt 91.4 kg (201 lb 8 oz)   SpO2 96%   BMI 30.64 kg/m²            Physical Exam  Constitutional:       Appearance: Normal appearance.   HENT:      Head: Normocephalic and atraumatic.      Mouth/Throat:      Mouth: Mucous membranes are moist.      Pharynx: Oropharynx is clear.   Eyes:      Extraocular Movements: Extraocular movements intact.      Pupils: Pupils are equal, round, and reactive to light.   Cardiovascular:      Rate and Rhythm: Normal rate and regular rhythm.      Pulses: Normal pulses.      Heart sounds: Normal heart sounds.   Pulmonary:      Effort: Pulmonary effort is normal.      Breath sounds: Normal breath sounds.   Abdominal:      General: Abdomen is flat.      Palpations: Abdomen is soft.      Tenderness: There is no abdominal tenderness.   Musculoskeletal:         General: Normal range of motion.      Cervical back: Normal " range of motion and neck supple.      Right lower leg: No edema.      Left lower leg: No edema.   Skin:     General: Skin is warm and dry.   Neurological:      General: No focal deficit present.      Mental Status: He is alert and oriented to person, place, and time.      Motor: Weakness (left side slightly weaker than right) present.   Psychiatric:         Mood and Affect: Mood normal.         Behavior: Behavior normal.           Labs:   Recent Results (from the past 48 hours)   CBC W/ AUTO DIFFERENTIAL    Collection Time: 10/03/24 10:23 AM   Result Value Ref Range    WBC 4.65 3.90 - 12.70 K/uL    RBC 3.80 (L) 4.60 - 6.20 M/uL    Hemoglobin 10.7 (L) 14.0 - 18.0 g/dL    Hematocrit 35.7 (L) 40.0 - 54.0 %    MCV 94 82 - 98 fL    MCH 28.2 27.0 - 31.0 pg    MCHC 30.0 (L) 32.0 - 36.0 g/dL    RDW 15.6 (H) 11.5 - 14.5 %    Platelets 169 150 - 450 K/uL    MPV 10.3 9.2 - 12.9 fL    Immature Granulocytes 0.2 0.0 - 0.5 %    Gran # (ANC) 2.6 1.8 - 7.7 K/uL    Immature Grans (Abs) 0.01 0.00 - 0.04 K/uL    Lymph # 1.1 1.0 - 4.8 K/uL    Mono # 0.7 0.3 - 1.0 K/uL    Eos # 0.2 0.0 - 0.5 K/uL    Baso # 0.04 0.00 - 0.20 K/uL    nRBC 0 0 /100 WBC    Gran % 56.5 38.0 - 73.0 %    Lymph % 23.9 18.0 - 48.0 %    Mono % 14.8 4.0 - 15.0 %    Eosinophil % 3.7 0.0 - 8.0 %    Basophil % 0.9 0.0 - 1.9 %    Differential Method Automated    Comprehensive Metabolic Panel    Collection Time: 10/03/24 10:23 AM   Result Value Ref Range    Sodium 136 136 - 145 mmol/L    Potassium 4.2 3.5 - 5.1 mmol/L    Chloride 101 95 - 110 mmol/L    CO2 23 23 - 29 mmol/L    Glucose 96 70 - 110 mg/dL    BUN 29 10 - 30 mg/dL    Creatinine 1.5 (H) 0.5 - 1.4 mg/dL    Calcium 9.5 8.7 - 10.5 mg/dL    Total Protein 7.5 6.0 - 8.4 g/dL    Albumin 3.8 3.5 - 5.2 g/dL    Total Bilirubin 0.3 0.1 - 1.0 mg/dL    Alkaline Phosphatase 66 55 - 135 U/L    AST 59 (H) 10 - 40 U/L    ALT 38 10 - 44 U/L    eGFR 43.7 (A) >60 mL/min/1.73 m^2    Anion Gap 12 8 - 16 mmol/L     Imaging:       9/3/24 CT CAP   Impression:     Unchanged mild wall thickening of the gastric antrum with unchanged small adjacent lymph nodes.     No new evidence of intrathoracic or abdominopelvic metastatic disease.    Path:   10/20/23 - Gastric biopsy with invasive adenocarcinoma. Loss of nuclear expression of MLH1 and PMS2.       Assessment:       No diagnosis found.   Plan:        # Malignant Neoplasm of Pyloric Antrum   Patient diagnosed with gastric adenocarcinoma on 10/20/23. No signs of metastasis on imaging. Patient is not a candidate for surgery or chemotherapy given his age and co-morbidities. His tumor was MSI-H (loss of nuclear expression of MLH1 and PMS2) so he is a candidate for immunotherapy. Spoke with patient and his family about the risks and benefits of immunotherapy and they are in agreement with starting it.     Began cycle 1 of q6 week Keytruda on 11/21/23.  Tolerating well. Eating well and has a good appetite  Anemia improving.  CT CAP in April showed improvement in the thickening of the gastric antrum and adjacent lymph nodes. Good response, recommend to c/w with Keytruda.  CT CAP in September 2024 showed stable disease.     Presents prior to cycle 8.  Lab work reviewed and adequate for treatment   Proceed with cycle 8 of Keytruda 400mg.    - RTC in 6 weeks for cycle 9.     # JOSE JUAN, anemia in neoplastic disease, neoplastic related fatigue  - Secondary to blood loss from gastric mass. Improved.   - Received Injectafer.  - will monitor TSH while receiving Pembro  - BP elevated; asymptomatic.    # CVA, arthralgias  - some residual deficits but remains ambulatory with assistive devices at home.  - Arthralgias may be immune-related, but he wants to continue without steroids.  Will continue NSAIDs.    Follow up: 6 weeks for cycle 9.     Patient is in agreement with the proposed treatment plan. All questions were answered to the patient's satisfaction. Pt knows to call clinic if anything is needed before the  next clinic visit.    Patient discussed with collaborating physician, Dr. Steinberg.    At least 40 minutes were spent today on this encounter including face to face time with the patient, data gathering/interpretation and documentation.       Janine Koehler, MSN, APRN, [facility]  Hematology and Medical Oncology  Clinical Nurse Specialist to Dr. Steinberg, Dr. Medina & Dr. Portillo Chart for Scheduling    Med Onc Chart Routing      Follow up with physician 6 weeks. keep as scheduled - thanks!   Follow up with LANDON    Infusion scheduling note   keytruda every 6 weeks   Injection scheduling note    Labs CBC, CMP and TSH   Scheduling:  Preferred lab:  Lab interval: every 6 weeks     Imaging    Pharmacy appointment    Other referrals              Treatment Plan Information   OP PEMBROLIZUMAB 400MG Q6W Parish Steinberg MD   Associated diagnosis: Malignant neoplasm of pyloric antrum Stage III cT3, cN1, cM0 noted on 11/2/2023   Line of treatment: First Line  Treatment Goal: Palliative     Upcoming Treatment Dates - OP PEMBROLIZUMAB 400MG Q6W    9/5/2024       Chemotherapy       pembrolizumab (KEYTRUDA) 400 mg in 0.9% NaCl SolP 116 mL infusion  10/17/2024       Chemotherapy       pembrolizumab (KEYTRUDA) 400 mg in 0.9% NaCl SolP 116 mL infusion  11/28/2024       Chemotherapy       pembrolizumab (KEYTRUDA) 400 mg in 0.9% NaCl SolP 116 mL infusion  1/9/2025       Chemotherapy       pembrolizumab (KEYTRUDA) 400 mg in 0.9% NaCl SolP 116 mL infusion    Therapy Plan Information  INJECTAFER (FERRIC CARBOXYMALTOSE) for 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    No therapy plan of the specified type found.

## 2024-10-03 NOTE — PLAN OF CARE
1339  Patient completed Keytruda infusion, tolerated well.  PIV discontinued without issue.  Pt used wheel chair as walker to ambulate to the bathroom.  Patient wheeled off unit by daughter.  RTC 11/14/24

## 2024-11-14 ENCOUNTER — LAB VISIT (OUTPATIENT)
Dept: LAB | Facility: HOSPITAL | Age: 89
End: 2024-11-14
Attending: INTERNAL MEDICINE
Payer: COMMERCIAL

## 2024-11-14 ENCOUNTER — OFFICE VISIT (OUTPATIENT)
Dept: HEMATOLOGY/ONCOLOGY | Facility: CLINIC | Age: 89
End: 2024-11-14
Payer: COMMERCIAL

## 2024-11-14 ENCOUNTER — INFUSION (OUTPATIENT)
Dept: INFUSION THERAPY | Facility: HOSPITAL | Age: 89
End: 2024-11-14
Payer: COMMERCIAL

## 2024-11-14 VITALS
DIASTOLIC BLOOD PRESSURE: 74 MMHG | BODY MASS INDEX: 30.74 KG/M2 | SYSTOLIC BLOOD PRESSURE: 154 MMHG | HEART RATE: 61 BPM | SYSTOLIC BLOOD PRESSURE: 170 MMHG | TEMPERATURE: 99 F | OXYGEN SATURATION: 99 % | RESPIRATION RATE: 18 BRPM | BODY MASS INDEX: 30.72 KG/M2 | HEIGHT: 68 IN | HEIGHT: 68 IN | WEIGHT: 202.69 LBS | DIASTOLIC BLOOD PRESSURE: 68 MMHG | WEIGHT: 202.81 LBS | TEMPERATURE: 98 F | HEART RATE: 54 BPM

## 2024-11-14 DIAGNOSIS — C16.3 MALIGNANT NEOPLASM OF PYLORIC ANTRUM: ICD-10-CM

## 2024-11-14 DIAGNOSIS — C16.3 MALIGNANT NEOPLASM OF PYLORIC ANTRUM: Primary | ICD-10-CM

## 2024-11-14 DIAGNOSIS — D50.0 IRON DEFICIENCY ANEMIA DUE TO CHRONIC BLOOD LOSS: ICD-10-CM

## 2024-11-14 DIAGNOSIS — D63.0 ANEMIA IN NEOPLASTIC DISEASE: ICD-10-CM

## 2024-11-14 DIAGNOSIS — M25.542 ARTHRALGIA OF BOTH HANDS: ICD-10-CM

## 2024-11-14 DIAGNOSIS — G89.3 NEOPLASM RELATED PAIN: ICD-10-CM

## 2024-11-14 DIAGNOSIS — I63.9 CEREBROVASCULAR ACCIDENT (CVA), UNSPECIFIED MECHANISM: ICD-10-CM

## 2024-11-14 DIAGNOSIS — M25.541 ARTHRALGIA OF BOTH HANDS: ICD-10-CM

## 2024-11-14 LAB
ALBUMIN SERPL BCP-MCNC: 3.8 G/DL (ref 3.5–5.2)
ALP SERPL-CCNC: 64 U/L (ref 40–150)
ALT SERPL W/O P-5'-P-CCNC: 27 U/L (ref 10–44)
ANION GAP SERPL CALC-SCNC: 11 MMOL/L (ref 8–16)
AST SERPL-CCNC: 45 U/L (ref 10–40)
BILIRUB SERPL-MCNC: 0.3 MG/DL (ref 0.1–1)
BUN SERPL-MCNC: 28 MG/DL (ref 10–30)
CALCIUM SERPL-MCNC: 9.6 MG/DL (ref 8.7–10.5)
CHLORIDE SERPL-SCNC: 104 MMOL/L (ref 95–110)
CO2 SERPL-SCNC: 27 MMOL/L (ref 23–29)
CREAT SERPL-MCNC: 1.5 MG/DL (ref 0.5–1.4)
ERYTHROCYTE [DISTWIDTH] IN BLOOD BY AUTOMATED COUNT: 14.1 % (ref 11.5–14.5)
EST. GFR  (NO RACE VARIABLE): 43.7 ML/MIN/1.73 M^2
GLUCOSE SERPL-MCNC: 86 MG/DL (ref 70–110)
HCT VFR BLD AUTO: 34.2 % (ref 40–54)
HGB BLD-MCNC: 10.7 G/DL (ref 14–18)
IMM GRANULOCYTES # BLD AUTO: 0.01 K/UL (ref 0–0.04)
MCH RBC QN AUTO: 29 PG (ref 27–31)
MCHC RBC AUTO-ENTMCNC: 31.3 G/DL (ref 32–36)
MCV RBC AUTO: 93 FL (ref 82–98)
NEUTROPHILS # BLD AUTO: 2.9 K/UL (ref 1.8–7.7)
PLATELET # BLD AUTO: 184 K/UL (ref 150–450)
PMV BLD AUTO: 10.5 FL (ref 9.2–12.9)
POTASSIUM SERPL-SCNC: 3.6 MMOL/L (ref 3.5–5.1)
PROT SERPL-MCNC: 7.1 G/DL (ref 6–8.4)
RBC # BLD AUTO: 3.69 M/UL (ref 4.6–6.2)
SODIUM SERPL-SCNC: 142 MMOL/L (ref 136–145)
TSH SERPL DL<=0.005 MIU/L-ACNC: 2.17 UIU/ML (ref 0.4–4)
WBC # BLD AUTO: 4.99 K/UL (ref 3.9–12.7)

## 2024-11-14 PROCEDURE — 96413 CHEMO IV INFUSION 1 HR: CPT

## 2024-11-14 PROCEDURE — 25000003 PHARM REV CODE 250: Performed by: INTERNAL MEDICINE

## 2024-11-14 PROCEDURE — 36415 COLL VENOUS BLD VENIPUNCTURE: CPT | Performed by: INTERNAL MEDICINE

## 2024-11-14 PROCEDURE — 84443 ASSAY THYROID STIM HORMONE: CPT | Performed by: INTERNAL MEDICINE

## 2024-11-14 PROCEDURE — 63600175 PHARM REV CODE 636 W HCPCS: Mod: JZ,JG | Performed by: INTERNAL MEDICINE

## 2024-11-14 PROCEDURE — 85027 COMPLETE CBC AUTOMATED: CPT | Performed by: INTERNAL MEDICINE

## 2024-11-14 PROCEDURE — 99999 PR PBB SHADOW E&M-EST. PATIENT-LVL IV: CPT | Mod: PBBFAC,,, | Performed by: INTERNAL MEDICINE

## 2024-11-14 PROCEDURE — 80053 COMPREHEN METABOLIC PANEL: CPT | Performed by: INTERNAL MEDICINE

## 2024-11-14 RX ORDER — SODIUM CHLORIDE 0.9 % (FLUSH) 0.9 %
10 SYRINGE (ML) INJECTION
Status: CANCELLED | OUTPATIENT
Start: 2024-11-14

## 2024-11-14 RX ORDER — PROCHLORPERAZINE EDISYLATE 5 MG/ML
5 INJECTION INTRAMUSCULAR; INTRAVENOUS ONCE AS NEEDED
Status: CANCELLED
Start: 2024-11-14

## 2024-11-14 RX ORDER — SODIUM CHLORIDE 0.9 % (FLUSH) 0.9 %
10 SYRINGE (ML) INJECTION
Status: DISCONTINUED | OUTPATIENT
Start: 2024-11-14 | End: 2024-11-14 | Stop reason: HOSPADM

## 2024-11-14 RX ORDER — PROCHLORPERAZINE EDISYLATE 5 MG/ML
5 INJECTION INTRAMUSCULAR; INTRAVENOUS ONCE AS NEEDED
Status: DISCONTINUED | OUTPATIENT
Start: 2024-11-14 | End: 2024-11-14 | Stop reason: HOSPADM

## 2024-11-14 RX ORDER — HEPARIN 100 UNIT/ML
500 SYRINGE INTRAVENOUS
Status: DISCONTINUED | OUTPATIENT
Start: 2024-11-14 | End: 2024-11-14 | Stop reason: HOSPADM

## 2024-11-14 RX ORDER — DIPHENHYDRAMINE HYDROCHLORIDE 50 MG/ML
50 INJECTION INTRAMUSCULAR; INTRAVENOUS ONCE AS NEEDED
Status: DISCONTINUED | OUTPATIENT
Start: 2024-11-14 | End: 2024-11-14 | Stop reason: HOSPADM

## 2024-11-14 RX ORDER — HEPARIN 100 UNIT/ML
500 SYRINGE INTRAVENOUS
Status: CANCELLED | OUTPATIENT
Start: 2024-11-14

## 2024-11-14 RX ORDER — EPINEPHRINE 0.3 MG/.3ML
0.3 INJECTION SUBCUTANEOUS ONCE AS NEEDED
Status: CANCELLED | OUTPATIENT
Start: 2024-11-14

## 2024-11-14 RX ORDER — EPINEPHRINE 0.3 MG/.3ML
0.3 INJECTION SUBCUTANEOUS ONCE AS NEEDED
Status: DISCONTINUED | OUTPATIENT
Start: 2024-11-14 | End: 2024-11-14 | Stop reason: HOSPADM

## 2024-11-14 RX ORDER — DIPHENHYDRAMINE HYDROCHLORIDE 50 MG/ML
50 INJECTION INTRAMUSCULAR; INTRAVENOUS ONCE AS NEEDED
Status: CANCELLED | OUTPATIENT
Start: 2024-11-14

## 2024-11-14 RX ADMIN — SODIUM CHLORIDE 400 MG: 9 INJECTION, SOLUTION INTRAVENOUS at 02:11

## 2024-11-14 NOTE — PLAN OF CARE
Pt has no veins Addressed with dr Steinberg Will put port referral in. Did well with keytruda. Asked pt to increase flds.

## 2024-11-22 ENCOUNTER — TELEPHONE (OUTPATIENT)
Dept: INTERVENTIONAL RADIOLOGY/VASCULAR | Facility: CLINIC | Age: 89
End: 2024-11-22
Payer: COMMERCIAL

## 2024-11-25 DIAGNOSIS — C16.3 MALIGNANT NEOPLASM OF PYLORIC ANTRUM: Primary | ICD-10-CM

## 2024-11-27 ENCOUNTER — TELEPHONE (OUTPATIENT)
Dept: INTERVENTIONAL RADIOLOGY/VASCULAR | Facility: HOSPITAL | Age: 89
End: 2024-11-27
Payer: COMMERCIAL

## 2024-11-27 NOTE — NURSING
To Tiera MEMBRENO: arrive at 12:00, where to check in, have nothing to eat/drink past midnight, have a ride to/from procedure, take a.m. meds with a small sip of water, bring a list of current home meds. Confirmed one allergy and no blood thinner use.

## 2024-11-29 ENCOUNTER — HOSPITAL ENCOUNTER (OUTPATIENT)
Dept: INTERVENTIONAL RADIOLOGY/VASCULAR | Facility: HOSPITAL | Age: 89
Discharge: HOME | End: 2024-11-29
Attending: INTERNAL MEDICINE
Payer: COMMERCIAL

## 2024-11-29 VITALS
SYSTOLIC BLOOD PRESSURE: 150 MMHG | OXYGEN SATURATION: 95 % | RESPIRATION RATE: 14 BRPM | DIASTOLIC BLOOD PRESSURE: 70 MMHG | HEART RATE: 61 BPM

## 2024-11-29 DIAGNOSIS — C16.3 MALIGNANT NEOPLASM OF PYLORIC ANTRUM: ICD-10-CM

## 2024-11-29 PROCEDURE — 36561 INSERT TUNNELED CV CATH: CPT | Mod: RT,,, | Performed by: RADIOLOGY

## 2024-11-29 PROCEDURE — 76937 US GUIDE VASCULAR ACCESS: CPT | Mod: 26,,, | Performed by: RADIOLOGY

## 2024-11-29 PROCEDURE — 99152 MOD SED SAME PHYS/QHP 5/>YRS: CPT | Performed by: RADIOLOGY

## 2024-11-29 PROCEDURE — 36561 INSERT TUNNELED CV CATH: CPT | Mod: RT | Performed by: RADIOLOGY

## 2024-11-29 PROCEDURE — 76937 US GUIDE VASCULAR ACCESS: CPT | Mod: TC | Performed by: RADIOLOGY

## 2024-11-29 PROCEDURE — 99153 MOD SED SAME PHYS/QHP EA: CPT

## 2024-11-29 PROCEDURE — 99152 MOD SED SAME PHYS/QHP 5/>YRS: CPT

## 2024-11-29 PROCEDURE — C1769 GUIDE WIRE: HCPCS

## 2024-11-29 PROCEDURE — C1788 PORT, INDWELLING, IMP: HCPCS

## 2024-11-29 PROCEDURE — 63600175 PHARM REV CODE 636 W HCPCS: Performed by: RADIOLOGY

## 2024-11-29 PROCEDURE — 99152 MOD SED SAME PHYS/QHP 5/>YRS: CPT | Mod: ,,, | Performed by: RADIOLOGY

## 2024-11-29 PROCEDURE — C1894 INTRO/SHEATH, NON-LASER: HCPCS

## 2024-11-29 PROCEDURE — 77001 FLUOROGUIDE FOR VEIN DEVICE: CPT | Mod: 26,,, | Performed by: RADIOLOGY

## 2024-11-29 RX ORDER — LIDOCAINE HYDROCHLORIDE 10 MG/ML
INJECTION, SOLUTION INFILTRATION; PERINEURAL
Status: COMPLETED | OUTPATIENT
Start: 2024-11-29 | End: 2024-11-29

## 2024-11-29 RX ORDER — HEPARIN SODIUM 1000 [USP'U]/ML
INJECTION, SOLUTION INTRAVENOUS; SUBCUTANEOUS
Status: COMPLETED | OUTPATIENT
Start: 2024-11-29 | End: 2024-11-29

## 2024-11-29 RX ORDER — MIDAZOLAM HYDROCHLORIDE 1 MG/ML
INJECTION, SOLUTION INTRAMUSCULAR; INTRAVENOUS
Status: COMPLETED | OUTPATIENT
Start: 2024-11-29 | End: 2024-11-29

## 2024-11-29 RX ORDER — FENTANYL CITRATE 50 UG/ML
INJECTION, SOLUTION INTRAMUSCULAR; INTRAVENOUS
Status: COMPLETED | OUTPATIENT
Start: 2024-11-29 | End: 2024-11-29

## 2024-11-29 RX ADMIN — FENTANYL CITRATE 50 MCG: 50 INJECTION INTRAMUSCULAR; INTRAVENOUS at 01:11

## 2024-11-29 RX ADMIN — LIDOCAINE HYDROCHLORIDE 3 ML: 10 INJECTION, SOLUTION INFILTRATION; PERINEURAL at 01:11

## 2024-11-29 RX ADMIN — MIDAZOLAM 0.5 MG: 1 INJECTION INTRAMUSCULAR; INTRAVENOUS at 01:11

## 2024-11-29 RX ADMIN — HEPARIN SODIUM 4000 UNITS: 1000 INJECTION, SOLUTION INTRAVENOUS; SUBCUTANEOUS at 01:11

## 2024-11-29 NOTE — DISCHARGE INSTRUCTIONS
*PLEASE READ CAREFULLY!!*      Your port insertion/removal site is dressed with gauze and Tega-Derm (looks like Saran Wrap). Underneath the dressing is Derma-Bond (skin glue) and Steri-Strips (looks like strips of white tape). There are also sutures, or stitches, on the inside - these will dissolve on their own.    Do NOT remove the dressing today. Do NOT shower/bathe tonight.     You can shower/bathe tomorrow night. Leave the dressing ON during your shower/bath. Turn the site away from the shower's spray; if you bathe, do NOT allow the site underwater. After your shower/bath, you may remove the dressing. Gently pat the site dry.    The site may remained uncovered. Allow the Steri-Strips and Derma-Bond to come off on their own. You can use a large pad-style bandage, or a 4x4 gauze dressing and tape to cover the site if your clothing (or bra straps) rubbing against it is uncomfortable to you. These items can be found at your local pharmacy or grocery/shopping center.    Until the site heals, usually within one to two weeks, DO NOT submerge in a bath tub, swimming pool, or jacuzzi. DO NOT lift/carry anything heavier than a gallon of milk. DO NOT reach excessively or repeatedly above your head.    Hand hygiene is VERY important! Anyone, including you, who touches the port site should do so with CLEAN hands.         For INSERTIONS: Only trained personnel should access your port. At the end of whatever activity involving your port is completed (chemo, other infusion, blood draw, etc), it should be flushed with a medicine called Heparin.    Interventional Radiology Clinic    [phone], choose prompt 3, Monday - Friday, 8:00 am - 4:00 pm    [phone] After hours and on holidays. Ask to speak with the interventional radiologist on call.

## 2024-11-29 NOTE — PROCEDURES
Radiology Post-Procedure Note    Pre Op Diagnosis: Gastric CA  Post Op Diagnosis: Same    Procedure: Right internal jugular vein single lumen chest port placement    Procedure performed by: Lui Hedrick MD    Written Informed Consent Obtained: Yes  Specimen Removed: NO  Estimated Blood Loss: Minimal    Findings:   Widely patent right internal jugular vein, the port is ready for immediate use.     Patient tolerated procedure well.    Lui Hedrick MD  Interventional Radiology  Department of Radiology

## 2024-11-29 NOTE — SEDATION DOCUMENTATION
Procedure complete, pt tolerated well. Patient alert and oriented x4 unlabored on Room Air. Patient denies pain and is resting comfortably. Surgical site dressed with steri strips, dermabond, tegaqderm. Dressing is clean, dry, and intact. Patient transported to recovery.

## 2024-11-29 NOTE — SEDATION DOCUMENTATION
Pt arrived to IR suite for port placement. Pt oriented to unit and staff. Plan of care reviewed with patient, patient verbalizes understanding. Comfort measures utilized. Pt safely transferred from stretcher to procedural table. Fall risk reviewed with patient, fall risk interventions maintained. Safety strap applied, positioner pillows utilized to minimize pressure points. Blankets applied. Pt prepped and draped utilizing standard sterile technique. Patient placed on continuous monitoring, as required by sedation policy. Timeouts completed utilizing standard universal time-out, per department and facility policy. RN to remain at bedside, continuous monitoring maintained. Pt resting comfortably. Denies pain/discomfort. Will continue to monitor. See flow sheets for monitoring, medication administration, and updates.

## 2024-11-29 NOTE — PLAN OF CARE
Pt VSS and in NAD. PIV and CRM equipment removed. Discharge instructions and AVS provided. Pt verbalized understanding, questions and concerns addressed. No further needs at this time. Pt discharged from recovery area at 1455.

## 2024-11-29 NOTE — H&P
Radiology History & Physical      SUBJECTIVE:     Chief Complaint: gastric cancer    History of Present Illness:  Lon Chin Jr. is a 89 y.o. male who presents for chest port placement for chemotherapy.    Past Medical History:   Diagnosis Date    Arthritis     In back, neck    Bell's palsy feb or march 2013    Blood clot in vein     right leg after back surgery    Cataract associated with other syndromes     GERD (gastroesophageal reflux disease)     Hypertension     Other and unspecified hyperlipidemia     Stroke     TIA (transient ischemic attack)     Feb or march 2013     Past Surgical History:   Procedure Laterality Date    APPENDECTOMY      BACK SURGERY      COLONOSCOPY N/A 9/29/2023    Procedure: COLONOSCOPY;  Surgeon: Jan Lozano MD;  Location: [facility];  Service: Gastroenterology;  Laterality: N/A;    ENDOSCOPIC ULTRASOUND OF UPPER GASTROINTESTINAL TRACT N/A 10/20/2023    Procedure: ULTRASOUND, UPPER GI TRACT, ENDOSCOPIC;  Surgeon: Reymundo Rangel MD;  Location: [facility];  Service: Endoscopy;  Laterality: N/A;    ESOPHAGOGASTRODUODENOSCOPY N/A 9/29/2023    Procedure: EGD (ESOPHAGOGASTRODUODENOSCOPY);  Surgeon: Jan Lozano MD;  Location: [facility];  Service: Gastroenterology;  Laterality: N/A;    ESOPHAGOGASTRODUODENOSCOPY N/A 10/20/2023    Procedure: EGD (ESOPHAGOGASTRODUODENOSCOPY);  Surgeon: Reymundo Rangel MD;  Location: [facility];  Service: Endoscopy;  Laterality: N/A;  urgent EGD/EUS (radial) for gastric ulcerated mass (HGD)   Referring: Jan Lozano MD  10/17/23-Pt is no longer on Plavix, H/O CVA with left hemiparesis, instructions via portal-    PROSTATE SURGERY         Home Meds:   Prior to Admission medications    Medication Sig Start Date End Date Taking? Authorizing Provider   amoxicillin (AMOXIL) 500 MG capsule Take 500 mg by mouth 2 (two) times daily.  Patient not taking: Reported on 5/16/2024 1/4/24   Provider, Historical   atorvastatin  (LIPITOR) 20 MG tablet Take 1 tablet (20 mg total) by mouth once daily. 7/2/14 4/4/24  Lui Correa MD   bumetanide (BUMEX) 1 MG tablet Take 1 mg by mouth once daily. 9/1/23   Provider, Historical   docusate sodium (COLACE) 100 MG capsule Take 1 capsule (100 mg total) by mouth 2 (two) times daily as needed (while taking pain medication). 6/28/22   Valeriy Edmonds MD   hydroCHLOROthiazide (HYDRODIURIL) 25 MG tablet Take 25 mg by mouth once daily. 9/19/23   Provider, Historical   HYDROcodone-acetaminophen (NORCO) 5-325 mg per tablet Take 1 tablet by mouth every 6 (six) hours as needed for Pain. 10/3/24   Janine Koehler CNS   ibuprofen (ADVIL,MOTRIN) 800 MG tablet Take 1 tablet (800 mg total) by mouth every 8 (eight) hours as needed for Pain. 10/3/24 10/3/25  Janine Koehler, CNS   irbesartan (AVAPRO) 300 MG tablet Take 300 mg by mouth once daily. 9/19/23   Provider, Historical   multivitamin-minerals-lutein (MULTIVITAMIN 50 PLUS) Tab Take 1 tablet by mouth once daily.    Provider, Historical   nebivolol (BYSTOLIC) 10 MG Tab Take 1 tablet (10 mg total) by mouth once daily. 8/29/13   Tito Reyes MD   omeprazole (PRILOSEC OTC) 20 MG tablet Take 2 tablets (40 mg total) by mouth once daily. 9/30/23 5/16/24  Papi Michaud MD   oxybutynin (DITROPAN-XL) 10 MG 24 hr tablet Take 10 mg by mouth once daily. 9/8/23   Provider, Historical   potassium chloride SA (KLOR-CON M20) 20 MEQ tablet Take 1 tablet (20 mEq total) by mouth once daily. 8/29/13   Tito Reyes MD   promethazine-dextromethorphan (PROMETHAZINE-DM) 6.25-15 mg/5 mL Syrp Take 5 mLs by mouth every 6 (six) hours as needed.  Patient not taking: Reported on 5/16/2024 1/4/24   Provider, Historical   saw palmetto 500 MG capsule Take 500 mg by mouth once daily.    Provider, Historical   zinc gluconate 50 mg tablet Take 50 mg by mouth once daily.    Provider, Historical     Anticoagulants/Antiplatelets: no anticoagulation    Allergies:   Review of  patient's allergies indicates:   Allergen Reactions    Prednisone      Sedation History:  no adverse reactions            OBJECTIVE:     Vital Signs (Most Recent)       Physical Exam:  ASA: 2  Mallampati: 2    General: no acute distress  Mental Status: alert and oriented to person, place and time  HEENT: normocephalic, atraumatic  Chest: unlabored breathing  Heart: regular heart rate  Abdomen: nondistended  Extremity: moves all extremities    Laboratory  Lab Results   Component Value Date    INR 1.1 09/28/2023       Lab Results   Component Value Date    WBC 4.99 11/14/2024    HGB 10.7 (L) 11/14/2024    HCT 34.2 (L) 11/14/2024    MCV 93 11/14/2024     11/14/2024      Lab Results   Component Value Date    GLU 86 11/14/2024     11/14/2024    K 3.6 11/14/2024     11/14/2024    CO2 27 11/14/2024    BUN 28 11/14/2024    CREATININE 1.5 (H) 11/14/2024    CALCIUM 9.6 11/14/2024    MG 2.0 04/02/2013    ALT 27 11/14/2024    AST 45 (H) 11/14/2024    ALBUMIN 3.8 11/14/2024    BILITOT 0.3 11/14/2024       ASSESSMENT/PLAN:     Sedation Plan: moderate  Patient will undergo chest port placement.    Lui Hedrick MD  Interventional Radiology  Department of Radiology

## 2024-12-16 ENCOUNTER — TELEPHONE (OUTPATIENT)
Dept: HEMATOLOGY/ONCOLOGY | Facility: CLINIC | Age: 89
End: 2024-12-16
Payer: COMMERCIAL

## 2024-12-16 NOTE — TELEPHONE ENCOUNTER
Attempted to reach patient, no answer, unable to leave a voicemail. Lab appointment 12/24 needs be completed earlier in the day

## 2024-12-17 ENCOUNTER — PATIENT MESSAGE (OUTPATIENT)
Dept: HEMATOLOGY/ONCOLOGY | Facility: CLINIC | Age: 89
End: 2024-12-17
Payer: COMMERCIAL

## 2024-12-17 DIAGNOSIS — C16.3 MALIGNANT NEOPLASM OF PYLORIC ANTRUM: Primary | ICD-10-CM

## 2024-12-18 RX ORDER — LIDOCAINE AND PRILOCAINE 25; 25 MG/G; MG/G
CREAM TOPICAL
Qty: 30 G | Refills: 4 | Status: SHIPPED | OUTPATIENT
Start: 2024-12-18

## 2024-12-24 ENCOUNTER — HOSPITAL ENCOUNTER (OUTPATIENT)
Dept: RADIOLOGY | Facility: HOSPITAL | Age: 89
Discharge: HOME | End: 2024-12-24
Attending: INTERNAL MEDICINE
Payer: COMMERCIAL

## 2024-12-24 DIAGNOSIS — C16.3 MALIGNANT NEOPLASM OF PYLORIC ANTRUM: ICD-10-CM

## 2024-12-24 LAB
CREAT SERPL-MCNC: 1.5 MG/DL (ref 0.5–1.4)
SAMPLE: ABNORMAL

## 2024-12-24 PROCEDURE — A9698 NON-RAD CONTRAST MATERIALNOC: HCPCS | Performed by: INTERNAL MEDICINE

## 2024-12-24 PROCEDURE — 74177 CT ABD & PELVIS W/CONTRAST: CPT | Mod: TC

## 2024-12-24 PROCEDURE — 74177 CT ABD & PELVIS W/CONTRAST: CPT | Mod: 26,,, | Performed by: RADIOLOGY

## 2024-12-24 PROCEDURE — 71260 CT THORAX DX C+: CPT | Mod: 26,,, | Performed by: RADIOLOGY

## 2024-12-24 PROCEDURE — 25500020 PHARM REV CODE 255: Performed by: INTERNAL MEDICINE

## 2024-12-24 RX ADMIN — IOHEXOL 100 ML: 350 INJECTION, SOLUTION INTRAVENOUS at 09:12

## 2024-12-24 RX ADMIN — BARIUM SULFATE 450 ML: 21 SUSPENSION ORAL at 09:12

## 2024-12-26 ENCOUNTER — INFUSION (OUTPATIENT)
Dept: INFUSION THERAPY | Facility: HOSPITAL | Age: 89
End: 2024-12-26
Payer: COMMERCIAL

## 2024-12-26 ENCOUNTER — OFFICE VISIT (OUTPATIENT)
Dept: HEMATOLOGY/ONCOLOGY | Facility: CLINIC | Age: 89
End: 2024-12-26
Payer: COMMERCIAL

## 2024-12-26 VITALS
BODY MASS INDEX: 30.11 KG/M2 | OXYGEN SATURATION: 98 % | SYSTOLIC BLOOD PRESSURE: 169 MMHG | HEART RATE: 64 BPM | RESPIRATION RATE: 18 BRPM | DIASTOLIC BLOOD PRESSURE: 73 MMHG | TEMPERATURE: 97 F | WEIGHT: 198 LBS

## 2024-12-26 VITALS — HEIGHT: 68 IN | BODY MASS INDEX: 30.01 KG/M2 | WEIGHT: 198 LBS

## 2024-12-26 DIAGNOSIS — I63.9 CEREBROVASCULAR ACCIDENT (CVA), UNSPECIFIED MECHANISM: ICD-10-CM

## 2024-12-26 DIAGNOSIS — G89.3 NEOPLASM RELATED PAIN: ICD-10-CM

## 2024-12-26 DIAGNOSIS — C16.3 MALIGNANT NEOPLASM OF PYLORIC ANTRUM: Primary | ICD-10-CM

## 2024-12-26 DIAGNOSIS — D63.0 ANEMIA IN NEOPLASTIC DISEASE: ICD-10-CM

## 2024-12-26 DIAGNOSIS — D50.0 IRON DEFICIENCY ANEMIA DUE TO CHRONIC BLOOD LOSS: ICD-10-CM

## 2024-12-26 PROCEDURE — 99999 PR PBB SHADOW E&M-EST. PATIENT-LVL IV: CPT | Mod: PBBFAC,,, | Performed by: INTERNAL MEDICINE

## 2024-12-26 PROCEDURE — 25000003 PHARM REV CODE 250: Performed by: INTERNAL MEDICINE

## 2024-12-26 PROCEDURE — 96413 CHEMO IV INFUSION 1 HR: CPT

## 2024-12-26 PROCEDURE — A4216 STERILE WATER/SALINE, 10 ML: HCPCS | Performed by: INTERNAL MEDICINE

## 2024-12-26 PROCEDURE — 63600175 PHARM REV CODE 636 W HCPCS: Performed by: INTERNAL MEDICINE

## 2024-12-26 RX ORDER — HYDROCODONE BITARTRATE AND ACETAMINOPHEN 5; 325 MG/1; MG/1
1 TABLET ORAL EVERY 6 HOURS PRN
Qty: 120 TABLET | Refills: 0 | Status: SHIPPED | OUTPATIENT
Start: 2024-12-26

## 2024-12-26 RX ORDER — SODIUM CHLORIDE 0.9 % (FLUSH) 0.9 %
10 SYRINGE (ML) INJECTION
Status: CANCELLED | OUTPATIENT
Start: 2024-12-26

## 2024-12-26 RX ORDER — HEPARIN 100 UNIT/ML
500 SYRINGE INTRAVENOUS
Status: CANCELLED | OUTPATIENT
Start: 2024-12-26

## 2024-12-26 RX ORDER — DIPHENHYDRAMINE HYDROCHLORIDE 50 MG/ML
50 INJECTION INTRAMUSCULAR; INTRAVENOUS ONCE AS NEEDED
Status: CANCELLED | OUTPATIENT
Start: 2024-12-26

## 2024-12-26 RX ORDER — HEPARIN 100 UNIT/ML
500 SYRINGE INTRAVENOUS
Status: DISCONTINUED | OUTPATIENT
Start: 2024-12-26 | End: 2024-12-26 | Stop reason: HOSPADM

## 2024-12-26 RX ORDER — EPINEPHRINE 0.3 MG/.3ML
0.3 INJECTION SUBCUTANEOUS ONCE AS NEEDED
Status: DISCONTINUED | OUTPATIENT
Start: 2024-12-26 | End: 2024-12-26 | Stop reason: HOSPADM

## 2024-12-26 RX ORDER — PROCHLORPERAZINE EDISYLATE 5 MG/ML
5 INJECTION INTRAMUSCULAR; INTRAVENOUS ONCE AS NEEDED
Status: CANCELLED
Start: 2024-12-26

## 2024-12-26 RX ORDER — DIPHENHYDRAMINE HYDROCHLORIDE 50 MG/ML
50 INJECTION INTRAMUSCULAR; INTRAVENOUS ONCE AS NEEDED
Status: DISCONTINUED | OUTPATIENT
Start: 2024-12-26 | End: 2024-12-26 | Stop reason: HOSPADM

## 2024-12-26 RX ORDER — EPINEPHRINE 0.3 MG/.3ML
0.3 INJECTION SUBCUTANEOUS ONCE AS NEEDED
Status: CANCELLED | OUTPATIENT
Start: 2024-12-26

## 2024-12-26 RX ORDER — SODIUM CHLORIDE 0.9 % (FLUSH) 0.9 %
10 SYRINGE (ML) INJECTION
Status: DISCONTINUED | OUTPATIENT
Start: 2024-12-26 | End: 2024-12-26 | Stop reason: HOSPADM

## 2024-12-26 RX ORDER — PROCHLORPERAZINE EDISYLATE 5 MG/ML
5 INJECTION INTRAMUSCULAR; INTRAVENOUS ONCE AS NEEDED
Status: DISCONTINUED | OUTPATIENT
Start: 2024-12-26 | End: 2024-12-26 | Stop reason: HOSPADM

## 2024-12-26 RX ADMIN — SODIUM CHLORIDE: 9 INJECTION, SOLUTION INTRAVENOUS at 11:12

## 2024-12-26 RX ADMIN — HEPARIN SODIUM (PORCINE) LOCK FLUSH IV SOLN 100 UNIT/ML 500 UNITS: 100 SOLUTION at 12:12

## 2024-12-26 RX ADMIN — SODIUM CHLORIDE 400 MG: 9 INJECTION, SOLUTION INTRAVENOUS at 12:12

## 2024-12-26 RX ADMIN — Medication 10 ML: at 12:12

## 2024-12-26 NOTE — PLAN OF CARE
Pt arrived to clinic via wheelchair, accompanied by spouse. Port accessed. Pt tolerated keytruda infusion. VSS. Pt has no complaints. Port flushed and de-accessed. Pt escorted to exit by spouse via wheelchair.

## 2024-12-26 NOTE — PROGRESS NOTES
MEDICAL ONCOLOGY - ESTABLISHED PATIENT VISIT    Reason for visit: Gastric Adenocarcinoma     Best Contact Phone Number(s): [phone] (home)      Cancer/Stage/TNM:    Cancer Staging   Malignant neoplasm of pyloric antrum  Staging form: Stomach, AJCC 8th Edition  - Clinical stage from 10/20/2023: Stage III (cT3, cN1, cM0) - Signed by Parish Steinberg MD on 11/3/2023       Oncology History   Malignant neoplasm of pyloric antrum   10/20/2023 Cancer Staged    Staging form: Stomach, AJCC 8th Edition  - Clinical stage from 10/20/2023: Stage III (cT3, cN1, cM0)     11/2/2023 Initial Diagnosis    Malignant neoplasm of pyloric antrum     11/21/2023 -  Chemotherapy    Treatment Summary   Plan Name: OP PEMBROLIZUMAB 400MG Q6W  Treatment Goal: Palliative  Status: Active  Start Date: 11/21/2023  End Date: 9/18/2025 (Planned)  Provider: Parish Steinberg MD  Chemotherapy: [No matching medication found in this treatment plan]        History:   89 y.o. male presents for evaluation of newly diagnosed gastric adenocarcinoma. He presented to the ED in September 2023 with weakness and was found to have a Hg of 4.2. He underwent and EGD which revealed an ulcerative mass, biopsies revealed high-grade dysplasia. Had an EUS on 10/20/23 which revealed an ulcerating prepyloric mass (T3, possible N1, M0), biopsy positive for invasic adenocarcinoma. HER2 negative but dMMR (loss of MLH1 and PMS2). NGS and pharmacogenomics were sent. CT CAP on 10/31/23 revealed a circumferential pre-pyloric/pyloric thickening, there are two areas of either extension of the primary mass or enarlged lymph nodes.     Interval History: Mr Chin returns to the clinic prior to cycle 10 of immunotherapy with pembrolizumab for his MSI-H gastric cancer.  No new issues other than his chronic L shouler and neck pain.  Continues with Norco and ibuprofen PRN. No diarrhea or dyspnea.      Patient presents with his daughter and son. ECOG PS is 1.     ROS:   Review  of Systems   Constitutional:  Negative for chills and fever.   HENT:  Negative for congestion and sore throat.    Eyes:  Negative for pain.   Respiratory:  Negative for cough and shortness of breath.    Cardiovascular:  Negative for chest pain, palpitations and leg swelling.   Gastrointestinal:  Negative for abdominal pain, diarrhea, heartburn, nausea and vomiting.   Genitourinary:  Negative for dysuria.   Musculoskeletal:  Positive for joint pain (L shoulder) and myalgias. Negative for back pain.   Neurological:  Positive for weakness (left side). Negative for dizziness and headaches.   Psychiatric/Behavioral:  Negative for suicidal ideas. The patient is not nervous/anxious.        Past Medical History:   Past Medical History:   Diagnosis Date    Arthritis     In back, neck    Bell's palsy feb or march 2013    Blood clot in vein     right leg after back surgery    Cataract associated with other syndromes     GERD (gastroesophageal reflux disease)     Hypertension     Other and unspecified hyperlipidemia     Stroke     TIA (transient ischemic attack)     Feb or march 2013        Past Surgical History:   Past Surgical History:   Procedure Laterality Date    APPENDECTOMY      BACK SURGERY      COLONOSCOPY N/A 9/29/2023    Procedure: COLONOSCOPY;  Surgeon: Jan Lozano MD;  Location: [facility];  Service: Gastroenterology;  Laterality: N/A;    ENDOSCOPIC ULTRASOUND OF UPPER GASTROINTESTINAL TRACT N/A 10/20/2023    Procedure: ULTRASOUND, UPPER GI TRACT, ENDOSCOPIC;  Surgeon: Reymundo Rangel MD;  Location: [facility];  Service: Endoscopy;  Laterality: N/A;    ESOPHAGOGASTRODUODENOSCOPY N/A 9/29/2023    Procedure: EGD (ESOPHAGOGASTRODUODENOSCOPY);  Surgeon: Jan Lozano MD;  Location: [facility];  Service: Gastroenterology;  Laterality: N/A;    ESOPHAGOGASTRODUODENOSCOPY N/A 10/20/2023    Procedure: EGD (ESOPHAGOGASTRODUODENOSCOPY);  Surgeon: Reymundo Rangel MD;  Location: [facility];   Service: Endoscopy;  Laterality: N/A;  urgent EGD/EUS (radial) for gastric ulcerated mass (HGD)   Referring: Jan Lozano MD  10/17/23-Pt is no longer on Plavix, H/O CVA with left hemiparesis, instructions via portal-DS    PROSTATE SURGERY          Family History:   Family History   Problem Relation Name Age of Onset    Stroke Father      Hypertension Son          Social History:   Social History     Tobacco Use    Smoking status: Former     Current packs/day: 0.00     Types: Cigarettes     Quit date: 1955     Years since quittin.0    Smokeless tobacco: Not on file   Substance Use Topics    Alcohol use: No        I have reviewed and updated the patient's past medical, surgical, family and social histories.    Allergies:   Review of patient's allergies indicates:   Allergen Reactions    Prednisone         Medications:   Current Outpatient Medications   Medication Sig Dispense Refill    amoxicillin (AMOXIL) 500 MG capsule Take 500 mg by mouth 2 (two) times daily. (Patient not taking: Reported on 2024)      atorvastatin (LIPITOR) 20 MG tablet Take 1 tablet (20 mg total) by mouth once daily. 30 tablet 1    bumetanide (BUMEX) 1 MG tablet Take 1 mg by mouth once daily.      docusate sodium (COLACE) 100 MG capsule Take 1 capsule (100 mg total) by mouth 2 (two) times daily as needed (while taking pain medication). 20 capsule 0    hydroCHLOROthiazide (HYDRODIURIL) 25 MG tablet Take 25 mg by mouth once daily.      HYDROcodone-acetaminophen (NORCO) 5-325 mg per tablet Take 1 tablet by mouth every 6 (six) hours as needed for Pain. 120 tablet 0    ibuprofen (ADVIL,MOTRIN) 800 MG tablet Take 1 tablet (800 mg total) by mouth every 8 (eight) hours as needed for Pain. 90 tablet 2    irbesartan (AVAPRO) 300 MG tablet Take 300 mg by mouth once daily.      LIDOcaine-prilocaine (EMLA) cream Place to port site 45-60 minutes prior to chemotherapy. 30 g 4    multivitamin-minerals-lutein (MULTIVITAMIN 50 PLUS) Tab  Take 1 tablet by mouth once daily.      nebivolol (BYSTOLIC) 10 MG Tab Take 1 tablet (10 mg total) by mouth once daily. 30 tablet 11    omeprazole (PRILOSEC OTC) 20 MG tablet Take 2 tablets (40 mg total) by mouth once daily. 122 tablet 0    oxybutynin (DITROPAN-XL) 10 MG 24 hr tablet Take 10 mg by mouth once daily.      potassium chloride SA (KLOR-CON M20) 20 MEQ tablet Take 1 tablet (20 mEq total) by mouth once daily. 30 tablet 11    promethazine-dextromethorphan (PROMETHAZINE-DM) 6.25-15 mg/5 mL Syrp Take 5 mLs by mouth every 6 (six) hours as needed. (Patient not taking: Reported on 5/16/2024)      saw palmetto 500 MG capsule Take 500 mg by mouth once daily.      zinc gluconate 50 mg tablet Take 50 mg by mouth once daily.       No current facility-administered medications for this visit.     Facility-Administered Medications Ordered in Other Visits   Medication Dose Route Frequency Provider Last Rate Last Admin    alteplase injection 2 mg  2 mg Intra-Catheter PRN Parish Steinberg MD        diphenhydrAMINE injection 50 mg  50 mg Intravenous Once PRN Parish Steinberg MD        EPINEPHrine (EPIPEN) 0.3 mg/0.3 mL pen injection 0.3 mg  0.3 mg Intramuscular Once PRN Parish Steinberg MD        heparin, porcine (PF) 100 unit/mL injection flush 500 Units  500 Units Intravenous PRN Parish Steinberg MD   500 Units at 12/26/24 1234    hydrocortisone sodium succinate injection 100 mg  100 mg Intravenous Once PRN Parish Steinberg MD        prochlorperazine injection Soln 5 mg  5 mg Intravenous Once PRN Parish Steinberg MD        sodium chloride 0.9% flush 10 mL  10 mL Intravenous PRN Parish Steinberg MD   10 mL at 12/26/24 1234        Physical Exam:   BP (!) 169/73 (BP Location: Left arm, Patient Position: Sitting)   Pulse 64   Temp 97 °F (36.1 °C) (Temporal)   Resp 18   Wt 89.8 kg (198 lb)   SpO2 98%   BMI 30.11 kg/m²            Physical Exam  Constitutional:       Appearance: Normal  appearance.   HENT:      Head: Normocephalic and atraumatic.      Mouth/Throat:      Mouth: Mucous membranes are moist.      Pharynx: Oropharynx is clear.   Eyes:      Extraocular Movements: Extraocular movements intact.      Pupils: Pupils are equal, round, and reactive to light.   Cardiovascular:      Rate and Rhythm: Normal rate and regular rhythm.      Pulses: Normal pulses.      Heart sounds: Normal heart sounds.   Pulmonary:      Effort: Pulmonary effort is normal.      Breath sounds: Normal breath sounds.   Abdominal:      General: Abdomen is flat.      Palpations: Abdomen is soft.      Tenderness: There is no abdominal tenderness.   Musculoskeletal:         General: Normal range of motion.      Cervical back: Normal range of motion and neck supple.      Right lower leg: No edema.      Left lower leg: No edema.   Skin:     General: Skin is warm and dry.   Neurological:      General: No focal deficit present.      Mental Status: He is alert and oriented to person, place, and time.      Motor: Weakness (left side slightly weaker than right) present.   Psychiatric:         Mood and Affect: Mood normal.         Behavior: Behavior normal.           Labs:   No results found for this or any previous visit (from the past 48 hours).      Imagin/24/24 - CT CAP:    Impression:     Stable focal wall thickening in the gastric antrum.  Stable subcentimeter perigastric lymph node.     No evidence of distant metastatic disease.    Path:   10/20/23 - Gastric biopsy with invasive adenocarcinoma. Loss of nuclear expression of MLH1 and PMS2.       Assessment:       1. Malignant neoplasm of pyloric antrum    2. Neoplasm related pain    3. Iron deficiency anemia due to chronic blood loss    4. Anemia in neoplastic disease    5. Cerebrovascular accident (CVA), unspecified mechanism         Plan:        # Malignant Neoplasm of Pyloric Antrum   Patient diagnosed with gastric adenocarcinoma on 10/20/23. No signs of  metastasis on imaging. Patient is not a candidate for surgery or chemotherapy given his age and co-morbidities. His tumor was MSI-H (loss of nuclear expression of MLH1 and PMS2) so he is a candidate for immunotherapy. Spoke with patient and his family about the risks and benefits of immunotherapy and they are in agreement with starting it.     Began cycle 1 of q6 week Keytruda on 11/21/23.  Tolerating well. Eating well and has a good appetite  Anemia improving.  CT CAP in April showed improvement in the thickening of the gastric antrum and adjacent lymph nodes. Good response, recommend to c/w with Keytruda.  CT CAP in September 2024 showed stable disease.   CT CAP in December 2024 showed stable disease.    Presents prior to cycle 10.  Lab work reviewed and adequate for treatment   Proceed with cycle of Keytruda 400mg.    - RTC in 6 weeks for cycle 11.     # JOSE JUAN, anemia in neoplastic disease, neoplastic related fatigue  - Secondary to blood loss from gastric mass. Improved.   - Received Injectafer.  - will monitor TSH while receiving Pembro  - BP elevated; asymptomatic.    # CVA, arthralgias  - some residual deficits but remains ambulatory with assistive devices at home.  - Arthralgias may be immune-related, but he wants to continue without steroids.  Will continue NSAIDs.    Follow up: 6 weeks for cycle 11.     Patient is in agreement with the proposed treatment plan. All questions were answered to the patient's satisfaction. Pt knows to call clinic if anything is needed before the next clinic visit.    Parish Steinberg MD  Hematology/Oncology  [facility]      Route Chart for Scheduling    Med Onc Chart Routing      Follow up with physician 6 weeks.   Follow up with LANDON    Infusion scheduling note    Injection scheduling note    Labs CBC, CMP and TSH   Scheduling:  Preferred lab:  Lab interval:     Imaging    Pharmacy appointment    Other referrals              Treatment Plan Information   OP  PEMBROLIZUMAB 400MG Q6W Parish Steinberg MD   Associated diagnosis: Malignant neoplasm of pyloric antrum Stage III cT3, cN1, cM0 noted on 11/2/2023   Line of treatment: First Line  Treatment Goal: Palliative     Upcoming Treatment Dates - OP PEMBROLIZUMAB 400MG Q6W    1/9/2025       Chemotherapy       pembrolizumab (KEYTRUDA) 400 mg in 0.9% NaCl SolP 116 mL infusion  2/20/2025       Chemotherapy       pembrolizumab (KEYTRUDA) 400 mg in 0.9% NaCl SolP 116 mL infusion  4/3/2025       Chemotherapy       pembrolizumab (KEYTRUDA) 400 mg in 0.9% NaCl SolP 116 mL infusion  5/15/2025       Chemotherapy       pembrolizumab (KEYTRUDA) 400 mg in 0.9% NaCl SolP 116 mL infusion    Therapy Plan Information  INJECTAFER (FERRIC CARBOXYMALTOSE) for 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    No therapy plan of the specified type found.

## 2024-12-27 DIAGNOSIS — R53.83 FATIGUE, UNSPECIFIED TYPE: Primary | ICD-10-CM

## 2025-02-10 ENCOUNTER — TELEPHONE (OUTPATIENT)
Dept: HEMATOLOGY/ONCOLOGY | Facility: CLINIC | Age: OVER 89
End: 2025-02-10

## 2025-02-10 ENCOUNTER — OFFICE VISIT (OUTPATIENT)
Dept: HEMATOLOGY/ONCOLOGY | Facility: CLINIC | Age: OVER 89
End: 2025-02-10
Payer: COMMERCIAL

## 2025-02-10 ENCOUNTER — LAB VISIT (OUTPATIENT)
Dept: LAB | Facility: HOSPITAL | Age: OVER 89
End: 2025-02-10
Payer: COMMERCIAL

## 2025-02-10 VITALS
BODY MASS INDEX: 31.61 KG/M2 | OXYGEN SATURATION: 99 % | HEART RATE: 62 BPM | DIASTOLIC BLOOD PRESSURE: 80 MMHG | WEIGHT: 201.38 LBS | HEIGHT: 67 IN | SYSTOLIC BLOOD PRESSURE: 191 MMHG

## 2025-02-10 DIAGNOSIS — C16.3 MALIGNANT NEOPLASM OF PYLORIC ANTRUM: ICD-10-CM

## 2025-02-10 DIAGNOSIS — D63.0 ANEMIA IN NEOPLASTIC DISEASE: ICD-10-CM

## 2025-02-10 DIAGNOSIS — M25.542 ARTHRALGIA OF BOTH HANDS: ICD-10-CM

## 2025-02-10 DIAGNOSIS — D50.0 IRON DEFICIENCY ANEMIA DUE TO CHRONIC BLOOD LOSS: ICD-10-CM

## 2025-02-10 DIAGNOSIS — G89.3 NEOPLASM RELATED PAIN: ICD-10-CM

## 2025-02-10 DIAGNOSIS — I63.9 CEREBROVASCULAR ACCIDENT (CVA), UNSPECIFIED MECHANISM: ICD-10-CM

## 2025-02-10 DIAGNOSIS — M25.541 ARTHRALGIA OF BOTH HANDS: ICD-10-CM

## 2025-02-10 DIAGNOSIS — R53.83 FATIGUE, UNSPECIFIED TYPE: ICD-10-CM

## 2025-02-10 DIAGNOSIS — C16.3 MALIGNANT NEOPLASM OF PYLORIC ANTRUM: Primary | ICD-10-CM

## 2025-02-10 LAB
ALBUMIN SERPL BCP-MCNC: 3.8 G/DL (ref 3.5–5.2)
ALP SERPL-CCNC: 60 U/L (ref 40–150)
ALT SERPL W/O P-5'-P-CCNC: 31 U/L (ref 10–44)
ANION GAP SERPL CALC-SCNC: 11 MMOL/L (ref 8–16)
AST SERPL-CCNC: 44 U/L (ref 10–40)
BILIRUB SERPL-MCNC: 0.4 MG/DL (ref 0.1–1)
BUN SERPL-MCNC: 28 MG/DL (ref 10–30)
CALCIUM SERPL-MCNC: 9.2 MG/DL (ref 8.7–10.5)
CHLORIDE SERPL-SCNC: 103 MMOL/L (ref 95–110)
CO2 SERPL-SCNC: 28 MMOL/L (ref 23–29)
CREAT SERPL-MCNC: 1.2 MG/DL (ref 0.5–1.4)
ERYTHROCYTE [DISTWIDTH] IN BLOOD BY AUTOMATED COUNT: 14.8 % (ref 11.5–14.5)
EST. GFR  (NO RACE VARIABLE): 57.1 ML/MIN/1.73 M^2
GLUCOSE SERPL-MCNC: 105 MG/DL (ref 70–110)
HCT VFR BLD AUTO: 34.6 % (ref 40–54)
HGB BLD-MCNC: 11.3 G/DL (ref 14–18)
IMM GRANULOCYTES # BLD AUTO: 0.01 K/UL (ref 0–0.04)
MCH RBC QN AUTO: 29.3 PG (ref 27–31)
MCHC RBC AUTO-ENTMCNC: 32.7 G/DL (ref 32–36)
MCV RBC AUTO: 90 FL (ref 82–98)
NEUTROPHILS # BLD AUTO: 3.3 K/UL (ref 1.8–7.7)
PLATELET # BLD AUTO: 169 K/UL (ref 150–450)
PMV BLD AUTO: 10.4 FL (ref 9.2–12.9)
POTASSIUM SERPL-SCNC: 3.6 MMOL/L (ref 3.5–5.1)
PROT SERPL-MCNC: 7.3 G/DL (ref 6–8.4)
RBC # BLD AUTO: 3.86 M/UL (ref 4.6–6.2)
SODIUM SERPL-SCNC: 142 MMOL/L (ref 136–145)
TSH SERPL DL<=0.005 MIU/L-ACNC: 1.84 UIU/ML (ref 0.4–4)
WBC # BLD AUTO: 5.1 K/UL (ref 3.9–12.7)

## 2025-02-10 PROCEDURE — 36415 COLL VENOUS BLD VENIPUNCTURE: CPT | Performed by: INTERNAL MEDICINE

## 2025-02-10 PROCEDURE — 85027 COMPLETE CBC AUTOMATED: CPT | Performed by: INTERNAL MEDICINE

## 2025-02-10 PROCEDURE — 80053 COMPREHEN METABOLIC PANEL: CPT | Performed by: INTERNAL MEDICINE

## 2025-02-10 PROCEDURE — 99999 PR PBB SHADOW E&M-EST. PATIENT-LVL III: CPT | Mod: PBBFAC,,, | Performed by: INTERNAL MEDICINE

## 2025-02-10 PROCEDURE — 84443 ASSAY THYROID STIM HORMONE: CPT | Performed by: INTERNAL MEDICINE

## 2025-02-10 RX ORDER — HYDROCODONE BITARTRATE AND ACETAMINOPHEN 5; 325 MG/1; MG/1
1 TABLET ORAL EVERY 6 HOURS PRN
Qty: 120 TABLET | Refills: 0 | Status: SHIPPED | OUTPATIENT
Start: 2025-02-10

## 2025-02-10 RX ORDER — IBUPROFEN 800 MG/1
800 TABLET ORAL EVERY 8 HOURS PRN
Qty: 90 TABLET | Refills: 2 | Status: SHIPPED | OUTPATIENT
Start: 2025-02-10 | End: 2026-02-10

## 2025-02-10 RX ORDER — PROCHLORPERAZINE EDISYLATE 5 MG/ML
5 INJECTION INTRAMUSCULAR; INTRAVENOUS ONCE AS NEEDED
Status: CANCELLED
Start: 2025-02-10

## 2025-02-10 RX ORDER — DIPHENHYDRAMINE HYDROCHLORIDE 50 MG/ML
50 INJECTION INTRAMUSCULAR; INTRAVENOUS ONCE AS NEEDED
Status: CANCELLED | OUTPATIENT
Start: 2025-02-10

## 2025-02-10 RX ORDER — SODIUM CHLORIDE 0.9 % (FLUSH) 0.9 %
10 SYRINGE (ML) INJECTION
Status: CANCELLED | OUTPATIENT
Start: 2025-02-10

## 2025-02-10 RX ORDER — EPINEPHRINE 0.3 MG/.3ML
0.3 INJECTION SUBCUTANEOUS ONCE AS NEEDED
Status: CANCELLED | OUTPATIENT
Start: 2025-02-10

## 2025-02-10 RX ORDER — HEPARIN 100 UNIT/ML
500 SYRINGE INTRAVENOUS
Status: CANCELLED | OUTPATIENT
Start: 2025-02-10

## 2025-02-10 NOTE — TELEPHONE ENCOUNTER
Called daughter to inform her of patient's appointment scheduled for tomorrow at 2 pm for keytruda.  Daughter verbalized understanding and confirmed appointment.

## 2025-02-10 NOTE — PROGRESS NOTES
MEDICAL ONCOLOGY - ESTABLISHED PATIENT VISIT    Reason for visit: Gastric Adenocarcinoma     Best Contact Phone Number(s): [phone] (home)      Cancer/Stage/TNM:    Cancer Staging   Malignant neoplasm of pyloric antrum  Staging form: Stomach, AJCC 8th Edition  - Clinical stage from 10/20/2023: Stage III (cT3, cN1, cM0) - Signed by Parish Steinberg MD on 11/3/2023       Oncology History   Malignant neoplasm of pyloric antrum   10/20/2023 Cancer Staged    Staging form: Stomach, AJCC 8th Edition  - Clinical stage from 10/20/2023: Stage III (cT3, cN1, cM0)     11/2/2023 Initial Diagnosis    Malignant neoplasm of pyloric antrum     11/21/2023 -  Chemotherapy    Treatment Summary   Plan Name: OP PEMBROLIZUMAB 400MG Q6W  Treatment Goal: Palliative  Status: Active  Start Date: 11/21/2023  End Date: 9/18/2025 (Planned)  Provider: Parish Steinberg MD  Chemotherapy: [No matching medication found in this treatment plan]        History:   89 y.o. male presents for evaluation of newly diagnosed gastric adenocarcinoma. He presented to the ED in September 2023 with weakness and was found to have a Hg of 4.2. He underwent and EGD which revealed an ulcerative mass, biopsies revealed high-grade dysplasia. Had an EUS on 10/20/23 which revealed an ulcerating prepyloric mass (T3, possible N1, M0), biopsy positive for invasic adenocarcinoma. HER2 negative but dMMR (loss of MLH1 and PMS2). NGS and pharmacogenomics were sent. CT CAP on 10/31/23 revealed a circumferential pre-pyloric/pyloric thickening, there are two areas of either extension of the primary mass or enarlged lymph nodes.     Interval History:   Mr Chin returns to the clinic prior to cycle 11 of pembrolizumab for his MSI-H gastric cancer.  He is feeling well other than his chronic L shoulder pain.  No diarrhea or dyspnea.      Patient presents with his daughter and son. ECOG PS is 1.     ROS:   Review of Systems   Constitutional:  Negative for chills and  fever.   HENT:  Negative for congestion and sore throat.    Eyes:  Negative for pain.   Respiratory:  Negative for cough and shortness of breath.    Cardiovascular:  Negative for chest pain, palpitations and leg swelling.   Gastrointestinal:  Negative for abdominal pain, diarrhea, heartburn, nausea and vomiting.   Genitourinary:  Negative for dysuria.   Musculoskeletal:  Positive for joint pain (L shoulder) and myalgias. Negative for back pain.   Neurological:  Positive for weakness (left side). Negative for dizziness and headaches.   Psychiatric/Behavioral:  Negative for suicidal ideas. The patient is not nervous/anxious.        Past Medical History:   Past Medical History:   Diagnosis Date    Arthritis     In back, neck    Bell's palsy feb or march 2013    Blood clot in vein     right leg after back surgery    Cataract associated with other syndromes     GERD (gastroesophageal reflux disease)     Hypertension     Other and unspecified hyperlipidemia     Stroke     TIA (transient ischemic attack)     Feb or march 2013        Past Surgical History:   Past Surgical History:   Procedure Laterality Date    APPENDECTOMY      BACK SURGERY      COLONOSCOPY N/A 9/29/2023    Procedure: COLONOSCOPY;  Surgeon: Jan Lozano MD;  Location: [facility];  Service: Gastroenterology;  Laterality: N/A;    ENDOSCOPIC ULTRASOUND OF UPPER GASTROINTESTINAL TRACT N/A 10/20/2023    Procedure: ULTRASOUND, UPPER GI TRACT, ENDOSCOPIC;  Surgeon: Reymundo Rangel MD;  Location: [facility];  Service: Endoscopy;  Laterality: N/A;    ESOPHAGOGASTRODUODENOSCOPY N/A 9/29/2023    Procedure: EGD (ESOPHAGOGASTRODUODENOSCOPY);  Surgeon: Jan Lozano MD;  Location: [facility];  Service: Gastroenterology;  Laterality: N/A;    ESOPHAGOGASTRODUODENOSCOPY N/A 10/20/2023    Procedure: EGD (ESOPHAGOGASTRODUODENOSCOPY);  Surgeon: Reymundo Rangel MD;  Location: [facility];  Service: Endoscopy;  Laterality: N/A;  urgent EGD/EUS  (radial) for gastric ulcerated mass (HGD)   Referring: Jan Lozano MD  10/17/23-Pt is no longer on Plavix, H/O CVA with left hemiparesis, instructions via portal-DS    PROSTATE SURGERY          Family History:   Family History   Problem Relation Name Age of Onset    Stroke Father      Hypertension Son          Social History:   Social History     Tobacco Use    Smoking status: Former     Current packs/day: 0.00     Types: Cigarettes     Quit date: 1955     Years since quittin.1    Smokeless tobacco: Not on file   Substance Use Topics    Alcohol use: No        I have reviewed and updated the patient's past medical, surgical, family and social histories.    Allergies:   Review of patient's allergies indicates:   Allergen Reactions    Prednisone         Medications:   Current Outpatient Medications   Medication Sig Dispense Refill    bumetanide (BUMEX) 1 MG tablet Take 1 mg by mouth once daily.      docusate sodium (COLACE) 100 MG capsule Take 1 capsule (100 mg total) by mouth 2 (two) times daily as needed (while taking pain medication). 20 capsule 0    hydroCHLOROthiazide (HYDRODIURIL) 25 MG tablet Take 25 mg by mouth once daily.      irbesartan (AVAPRO) 300 MG tablet Take 300 mg by mouth once daily.      LIDOcaine-prilocaine (EMLA) cream Place to port site 45-60 minutes prior to chemotherapy. 30 g 4    multivitamin-minerals-lutein (MULTIVITAMIN 50 PLUS) Tab Take 1 tablet by mouth once daily.      nebivolol (BYSTOLIC) 10 MG Tab Take 1 tablet (10 mg total) by mouth once daily. 30 tablet 11    oxybutynin (DITROPAN-XL) 10 MG 24 hr tablet Take 10 mg by mouth once daily.      potassium chloride SA (KLOR-CON M20) 20 MEQ tablet Take 1 tablet (20 mEq total) by mouth once daily. 30 tablet 11    saw palmetto 500 MG capsule Take 500 mg by mouth once daily.      zinc gluconate 50 mg tablet Take 50 mg by mouth once daily.      amoxicillin (AMOXIL) 500 MG capsule Take 500 mg by mouth 2 (two) times daily. (Patient  "not taking: Reported on 2/10/2025)      atorvastatin (LIPITOR) 20 MG tablet Take 1 tablet (20 mg total) by mouth once daily. 30 tablet 1    HYDROcodone-acetaminophen (NORCO) 5-325 mg per tablet Take 1 tablet by mouth every 6 (six) hours as needed for Pain. 120 tablet 0    ibuprofen (ADVIL,MOTRIN) 800 MG tablet Take 1 tablet (800 mg total) by mouth every 8 (eight) hours as needed for Pain. 90 tablet 2    omeprazole (PRILOSEC OTC) 20 MG tablet Take 2 tablets (40 mg total) by mouth once daily. 122 tablet 0    promethazine-dextromethorphan (PROMETHAZINE-DM) 6.25-15 mg/5 mL Syrp Take 5 mLs by mouth every 6 (six) hours as needed. (Patient not taking: Reported on 2/10/2025)       No current facility-administered medications for this visit.        Physical Exam:   BP (!) 191/80 (Patient Position: Sitting)   Pulse 62   Ht 5' 7" (1.702 m)   Wt 91.3 kg (201 lb 6.2 oz)   SpO2 99%   BMI 31.54 kg/m²            Physical Exam  Constitutional:       Appearance: Normal appearance.   HENT:      Head: Normocephalic and atraumatic.      Mouth/Throat:      Mouth: Mucous membranes are moist.      Pharynx: Oropharynx is clear.   Eyes:      Extraocular Movements: Extraocular movements intact.      Pupils: Pupils are equal, round, and reactive to light.   Cardiovascular:      Rate and Rhythm: Normal rate and regular rhythm.      Pulses: Normal pulses.      Heart sounds: Normal heart sounds.   Pulmonary:      Effort: Pulmonary effort is normal.      Breath sounds: Normal breath sounds.   Abdominal:      General: Abdomen is flat.      Palpations: Abdomen is soft.      Tenderness: There is no abdominal tenderness.   Musculoskeletal:         General: Normal range of motion.      Cervical back: Normal range of motion and neck supple.      Right lower leg: No edema.      Left lower leg: No edema.   Skin:     General: Skin is warm and dry.   Neurological:      General: No focal deficit present.      Mental Status: He is alert and oriented to " person, place, and time.      Motor: Weakness (left side slightly weaker than right) present.   Psychiatric:         Mood and Affect: Mood normal.         Behavior: Behavior normal.           Labs:   Recent Results (from the past 48 hours)   CBC Oncology    Collection Time: 02/10/25 12:29 PM   Result Value Ref Range    WBC 5.10 3.90 - 12.70 K/uL    RBC 3.86 (L) 4.60 - 6.20 M/uL    Hemoglobin 11.3 (L) 14.0 - 18.0 g/dL    Hematocrit 34.6 (L) 40.0 - 54.0 %    MCV 90 82 - 98 fL    MCH 29.3 27.0 - 31.0 pg    MCHC 32.7 32.0 - 36.0 g/dL    RDW 14.8 (H) 11.5 - 14.5 %    Platelets 169 150 - 450 K/uL    MPV 10.4 9.2 - 12.9 fL    Gran # (ANC) 3.3 1.8 - 7.7 K/uL    Immature Grans (Abs) 0.01 0.00 - 0.04 K/uL   Comprehensive Metabolic Panel    Collection Time: 02/10/25 12:29 PM   Result Value Ref Range    Sodium 142 136 - 145 mmol/L    Potassium 3.6 3.5 - 5.1 mmol/L    Chloride 103 95 - 110 mmol/L    CO2 28 23 - 29 mmol/L    Glucose 105 70 - 110 mg/dL    BUN 28 10 - 30 mg/dL    Creatinine 1.2 0.5 - 1.4 mg/dL    Calcium 9.2 8.7 - 10.5 mg/dL    Total Protein 7.3 6.0 - 8.4 g/dL    Albumin 3.8 3.5 - 5.2 g/dL    Total Bilirubin 0.4 0.1 - 1.0 mg/dL    Alkaline Phosphatase 60 40 - 150 U/L    AST 44 (H) 10 - 40 U/L    ALT 31 10 - 44 U/L    eGFR 57.1 (A) >60 mL/min/1.73 m^2    Anion Gap 11 8 - 16 mmol/L   TSH    Collection Time: 02/10/25 12:29 PM   Result Value Ref Range    TSH 1.843 0.400 - 4.000 uIU/mL         Imagin/24/24 - CT CAP:    Impression:     Stable focal wall thickening in the gastric antrum.  Stable subcentimeter perigastric lymph node.     No evidence of distant metastatic disease.    Path:   10/20/23 - Gastric biopsy with invasive adenocarcinoma. Loss of nuclear expression of MLH1 and PMS2.       Assessment:       1. Malignant neoplasm of pyloric antrum    2. Iron deficiency anemia due to chronic blood loss    3. Anemia in neoplastic disease    4. Fatigue, unspecified type    5. Cerebrovascular accident (CVA),  unspecified mechanism    6. Arthralgia of both hands    7. Neoplasm related pain           Plan:        # Malignant Neoplasm of Pyloric Antrum   Patient diagnosed with gastric adenocarcinoma on 10/20/23. No signs of metastasis on imaging. Patient is not a candidate for surgery or chemotherapy given his age and co-morbidities. His tumor was MSI-H (loss of nuclear expression of MLH1 and PMS2) so he is a candidate for immunotherapy. Spoke with patient and his family about the risks and benefits of immunotherapy and they are in agreement with starting it.     Began cycle 1 of q6 week Keytruda on 11/21/23.  Tolerating well. Eating well and has a good appetite  Anemia improving.  CT CAP in April showed improvement in the thickening of the gastric antrum and adjacent lymph nodes. Good response, recommend to c/w with Keytruda.  CT CAP in September 2024 showed stable disease.   CT CAP in December 2024 showed stable disease.    Presents prior to cycle 11.  Lab work reviewed and adequate for treatment   Proceed with cycle of Keytruda 400mg.    - RTC in 6 weeks for cycle 12.     # JOSE JUAN, anemia in neoplastic disease, neoplastic related fatigue  - Secondary to blood loss from gastric mass. Improved.   - Received Injectafer.  - will monitor TSH while receiving Pembro  - BP elevated; asymptomatic.  Did not take his BP medication this morning.    # CVA, arthralgias  - some residual deficits but remains ambulatory with assistive devices at home.  - Arthralgias unlikely to be immune-mediated; Will continue NSAIDs and PRN Norco.    Follow up: 6 weeks for cycle 12.     Patient is in agreement with the proposed treatment plan. All questions were answered to the patient's satisfaction. Pt knows to call clinic if anything is needed before the next clinic visit.    Parish Steinberg MD  Hematology/Oncology  [facility]      Route Chart for Scheduling    Med Onc Chart Routing      Follow up with physician 3 months. for  keytruda   Follow up with LANDON 6 weeks. for keytruda   Infusion scheduling note    Injection scheduling note    Labs CBC, CMP and TSH   Scheduling:  Preferred lab:  Lab interval:     Imaging    Pharmacy appointment    Other referrals              Treatment Plan Information   OP PEMBROLIZUMAB 400MG Q6W Parish Steinberg MD   Associated diagnosis: Malignant neoplasm of pyloric antrum Stage III cT3, cN1, cM0 noted on 11/2/2023   Line of treatment: First Line  Treatment Goal: Palliative     Upcoming Treatment Dates - OP PEMBROLIZUMAB 400MG Q6W    1/9/2025       Chemotherapy       pembrolizumab (KEYTRUDA) 400 mg in 0.9% NaCl SolP 116 mL infusion  2/20/2025       Chemotherapy       pembrolizumab (KEYTRUDA) 400 mg in 0.9% NaCl SolP 116 mL infusion  4/3/2025       Chemotherapy       pembrolizumab (KEYTRUDA) 400 mg in 0.9% NaCl SolP 116 mL infusion  5/15/2025       Chemotherapy       pembrolizumab (KEYTRUDA) 400 mg in 0.9% NaCl SolP 116 mL infusion    Therapy Plan Information  INJECTAFER (FERRIC CARBOXYMALTOSE) for 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    No therapy plan of the specified type found.

## 2025-02-11 ENCOUNTER — INFUSION (OUTPATIENT)
Dept: INFUSION THERAPY | Facility: HOSPITAL | Age: OVER 89
End: 2025-02-11
Payer: COMMERCIAL

## 2025-02-11 VITALS
DIASTOLIC BLOOD PRESSURE: 68 MMHG | BODY MASS INDEX: 31.61 KG/M2 | HEIGHT: 67 IN | TEMPERATURE: 99 F | OXYGEN SATURATION: 96 % | WEIGHT: 201.38 LBS | SYSTOLIC BLOOD PRESSURE: 158 MMHG | RESPIRATION RATE: 19 BRPM | HEART RATE: 60 BPM

## 2025-02-11 DIAGNOSIS — C16.3 MALIGNANT NEOPLASM OF PYLORIC ANTRUM: Primary | ICD-10-CM

## 2025-02-11 PROCEDURE — 25000003 PHARM REV CODE 250: Performed by: INTERNAL MEDICINE

## 2025-02-11 PROCEDURE — 63600175 PHARM REV CODE 636 W HCPCS: Mod: JZ,TB | Performed by: INTERNAL MEDICINE

## 2025-02-11 PROCEDURE — 96413 CHEMO IV INFUSION 1 HR: CPT

## 2025-02-11 RX ORDER — SODIUM CHLORIDE 0.9 % (FLUSH) 0.9 %
10 SYRINGE (ML) INJECTION
Status: DISCONTINUED | OUTPATIENT
Start: 2025-02-11 | End: 2025-02-11 | Stop reason: HOSPADM

## 2025-02-11 RX ORDER — DIPHENHYDRAMINE HYDROCHLORIDE 50 MG/ML
50 INJECTION INTRAMUSCULAR; INTRAVENOUS ONCE AS NEEDED
Status: DISCONTINUED | OUTPATIENT
Start: 2025-02-11 | End: 2025-02-11 | Stop reason: HOSPADM

## 2025-02-11 RX ORDER — EPINEPHRINE 0.3 MG/.3ML
0.3 INJECTION SUBCUTANEOUS ONCE AS NEEDED
Status: DISCONTINUED | OUTPATIENT
Start: 2025-02-11 | End: 2025-02-11 | Stop reason: HOSPADM

## 2025-02-11 RX ORDER — HEPARIN 100 UNIT/ML
500 SYRINGE INTRAVENOUS
Status: DISCONTINUED | OUTPATIENT
Start: 2025-02-11 | End: 2025-02-11 | Stop reason: HOSPADM

## 2025-02-11 RX ORDER — PROCHLORPERAZINE EDISYLATE 5 MG/ML
5 INJECTION INTRAMUSCULAR; INTRAVENOUS ONCE AS NEEDED
Status: DISCONTINUED | OUTPATIENT
Start: 2025-02-11 | End: 2025-02-11 | Stop reason: HOSPADM

## 2025-02-11 RX ADMIN — SODIUM CHLORIDE 400 MG: 9 INJECTION, SOLUTION INTRAVENOUS at 02:02

## 2025-02-11 RX ADMIN — HEPARIN 500 UNITS: 100 SYRINGE at 03:02

## 2025-02-11 RX ADMIN — SODIUM CHLORIDE: 9 INJECTION, SOLUTION INTRAVENOUS at 02:02

## 2025-02-11 NOTE — PLAN OF CARE
Pt to clinic via wlc with son for Keytruda infusion. Denies any new or worsening symptoms. Port accessed without difficulty. Good blood return. Tolerated infusion well. Port deaccessed after flushing. From clinic in [facility].

## 2025-02-14 DIAGNOSIS — C16.3 MALIGNANT NEOPLASM OF PYLORIC ANTRUM: Primary | ICD-10-CM

## 2025-02-14 DIAGNOSIS — R53.83 FATIGUE, UNSPECIFIED TYPE: ICD-10-CM

## 2025-03-21 ENCOUNTER — TELEPHONE (OUTPATIENT)
Dept: HEMATOLOGY/ONCOLOGY | Facility: CLINIC | Age: OVER 89
End: 2025-03-21
Payer: COMMERCIAL

## 2025-03-24 ENCOUNTER — INFUSION (OUTPATIENT)
Dept: INFUSION THERAPY | Facility: HOSPITAL | Age: OVER 89
End: 2025-03-24
Payer: COMMERCIAL

## 2025-03-24 ENCOUNTER — OFFICE VISIT (OUTPATIENT)
Dept: HEMATOLOGY/ONCOLOGY | Facility: CLINIC | Age: OVER 89
End: 2025-03-24
Payer: COMMERCIAL

## 2025-03-24 ENCOUNTER — LAB VISIT (OUTPATIENT)
Dept: LAB | Facility: HOSPITAL | Age: OVER 89
End: 2025-03-24
Payer: COMMERCIAL

## 2025-03-24 VITALS
RESPIRATION RATE: 18 BRPM | BODY MASS INDEX: 31.01 KG/M2 | HEIGHT: 67 IN | HEART RATE: 60 BPM | DIASTOLIC BLOOD PRESSURE: 66 MMHG | TEMPERATURE: 99 F | SYSTOLIC BLOOD PRESSURE: 132 MMHG | WEIGHT: 197.56 LBS | OXYGEN SATURATION: 100 %

## 2025-03-24 VITALS
HEIGHT: 67 IN | WEIGHT: 197.56 LBS | OXYGEN SATURATION: 100 % | BODY MASS INDEX: 31.01 KG/M2 | DIASTOLIC BLOOD PRESSURE: 75 MMHG | HEART RATE: 53 BPM | SYSTOLIC BLOOD PRESSURE: 195 MMHG

## 2025-03-24 DIAGNOSIS — M25.541 ARTHRALGIA OF BOTH HANDS: ICD-10-CM

## 2025-03-24 DIAGNOSIS — R53.83 FATIGUE, UNSPECIFIED TYPE: ICD-10-CM

## 2025-03-24 DIAGNOSIS — R53.0 NEOPLASTIC (MALIGNANT) RELATED FATIGUE: ICD-10-CM

## 2025-03-24 DIAGNOSIS — C16.3 MALIGNANT NEOPLASM OF PYLORIC ANTRUM: Primary | ICD-10-CM

## 2025-03-24 DIAGNOSIS — G89.3 NEOPLASM RELATED PAIN: ICD-10-CM

## 2025-03-24 DIAGNOSIS — C16.3 MALIGNANT NEOPLASM OF PYLORIC ANTRUM: ICD-10-CM

## 2025-03-24 DIAGNOSIS — I63.9 CEREBROVASCULAR ACCIDENT (CVA), UNSPECIFIED MECHANISM: ICD-10-CM

## 2025-03-24 DIAGNOSIS — D50.0 IRON DEFICIENCY ANEMIA DUE TO CHRONIC BLOOD LOSS: ICD-10-CM

## 2025-03-24 DIAGNOSIS — M25.542 ARTHRALGIA OF BOTH HANDS: ICD-10-CM

## 2025-03-24 DIAGNOSIS — D63.0 ANEMIA IN NEOPLASTIC DISEASE: ICD-10-CM

## 2025-03-24 LAB
ABSOLUTE NEUTROPHIL COUNT (OHS): 2.58 K/UL (ref 1.8–7.7)
ALBUMIN SERPL BCP-MCNC: 3.7 G/DL (ref 3.5–5.2)
ALP SERPL-CCNC: 62 UNIT/L (ref 40–150)
ALT SERPL W/O P-5'-P-CCNC: 44 UNIT/L (ref 10–44)
ANION GAP (OHS): 11 MMOL/L (ref 8–16)
AST SERPL-CCNC: 47 UNIT/L (ref 11–45)
BILIRUB SERPL-MCNC: 0.3 MG/DL (ref 0.1–1)
BUN SERPL-MCNC: 31 MG/DL (ref 10–30)
CALCIUM SERPL-MCNC: 9.2 MG/DL (ref 8.7–10.5)
CHLORIDE SERPL-SCNC: 101 MMOL/L (ref 95–110)
CO2 SERPL-SCNC: 27 MMOL/L (ref 23–29)
CREAT SERPL-MCNC: 1.4 MG/DL (ref 0.5–1.4)
ERYTHROCYTE [DISTWIDTH] IN BLOOD BY AUTOMATED COUNT: 14.8 % (ref 11.5–14.5)
GFR SERPLBLD CREATININE-BSD FMLA CKD-EPI: 47 ML/MIN/1.73/M2
GLUCOSE SERPL-MCNC: 91 MG/DL (ref 70–110)
HCT VFR BLD AUTO: 35.5 % (ref 40–54)
HGB BLD-MCNC: 11.7 GM/DL (ref 14–18)
IMM GRANULOCYTES # BLD AUTO: 0.03 K/UL (ref 0–0.04)
MCH RBC QN AUTO: 29.8 PG (ref 27–50)
MCHC RBC AUTO-ENTMCNC: 33 G/DL (ref 32–36)
MCV RBC AUTO: 91 FL (ref 82–98)
PLATELET # BLD AUTO: 172 K/UL (ref 150–450)
PMV BLD AUTO: 10.5 FL (ref 9.2–12.9)
POTASSIUM SERPL-SCNC: 4.1 MMOL/L (ref 3.5–5.1)
PROT SERPL-MCNC: 7 GM/DL (ref 6–8.4)
RBC # BLD AUTO: 3.92 M/UL (ref 4.6–6.2)
SODIUM SERPL-SCNC: 139 MMOL/L (ref 136–145)
TSH SERPL-ACNC: 2.21 UIU/ML (ref 0.4–4)
WBC # BLD AUTO: 5.32 K/UL (ref 3.9–12.7)

## 2025-03-24 PROCEDURE — 25000003 PHARM REV CODE 250: Performed by: REGISTERED NURSE

## 2025-03-24 PROCEDURE — 63600175 PHARM REV CODE 636 W HCPCS: Mod: JZ,TB | Performed by: REGISTERED NURSE

## 2025-03-24 PROCEDURE — 85027 COMPLETE CBC AUTOMATED: CPT

## 2025-03-24 PROCEDURE — 84443 ASSAY THYROID STIM HORMONE: CPT

## 2025-03-24 PROCEDURE — 36415 COLL VENOUS BLD VENIPUNCTURE: CPT

## 2025-03-24 PROCEDURE — A4216 STERILE WATER/SALINE, 10 ML: HCPCS | Performed by: REGISTERED NURSE

## 2025-03-24 PROCEDURE — 96413 CHEMO IV INFUSION 1 HR: CPT

## 2025-03-24 PROCEDURE — 82040 ASSAY OF SERUM ALBUMIN: CPT

## 2025-03-24 PROCEDURE — 99999 PR PBB SHADOW E&M-EST. PATIENT-LVL IV: CPT | Mod: PBBFAC,,, | Performed by: REGISTERED NURSE

## 2025-03-24 RX ORDER — SODIUM CHLORIDE 0.9 % (FLUSH) 0.9 %
10 SYRINGE (ML) INJECTION
Status: CANCELLED | OUTPATIENT
Start: 2025-03-24

## 2025-03-24 RX ORDER — DIPHENHYDRAMINE HYDROCHLORIDE 50 MG/ML
50 INJECTION, SOLUTION INTRAMUSCULAR; INTRAVENOUS ONCE AS NEEDED
Status: DISCONTINUED | OUTPATIENT
Start: 2025-03-24 | End: 2025-03-24 | Stop reason: HOSPADM

## 2025-03-24 RX ORDER — DIPHENHYDRAMINE HYDROCHLORIDE 50 MG/ML
50 INJECTION, SOLUTION INTRAMUSCULAR; INTRAVENOUS ONCE AS NEEDED
Status: CANCELLED | OUTPATIENT
Start: 2025-03-24

## 2025-03-24 RX ORDER — PROCHLORPERAZINE EDISYLATE 5 MG/ML
5 INJECTION INTRAMUSCULAR; INTRAVENOUS ONCE AS NEEDED
Status: DISCONTINUED | OUTPATIENT
Start: 2025-03-24 | End: 2025-03-24 | Stop reason: HOSPADM

## 2025-03-24 RX ORDER — HEPARIN 100 UNIT/ML
500 SYRINGE INTRAVENOUS
Status: CANCELLED | OUTPATIENT
Start: 2025-03-24

## 2025-03-24 RX ORDER — SODIUM CHLORIDE 0.9 % (FLUSH) 0.9 %
10 SYRINGE (ML) INJECTION
Status: DISCONTINUED | OUTPATIENT
Start: 2025-03-24 | End: 2025-03-24 | Stop reason: HOSPADM

## 2025-03-24 RX ORDER — HYDROCODONE BITARTRATE AND ACETAMINOPHEN 5; 325 MG/1; MG/1
1 TABLET ORAL EVERY 6 HOURS PRN
Qty: 120 TABLET | Refills: 0 | Status: SHIPPED | OUTPATIENT
Start: 2025-03-24

## 2025-03-24 RX ORDER — HEPARIN 100 UNIT/ML
500 SYRINGE INTRAVENOUS
Status: DISCONTINUED | OUTPATIENT
Start: 2025-03-24 | End: 2025-03-24 | Stop reason: HOSPADM

## 2025-03-24 RX ORDER — EPINEPHRINE 0.3 MG/.3ML
0.3 INJECTION SUBCUTANEOUS ONCE AS NEEDED
Status: CANCELLED | OUTPATIENT
Start: 2025-03-24

## 2025-03-24 RX ORDER — EPINEPHRINE 0.3 MG/.3ML
0.3 INJECTION SUBCUTANEOUS ONCE AS NEEDED
Status: DISCONTINUED | OUTPATIENT
Start: 2025-03-24 | End: 2025-03-24 | Stop reason: HOSPADM

## 2025-03-24 RX ORDER — IBUPROFEN 800 MG/1
800 TABLET ORAL EVERY 8 HOURS PRN
Qty: 90 TABLET | Refills: 2 | Status: SHIPPED | OUTPATIENT
Start: 2025-03-24 | End: 2026-03-24

## 2025-03-24 RX ORDER — PROCHLORPERAZINE EDISYLATE 5 MG/ML
5 INJECTION INTRAMUSCULAR; INTRAVENOUS ONCE AS NEEDED
Status: CANCELLED
Start: 2025-03-24

## 2025-03-24 RX ADMIN — SODIUM CHLORIDE, PRESERVATIVE FREE 10 ML: 5 INJECTION INTRAVENOUS at 02:03

## 2025-03-24 RX ADMIN — HEPARIN SODIUM (PORCINE) LOCK FLUSH IV SOLN 100 UNIT/ML 500 UNITS: 100 SOLUTION at 02:03

## 2025-03-24 RX ADMIN — SODIUM CHLORIDE 400 MG: 9 INJECTION, SOLUTION INTRAVENOUS at 01:03

## 2025-03-24 NOTE — PROGRESS NOTES
MEDICAL ONCOLOGY - ESTABLISHED PATIENT VISIT    Reason for visit: Gastric Adenocarcinoma     Best Contact Phone Number(s): [phone] (home)      Cancer/Stage/TNM:    Cancer Staging   Malignant neoplasm of pyloric antrum  Staging form: Stomach, AJCC 8th Edition  - Clinical stage from 10/20/2023: Stage III (cT3, cN1, cM0) - Signed by Parish Steinberg MD on 11/3/2023       Oncology History   Malignant neoplasm of pyloric antrum   10/20/2023 Cancer Staged    Staging form: Stomach, AJCC 8th Edition  - Clinical stage from 10/20/2023: Stage III (cT3, cN1, cM0)     11/2/2023 Initial Diagnosis    Malignant neoplasm of pyloric antrum     11/21/2023 -  Chemotherapy    Treatment Summary   Plan Name: OP PEMBROLIZUMAB 400MG Q6W  Treatment Goal: Palliative  Status: Active  Start Date: 11/21/2023  End Date: 9/18/2025 (Planned)  Provider: Parish Steinberg MD  Chemotherapy: [No matching medication found in this treatment plan]        History:   89 y.o. male presents for evaluation of newly diagnosed gastric adenocarcinoma. He presented to the ED in September 2023 with weakness and was found to have a Hg of 4.2. He underwent and EGD which revealed an ulcerative mass, biopsies revealed high-grade dysplasia. Had an EUS on 10/20/23 which revealed an ulcerating prepyloric mass (T3, possible N1, M0), biopsy positive for invasic adenocarcinoma. HER2 negative but dMMR (loss of MLH1 and PMS2). NGS and pharmacogenomics were sent. CT CAP on 10/31/23 revealed a circumferential pre-pyloric/pyloric thickening, there are two areas of either extension of the primary mass or enarlged lymph nodes.     Interval History:   Mr Chin returns to the clinic prior to cycle 12 of pembrolizumab for his MSI-H gastric cancer. Doing well overall and tolerating treatment without significant concerns. Continues with chronic L shoulder and back pain, generally stable from prior. No diarrhea or dyspnea. BP elevated but did not take BP meds this AM.  Just took in clinic.     Patient presents with his daughter and son. ECOG PS is 1.     ROS:   Review of Systems   Constitutional:  Negative for chills and fever.   HENT:  Negative for congestion and sore throat.    Eyes:  Negative for pain.   Respiratory:  Negative for cough and shortness of breath.    Cardiovascular:  Negative for chest pain, palpitations and leg swelling.   Gastrointestinal:  Negative for abdominal pain, diarrhea, heartburn, nausea and vomiting.   Genitourinary:  Negative for dysuria.   Musculoskeletal:  Positive for joint pain (L shoulder) and myalgias. Negative for back pain.   Neurological:  Positive for weakness (left side). Negative for dizziness and headaches.   Psychiatric/Behavioral:  Negative for suicidal ideas. The patient is not nervous/anxious.        Past Medical History:   Past Medical History:   Diagnosis Date    Arthritis     In back, neck    Bell's palsy feb or march 2013    Blood clot in vein     right leg after back surgery    Cataract associated with other syndromes     GERD (gastroesophageal reflux disease)     Hypertension     Other and unspecified hyperlipidemia     Stroke     TIA (transient ischemic attack)     Feb or march 2013        Past Surgical History:   Past Surgical History:   Procedure Laterality Date    APPENDECTOMY      BACK SURGERY      COLONOSCOPY N/A 9/29/2023    Procedure: COLONOSCOPY;  Surgeon: Jan Lozano MD;  Location: [facility];  Service: Gastroenterology;  Laterality: N/A;    ENDOSCOPIC ULTRASOUND OF UPPER GASTROINTESTINAL TRACT N/A 10/20/2023    Procedure: ULTRASOUND, UPPER GI TRACT, ENDOSCOPIC;  Surgeon: Reymundo Rangel MD;  Location: [facility];  Service: Endoscopy;  Laterality: N/A;    ESOPHAGOGASTRODUODENOSCOPY N/A 9/29/2023    Procedure: EGD (ESOPHAGOGASTRODUODENOSCOPY);  Surgeon: Jan Lozano MD;  Location: [facility];  Service: Gastroenterology;  Laterality: N/A;    ESOPHAGOGASTRODUODENOSCOPY N/A 10/20/2023     Procedure: EGD (ESOPHAGOGASTRODUODENOSCOPY);  Surgeon: Reymundo Rangel MD;  Location: [facility] ENDO;  Service: Endoscopy;  Laterality: N/A;  urgent EGD/EUS (radial) for gastric ulcerated mass (HGD)   Referring: Jan Lozano MD  10/17/23-Pt is no longer on Plavix, H/O CVA with left hemiparesis, instructions via portal-DS    PROSTATE SURGERY          Family History:   Family History   Problem Relation Name Age of Onset    Stroke Father      Hypertension Son          Social History:   Social History     Tobacco Use    Smoking status: Former     Current packs/day: 0.00     Types: Cigarettes     Quit date: 1955     Years since quittin.2    Smokeless tobacco: Not on file   Substance Use Topics    Alcohol use: No        I have reviewed and updated the patient's past medical, surgical, family and social histories.    Allergies:   Review of patient's allergies indicates:   Allergen Reactions    Prednisone         Medications:   Current Outpatient Medications   Medication Sig Dispense Refill    bumetanide (BUMEX) 1 MG tablet Take 1 mg by mouth once daily.      docusate sodium (COLACE) 100 MG capsule Take 1 capsule (100 mg total) by mouth 2 (two) times daily as needed (while taking pain medication). 20 capsule 0    hydroCHLOROthiazide (HYDRODIURIL) 25 MG tablet Take 25 mg by mouth once daily.      HYDROcodone-acetaminophen (NORCO) 5-325 mg per tablet Take 1 tablet by mouth every 6 (six) hours as needed for Pain. 120 tablet 0    ibuprofen (ADVIL,MOTRIN) 800 MG tablet Take 1 tablet (800 mg total) by mouth every 8 (eight) hours as needed for Pain. 90 tablet 2    irbesartan (AVAPRO) 300 MG tablet Take 300 mg by mouth once daily.      LIDOcaine-prilocaine (EMLA) cream Place to port site 45-60 minutes prior to chemotherapy. 30 g 4    multivitamin-minerals-lutein (MULTIVITAMIN 50 PLUS) Tab Take 1 tablet by mouth once daily.      nebivolol (BYSTOLIC) 10 MG Tab Take 1 tablet (10 mg total) by mouth once daily. 30  "tablet 11    oxybutynin (DITROPAN-XL) 10 MG 24 hr tablet Take 10 mg by mouth once daily.      potassium chloride SA (KLOR-CON M20) 20 MEQ tablet Take 1 tablet (20 mEq total) by mouth once daily. 30 tablet 11    saw palmetto 500 MG capsule Take 500 mg by mouth once daily.      zinc gluconate 50 mg tablet Take 50 mg by mouth once daily.      amoxicillin (AMOXIL) 500 MG capsule Take 500 mg by mouth 2 (two) times daily. (Patient not taking: Reported on 5/16/2024)      atorvastatin (LIPITOR) 20 MG tablet Take 1 tablet (20 mg total) by mouth once daily. 30 tablet 1    omeprazole (PRILOSEC OTC) 20 MG tablet Take 2 tablets (40 mg total) by mouth once daily. 122 tablet 0    promethazine-dextromethorphan (PROMETHAZINE-DM) 6.25-15 mg/5 mL Syrp Take 5 mLs by mouth every 6 (six) hours as needed. (Patient not taking: Reported on 5/16/2024)       No current facility-administered medications for this visit.        Physical Exam:   BP (!) 195/75 (BP Location: Right forearm, Patient Position: Sitting) Comment: pt didnt take bp meds today  Pulse (!) 53   Ht 5' 7" (1.702 m)   Wt 89.6 kg (197 lb 8.5 oz)   SpO2 100%   BMI 30.94 kg/m²            Physical Exam  Constitutional:       Appearance: Normal appearance.   HENT:      Head: Normocephalic and atraumatic.      Mouth/Throat:      Mouth: Mucous membranes are moist.      Pharynx: Oropharynx is clear.   Eyes:      Extraocular Movements: Extraocular movements intact.      Pupils: Pupils are equal, round, and reactive to light.   Cardiovascular:      Rate and Rhythm: Normal rate and regular rhythm.      Pulses: Normal pulses.      Heart sounds: Normal heart sounds.   Pulmonary:      Effort: Pulmonary effort is normal.      Breath sounds: Normal breath sounds.   Abdominal:      General: Abdomen is flat.      Palpations: Abdomen is soft.      Tenderness: There is no abdominal tenderness.   Musculoskeletal:         General: Normal range of motion.      Cervical back: Normal range of " motion and neck supple.      Right lower leg: No edema.      Left lower leg: No edema.   Skin:     General: Skin is warm and dry.   Neurological:      General: No focal deficit present.      Mental Status: He is alert and oriented to person, place, and time.      Motor: Weakness (left side slightly weaker than right) present.   Psychiatric:         Mood and Affect: Mood normal.         Behavior: Behavior normal.         Labs:   Recent Results (from the past 48 hours)   CBC Oncology    Collection Time: 25  9:37 AM   Result Value Ref Range    WBC 5.32 3.90 - 12.70 K/uL    RBC 3.92 (L) 4.60 - 6.20 M/uL    HGB 11.7 (L) 14.0 - 18.0 gm/dL    HCT 35.5 (L) 40.0 - 54.0 %    MCV 91 82 - 98 fL    MCH 29.8 27.0 - 50.0 pg    MCHC 33.0 32.0 - 36.0 g/dL    RDW 14.8 (H) 11.5 - 14.5 %    Platelet Count 172 150 - 450 K/uL    MPV 10.5 9.2 - 12.9 fL    Neut # 2.58 1.8 - 7.7 K/uL    Imm Grans # 0.03 0.00 - 0.04 K/uL   Comprehensive Metabolic Panel    Collection Time: 25  9:37 AM   Result Value Ref Range    Sodium 139 136 - 145 mmol/L    Potassium 4.1 3.5 - 5.1 mmol/L    Chloride 101 95 - 110 mmol/L    CO2 27 23 - 29 mmol/L    Glucose 91 70 - 110 mg/dL    BUN 31 (H) 10 - 30 mg/dL    Creatinine 1.4 0.5 - 1.4 mg/dL    Calcium 9.2 8.7 - 10.5 mg/dL    Protein Total 7.0 6.0 - 8.4 gm/dL    Albumin 3.7 3.5 - 5.2 g/dL    Bilirubin Total 0.3 0.1 - 1.0 mg/dL    ALP 62 40 - 150 unit/L    AST 47 (H) 11 - 45 unit/L    ALT 44 10 - 44 unit/L    Anion Gap 11 8 - 16 mmol/L    eGFR 47 (L) >60 mL/min/1.73/m2       Imagin/24/24 - CT CAP:    Impression:     Stable focal wall thickening in the gastric antrum.  Stable subcentimeter perigastric lymph node.     No evidence of distant metastatic disease.    Path:   10/20/23 - Gastric biopsy with invasive adenocarcinoma. Loss of nuclear expression of MLH1 and PMS2.       Assessment:       1. Malignant neoplasm of pyloric antrum    2. Fatigue, unspecified type    3. Iron deficiency anemia due  to chronic blood loss    4. Neoplastic (malignant) related fatigue    5. Anemia in neoplastic disease    6. Cerebrovascular accident (CVA), unspecified mechanism    7. Arthralgia of both hands    8. Neoplasm related pain       Plan:        # Malignant Neoplasm of Pyloric Antrum   Patient diagnosed with gastric adenocarcinoma on 10/20/23. No signs of metastasis on imaging. Patient is not a candidate for surgery or chemotherapy given his age and co-morbidities. His tumor was MSI-H (loss of nuclear expression of MLH1 and PMS2) so he is a candidate for immunotherapy. Spoke with patient and his family about the risks and benefits of immunotherapy and they are in agreement with starting it.     Began cycle 1 of q6 week Keytruda on 11/21/23.  Tolerating well. Eating well and has a good appetite  Anemia improving.  CT CAP in April showed improvement in the thickening of the gastric antrum and adjacent lymph nodes. Good response, recommend to c/w with Keytruda.  CT CAP in September 2024 showed stable disease.   CT CAP in December 2024 showed stable disease.    Presents prior to cycle 12.  Lab work reviewed and adequate for treatment   Proceed with cycle of Keytruda 400mg.    - RTC in 6 weeks for cycle 13 with repeat imaging.     # JOSE JUAN, anemia in neoplastic disease, neoplastic related fatigue  - Secondary to blood loss from gastric mass. Improved.   - Received Injectafer.  - will monitor TSH while receiving Pembro  - BP elevated; asymptomatic.  Did not take his BP medication this morning.    # CVA, arthralgias  - some residual deficits but remains ambulatory with assistive devices at home.  - Arthralgias unlikely to be immune-mediated; Will continue NSAIDs and PRN Norco.    Follow up: 6 weeks for cycle 13.     Patient is in agreement with the proposed treatment plan. All questions were answered to the patient's satisfaction. Pt knows to call clinic if anything is needed before the next clinic visit.    Patient discussed with  collaborating physician, Dr. Steinberg.    At least 40 minutes were spent today on this encounter including face to face time with the patient, data gathering/interpretation and documentation.       Janine Koehler, MSN, APRN, [facility]  Hematology and Medical Oncology  Clinical Nurse Specialist to Dr. Steinberg, Dr. Medina & Dr. Ash         code applied: patient requires or will require a continuous, longitudinal, and active collaborative plan of care related to this patient's health condition, gastric cancer --the management of which requires the direction of a practitioner with specialized clinical knowledge, skill, and expertise.     Route Chart for Scheduling    Med Onc Chart Routing      Follow up with physician 6 weeks. with labs and scans 1-2 days prior to see Dr. Steinberg for scan results and infusion. please schedule visits and infusion close to each other to avoid long wait times between appointments)   Follow up with [facility]    Infusion scheduling note   keytruda every 6 weeks   Injection scheduling note    Labs CBC, CMP and TSH   Scheduling:  Preferred lab:  Lab interval: every 6 weeks     Imaging CT chest abdomen pelvis      Pharmacy appointment    Other referrals              Treatment Plan Information   OP PEMBROLIZUMAB 400MG Q6W Parish Steinberg MD   Associated diagnosis: Malignant neoplasm of pyloric antrum Stage III cT3, cN1, cM0 noted on 11/2/2023   Line of treatment: First Line  Treatment Goal: Palliative     Upcoming Treatment Dates - OP PEMBROLIZUMAB 400MG Q6W    2/20/2025       Chemotherapy       pembrolizumab (KEYTRUDA) 400 mg in 0.9% NaCl SolP 116 mL infusion  4/3/2025       Chemotherapy       pembrolizumab (KEYTRUDA) 400 mg in 0.9% NaCl SolP 116 mL infusion  5/15/2025       Chemotherapy       pembrolizumab (KEYTRUDA) 400 mg in 0.9% NaCl SolP 116 mL infusion  6/26/2025       Chemotherapy       pembrolizumab (KEYTRUDA) 400 mg in 0.9% NaCl SolP 116 mL infusion    Therapy Plan  Information  INJECTAFER (FERRIC CARBOXYMALTOSE) for 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    No therapy plan of the specified type found.

## 2025-03-24 NOTE — PLAN OF CARE
"Blood pressure (!) 146/66, pulse (!) 53, temperature 98.7 °F (37.1 °C), resp. rate 18, height 5' 7" (1.702 m), weight 89.6 kg (197 lb 8.5 oz), SpO2 100%. Pleasant, alert and oriented patient to Chemo Infusion per self via w/c per wife for C12 Keytruda - VSS, BP checked with Manual BP cuff due to HTN in office, BP down, RCW port accessed with blood return observed, flushed with NS, dressing applied and patient tolerated procedure well - patient tolerated treatment with no AVE's, port flushed with NS/Heparin, blood return observed, port de-accessed, band-aide applied and patient discharged to home with wife with no concerns - RTC on 5/5/25  "

## 2025-05-01 ENCOUNTER — HOSPITAL ENCOUNTER (OUTPATIENT)
Dept: RADIOLOGY | Facility: HOSPITAL | Age: OVER 89
Discharge: HOME | End: 2025-05-01
Attending: PHYSICIAN ASSISTANT
Payer: COMMERCIAL

## 2025-05-01 ENCOUNTER — RESULTS FOLLOW-UP (OUTPATIENT)
Dept: HEMATOLOGY/ONCOLOGY | Facility: CLINIC | Age: OVER 89
End: 2025-05-01

## 2025-05-01 DIAGNOSIS — C16.3 MALIGNANT NEOPLASM OF PYLORIC ANTRUM: ICD-10-CM

## 2025-05-01 PROCEDURE — 25500020 PHARM REV CODE 255: Performed by: PHYSICIAN ASSISTANT

## 2025-05-01 PROCEDURE — A9698 NON-RAD CONTRAST MATERIALNOC: HCPCS | Performed by: PHYSICIAN ASSISTANT

## 2025-05-01 PROCEDURE — 74177 CT ABD & PELVIS W/CONTRAST: CPT | Mod: 26,,, | Performed by: RADIOLOGY

## 2025-05-01 PROCEDURE — 71260 CT THORAX DX C+: CPT | Mod: TC

## 2025-05-01 PROCEDURE — 71260 CT THORAX DX C+: CPT | Mod: 26,,, | Performed by: RADIOLOGY

## 2025-05-01 RX ADMIN — BARIUM SULFATE 450 ML: 20 SUSPENSION ORAL at 09:05

## 2025-05-01 RX ADMIN — IOHEXOL 100 ML: 350 INJECTION, SOLUTION INTRAVENOUS at 09:05

## 2025-05-05 ENCOUNTER — INFUSION (OUTPATIENT)
Dept: INFUSION THERAPY | Facility: HOSPITAL | Age: OVER 89
End: 2025-05-05
Payer: COMMERCIAL

## 2025-05-05 ENCOUNTER — OFFICE VISIT (OUTPATIENT)
Dept: HEMATOLOGY/ONCOLOGY | Facility: CLINIC | Age: OVER 89
End: 2025-05-05
Payer: COMMERCIAL

## 2025-05-05 VITALS
RESPIRATION RATE: 18 BRPM | SYSTOLIC BLOOD PRESSURE: 154 MMHG | OXYGEN SATURATION: 97 % | WEIGHT: 199.06 LBS | DIASTOLIC BLOOD PRESSURE: 54 MMHG | HEART RATE: 59 BPM | TEMPERATURE: 98 F | BODY MASS INDEX: 31.24 KG/M2 | HEIGHT: 67 IN

## 2025-05-05 DIAGNOSIS — C16.3 MALIGNANT NEOPLASM OF PYLORIC ANTRUM: Primary | ICD-10-CM

## 2025-05-05 DIAGNOSIS — D50.0 IRON DEFICIENCY ANEMIA DUE TO CHRONIC BLOOD LOSS: ICD-10-CM

## 2025-05-05 DIAGNOSIS — I63.9 CEREBROVASCULAR ACCIDENT (CVA), UNSPECIFIED MECHANISM: ICD-10-CM

## 2025-05-05 DIAGNOSIS — D63.0 ANEMIA IN NEOPLASTIC DISEASE: ICD-10-CM

## 2025-05-05 DIAGNOSIS — M25.542 ARTHRALGIA OF BOTH HANDS: ICD-10-CM

## 2025-05-05 DIAGNOSIS — G89.3 NEOPLASM RELATED PAIN: ICD-10-CM

## 2025-05-05 DIAGNOSIS — R53.0 NEOPLASTIC (MALIGNANT) RELATED FATIGUE: ICD-10-CM

## 2025-05-05 DIAGNOSIS — M25.541 ARTHRALGIA OF BOTH HANDS: ICD-10-CM

## 2025-05-05 PROCEDURE — 99999 PR PBB SHADOW E&M-EST. PATIENT-LVL III: CPT | Mod: PBBFAC,,, | Performed by: INTERNAL MEDICINE

## 2025-05-05 PROCEDURE — 25000003 PHARM REV CODE 250: Performed by: INTERNAL MEDICINE

## 2025-05-05 PROCEDURE — 63600175 PHARM REV CODE 636 W HCPCS: Performed by: INTERNAL MEDICINE

## 2025-05-05 PROCEDURE — A4216 STERILE WATER/SALINE, 10 ML: HCPCS | Performed by: INTERNAL MEDICINE

## 2025-05-05 PROCEDURE — 96413 CHEMO IV INFUSION 1 HR: CPT

## 2025-05-05 RX ORDER — SODIUM CHLORIDE 0.9 % (FLUSH) 0.9 %
10 SYRINGE (ML) INJECTION
Status: DISCONTINUED | OUTPATIENT
Start: 2025-05-05 | End: 2025-05-05 | Stop reason: HOSPADM

## 2025-05-05 RX ORDER — HYDROCODONE BITARTRATE AND ACETAMINOPHEN 5; 325 MG/1; MG/1
1 TABLET ORAL EVERY 6 HOURS PRN
Qty: 120 TABLET | Refills: 0 | Status: SHIPPED | OUTPATIENT
Start: 2025-05-05

## 2025-05-05 RX ORDER — SODIUM CHLORIDE 0.9 % (FLUSH) 0.9 %
10 SYRINGE (ML) INJECTION
Status: CANCELLED | OUTPATIENT
Start: 2025-05-05

## 2025-05-05 RX ORDER — IBUPROFEN 800 MG/1
800 TABLET ORAL EVERY 8 HOURS PRN
Qty: 90 TABLET | Refills: 2 | Status: SHIPPED | OUTPATIENT
Start: 2025-05-05 | End: 2026-05-05

## 2025-05-05 RX ORDER — EPINEPHRINE 0.3 MG/.3ML
0.3 INJECTION SUBCUTANEOUS ONCE AS NEEDED
Status: DISCONTINUED | OUTPATIENT
Start: 2025-05-05 | End: 2025-05-05 | Stop reason: HOSPADM

## 2025-05-05 RX ORDER — DIPHENHYDRAMINE HYDROCHLORIDE 50 MG/ML
50 INJECTION, SOLUTION INTRAMUSCULAR; INTRAVENOUS ONCE AS NEEDED
Status: DISCONTINUED | OUTPATIENT
Start: 2025-05-05 | End: 2025-05-05 | Stop reason: HOSPADM

## 2025-05-05 RX ORDER — PROCHLORPERAZINE EDISYLATE 5 MG/ML
5 INJECTION INTRAMUSCULAR; INTRAVENOUS ONCE AS NEEDED
Status: CANCELLED
Start: 2025-05-05

## 2025-05-05 RX ORDER — EPINEPHRINE 0.3 MG/.3ML
0.3 INJECTION SUBCUTANEOUS ONCE AS NEEDED
Status: CANCELLED | OUTPATIENT
Start: 2025-05-05

## 2025-05-05 RX ORDER — HEPARIN 100 UNIT/ML
500 SYRINGE INTRAVENOUS
Status: DISCONTINUED | OUTPATIENT
Start: 2025-05-05 | End: 2025-05-05 | Stop reason: HOSPADM

## 2025-05-05 RX ORDER — DIPHENHYDRAMINE HYDROCHLORIDE 50 MG/ML
50 INJECTION, SOLUTION INTRAMUSCULAR; INTRAVENOUS ONCE AS NEEDED
Status: CANCELLED | OUTPATIENT
Start: 2025-05-05

## 2025-05-05 RX ORDER — PROCHLORPERAZINE EDISYLATE 5 MG/ML
5 INJECTION INTRAMUSCULAR; INTRAVENOUS ONCE AS NEEDED
Status: DISCONTINUED | OUTPATIENT
Start: 2025-05-05 | End: 2025-05-05 | Stop reason: HOSPADM

## 2025-05-05 RX ORDER — HEPARIN 100 UNIT/ML
500 SYRINGE INTRAVENOUS
Status: CANCELLED | OUTPATIENT
Start: 2025-05-05

## 2025-05-05 RX ADMIN — SODIUM CHLORIDE: 9 INJECTION, SOLUTION INTRAVENOUS at 11:05

## 2025-05-05 RX ADMIN — Medication 10 ML: at 12:05

## 2025-05-05 RX ADMIN — SODIUM CHLORIDE 400 MG: 9 INJECTION, SOLUTION INTRAVENOUS at 12:05

## 2025-05-05 RX ADMIN — HEPARIN SODIUM (PORCINE) LOCK FLUSH IV SOLN 100 UNIT/ML 500 UNITS: 100 SOLUTION at 12:05

## 2025-05-05 NOTE — PROGRESS NOTES
MEDICAL ONCOLOGY - ESTABLISHED PATIENT VISIT    Reason for visit: Gastric Adenocarcinoma     Best Contact Phone Number(s): [phone] (home)      Cancer/Stage/TNM:    Cancer Staging   Malignant neoplasm of pyloric antrum  Staging form: Stomach, AJCC 8th Edition  - Clinical stage from 10/20/2023: Stage III (cT3, cN1, cM0) - Signed by Parish Steinberg MD on 11/3/2023       Oncology History   Malignant neoplasm of pyloric antrum   10/20/2023 Cancer Staged    Staging form: Stomach, AJCC 8th Edition  - Clinical stage from 10/20/2023: Stage III (cT3, cN1, cM0)     11/2/2023 Initial Diagnosis    Malignant neoplasm of pyloric antrum     11/21/2023 -  Chemotherapy    Treatment Summary   Plan Name: OP PEMBROLIZUMAB 400MG Q6W  Treatment Goal: Palliative  Status: Active  Start Date: 11/21/2023  End Date: 9/18/2025 (Planned)  Provider: Parish Steinberg MD  Chemotherapy: [No matching medication found in this treatment plan]        History:   89 y.o. male presents for evaluation of newly diagnosed gastric adenocarcinoma. He presented to the ED in September 2023 with weakness and was found to have a Hg of 4.2. He underwent and EGD which revealed an ulcerative mass, biopsies revealed high-grade dysplasia. Had an EUS on 10/20/23 which revealed an ulcerating prepyloric mass (T3, possible N1, M0), biopsy positive for invasive adenocarcinoma. HER2 negative but dMMR (loss of MLH1 and PMS2). NGS and pharmacogenomics were sent. CT CAP on 10/31/23 revealed a circumferential pre-pyloric/pyloric thickening, there are two areas of either extension of the primary mass or enarlged lymph nodes.     Interval History:   Mr Chin returns to the clinic prior to cycle 13 of pembrolizumab for his MSI-H gastric cancer.  He had an episode of nausea and vomiting this past Saturday.  Has some mild nausea this morning.  This is not typical for him.  Otherwise, no new pain.  Chronic L shoulder pain.  Normal bowel movements.     Patient presents  with his daughter and son. ECOG PS is 1.     ROS:   Review of Systems   Constitutional:  Negative for chills and fever.   HENT:  Negative for congestion and sore throat.    Eyes:  Negative for pain.   Respiratory:  Negative for cough and shortness of breath.    Cardiovascular:  Negative for chest pain, palpitations and leg swelling.   Gastrointestinal:  Negative for abdominal pain, diarrhea, heartburn, nausea and vomiting.   Genitourinary:  Negative for dysuria.   Musculoskeletal:  Positive for joint pain (L shoulder) and myalgias. Negative for back pain.   Neurological:  Positive for weakness (left side). Negative for dizziness and headaches.   Psychiatric/Behavioral:  Negative for suicidal ideas. The patient is not nervous/anxious.        Past Medical History:   Past Medical History:   Diagnosis Date    Arthritis     In back, neck    Bell's palsy feb or march 2013    Blood clot in vein     right leg after back surgery    Cataract associated with other syndromes     GERD (gastroesophageal reflux disease)     Hypertension     Other and unspecified hyperlipidemia     Stroke     TIA (transient ischemic attack)     Feb or march 2013        Past Surgical History:   Past Surgical History:   Procedure Laterality Date    APPENDECTOMY      BACK SURGERY      COLONOSCOPY N/A 9/29/2023    Procedure: COLONOSCOPY;  Surgeon: Jan Lozano MD;  Location: [facility];  Service: Gastroenterology;  Laterality: N/A;    ENDOSCOPIC ULTRASOUND OF UPPER GASTROINTESTINAL TRACT N/A 10/20/2023    Procedure: ULTRASOUND, UPPER GI TRACT, ENDOSCOPIC;  Surgeon: Reymundo Rangel MD;  Location: [facility];  Service: Endoscopy;  Laterality: N/A;    ESOPHAGOGASTRODUODENOSCOPY N/A 9/29/2023    Procedure: EGD (ESOPHAGOGASTRODUODENOSCOPY);  Surgeon: Jan Lozano MD;  Location: [facility];  Service: Gastroenterology;  Laterality: N/A;    ESOPHAGOGASTRODUODENOSCOPY N/A 10/20/2023    Procedure: EGD (ESOPHAGOGASTRODUODENOSCOPY);   Surgeon: Reymundo Rangel MD;  Location: [facility];  Service: Endoscopy;  Laterality: N/A;  urgent EGD/EUS (radial) for gastric ulcerated mass (HGD)   Referring: Jan Lozano MD  10/17/23-Pt is no longer on Plavix, H/O CVA with left hemiparesis, instructions via portal-DS    PROSTATE SURGERY          Family History:   Family History   Problem Relation Name Age of Onset    Stroke Father      Hypertension Son          Social History:   Social History     Tobacco Use    Smoking status: Former     Current packs/day: 0.00     Types: Cigarettes     Quit date: 1955     Years since quittin.3    Smokeless tobacco: Not on file   Substance Use Topics    Alcohol use: No        I have reviewed and updated the patient's past medical, surgical, family and social histories.    Allergies:   Review of patient's allergies indicates:   Allergen Reactions    Prednisone         Medications:   Current Outpatient Medications   Medication Sig Dispense Refill    amoxicillin (AMOXIL) 500 MG capsule Take 500 mg by mouth 2 (two) times daily.      atorvastatin (LIPITOR) 20 MG tablet Take 1 tablet (20 mg total) by mouth once daily. 30 tablet 1    bumetanide (BUMEX) 1 MG tablet Take 1 mg by mouth once daily.      docusate sodium (COLACE) 100 MG capsule Take 1 capsule (100 mg total) by mouth 2 (two) times daily as needed (while taking pain medication). 20 capsule 0    hydroCHLOROthiazide (HYDRODIURIL) 25 MG tablet Take 25 mg by mouth once daily.      HYDROcodone-acetaminophen (NORCO) 5-325 mg per tablet Take 1 tablet by mouth every 6 (six) hours as needed for Pain. 120 tablet 0    ibuprofen (ADVIL,MOTRIN) 800 MG tablet Take 1 tablet (800 mg total) by mouth every 8 (eight) hours as needed for Pain. 90 tablet 2    irbesartan (AVAPRO) 300 MG tablet Take 300 mg by mouth once daily.      LIDOcaine-prilocaine (EMLA) cream Place to port site 45-60 minutes prior to chemotherapy. 30 g 4    multivitamin-minerals-lutein (MULTIVITAMIN 50  PLUS) Tab Take 1 tablet by mouth once daily.      nebivolol (BYSTOLIC) 10 MG Tab Take 1 tablet (10 mg total) by mouth once daily. 30 tablet 11    omeprazole (PRILOSEC OTC) 20 MG tablet Take 2 tablets (40 mg total) by mouth once daily. 122 tablet 0    oxybutynin (DITROPAN-XL) 10 MG 24 hr tablet Take 10 mg by mouth once daily.      potassium chloride SA (KLOR-CON M20) 20 MEQ tablet Take 1 tablet (20 mEq total) by mouth once daily. 30 tablet 11    promethazine-dextromethorphan (PROMETHAZINE-DM) 6.25-15 mg/5 mL Syrp Take 5 mLs by mouth every 6 (six) hours as needed.      saw palmetto 500 MG capsule Take 500 mg by mouth once daily.      zinc gluconate 50 mg tablet Take 50 mg by mouth once daily.       No current facility-administered medications for this visit.        Physical Exam:   There were no vitals taken for this visit.           Physical Exam  Constitutional:       Appearance: Normal appearance.   HENT:      Head: Normocephalic and atraumatic.      Mouth/Throat:      Mouth: Mucous membranes are moist.      Pharynx: Oropharynx is clear.   Eyes:      Extraocular Movements: Extraocular movements intact.      Pupils: Pupils are equal, round, and reactive to light.   Cardiovascular:      Rate and Rhythm: Normal rate and regular rhythm.      Pulses: Normal pulses.      Heart sounds: Normal heart sounds.   Pulmonary:      Effort: Pulmonary effort is normal.      Breath sounds: Normal breath sounds.   Abdominal:      General: Abdomen is flat.      Palpations: Abdomen is soft.      Tenderness: There is no abdominal tenderness.   Musculoskeletal:         General: Normal range of motion.      Cervical back: Normal range of motion and neck supple.      Right lower leg: No edema.      Left lower leg: No edema.   Skin:     General: Skin is warm and dry.   Neurological:      General: No focal deficit present.      Mental Status: He is alert and oriented to person, place, and time.      Motor: Weakness (left side slightly weaker  than right) present.   Psychiatric:         Mood and Affect: Mood normal.         Behavior: Behavior normal.         Labs:   No results found for this or any previous visit (from the past 48 hours).      Imaging:      CT CAP - 5/1/25:    Impression:     History of pre pyloric adenomacarcinoma, no evidence of distal metastases.  Mild apparent gastric wall thickening at the pre pyloric region, could represent residual neoplasm or nondistention.  It is unchanged from the prior study.     Benign hepatic cyst.     Mild prostatomegaly.     Focal 50% stenosis right common iliac artery due to a noncalcified mural thrombus.    Path:   10/20/23 - Gastric biopsy with invasive adenocarcinoma. Loss of nuclear expression of MLH1 and PMS2.       Assessment:       1. Malignant neoplasm of pyloric antrum    2. Iron deficiency anemia due to chronic blood loss    3. Anemia in neoplastic disease    4. Neoplastic (malignant) related fatigue    5. Cerebrovascular accident (CVA), unspecified mechanism    6. Arthralgia of both hands    7. Neoplasm related pain         Plan:        # Malignant Neoplasm of Pyloric Antrum   Patient diagnosed with gastric adenocarcinoma on 10/20/23. No signs of metastasis on imaging. Patient is not a candidate for surgery or chemotherapy given his age and co-morbidities. His tumor was MSI-H (loss of nuclear expression of MLH1 and PMS2) so he is a candidate for immunotherapy. Spoke with patient and his family about the risks and benefits of immunotherapy and they are in agreement with starting it.     Began cycle 1 of q6 week Keytruda on 11/21/23.  Tolerating well. Eating well and has a good appetite  Anemia improving.  CT CAP in April showed improvement in the thickening of the gastric antrum and adjacent lymph nodes. Good response, recommend to c/w with Keytruda.  CT CAP in September 2024 showed stable disease.   CT CAP in December 2024 showed stable disease.  CT CAP in May 2025 showed stable  disease.    Presents prior to cycle 13.  Lab work reviewed and adequate for treatment   Proceed with cycle of Keytruda 400mg.    - RTC in 6 weeks for cycle 14.    # JOSE JUAN, anemia in neoplastic disease, neoplastic related fatigue  - Secondary to blood loss from gastric mass. Improved.   - Received Injectafer.  - will monitor TSH while receiving Pembro  - BP controlled today.    # CVA, arthralgias  - some residual deficits but remains ambulatory with assistive devices at home.  - Arthralgias unlikely to be immune-mediated; Will continue NSAIDs and PRN Norco.    # R common iliac artery stenosis  Seen incidentally on 5/1/25 CT.  He is asymptomatic.  Will reach out to vascular surgery to see if any medication change or intervention is indicated. Will let the patient know once I hear back.    Follow up: 6 weeks for cycle 14.     Patient is in agreement with the proposed treatment plan. All questions were answered to the patient's satisfaction. Pt knows to call clinic if anything is needed before the next clinic visit.    Parish Steinberg MD  Hematology/Oncology  [facility]           code applied: patient requires or will require a continuous, longitudinal, and active collaborative plan of care related to this patient's health condition, gastric cancer --the management of which requires the direction of a practitioner with specialized clinical knowledge, skill, and expertise.     Route Chart for Scheduling    Med Onc Chart Routing      Follow up with physician 6 weeks. for keytruda   Follow up with L[facility]ON 3 months. for keytruda   Infusion scheduling note    Injection scheduling note    Labs CBC, CMP and TSH   Scheduling:  Preferred lab:  Lab interval: every 6 weeks     Imaging    Pharmacy appointment    Other referrals              Treatment Plan Information   OP PEMBROLIZUMAB 400MG Q6W Parish Steinberg MD   Associated diagnosis: Malignant neoplasm of pyloric antrum Stage III cT3, cN1, cM0 noted  on 11/2/2023   Line of treatment: First Line  Treatment Goal: Palliative     Upcoming Treatment Dates - OP PEMBROLIZUMAB 400MG Q6W    4/3/2025       Chemotherapy       pembrolizumab (KEYTRUDA) 400 mg in 0.9% NaCl SolP 116 mL infusion  5/15/2025       Chemotherapy       pembrolizumab (KEYTRUDA) 400 mg in 0.9% NaCl SolP 116 mL infusion  6/26/2025       Chemotherapy       pembrolizumab (KEYTRUDA) 400 mg in 0.9% NaCl SolP 116 mL infusion  8/7/2025       Chemotherapy       pembrolizumab (KEYTRUDA) 400 mg in 0.9% NaCl SolP 116 mL infusion    Therapy Plan Information  INJECTAFER (FERRIC CARBOXYMALTOSE) for 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    No therapy plan of the specified type found.

## 2025-05-05 NOTE — Clinical Note
Kentrell Martinez.  This is a patient I am taking care of with gastric cancer, treating with immunotherapy (pembrolizumab).  He was noted on his CT for eval of his cancer status to have 50% stenosis with thrombus in the R common iliac artery.  He is asymptomatic.  He's not on antiplatelet.  I wanted to see if there's something you'd recommend I do at this time or if any further testing or referral might be necessary. I just wanted to make sure I wasn't missing anything in management. Thanks, Kristian

## 2025-05-08 ENCOUNTER — TELEPHONE (OUTPATIENT)
Dept: HEMATOLOGY/ONCOLOGY | Facility: CLINIC | Age: OVER 89
End: 2025-05-08
Payer: COMMERCIAL

## 2025-05-08 NOTE — TELEPHONE ENCOUNTER
Called patient, daughter, and son.  Left VM on son's phone with recommendation that patient take baby aspirin daily for ?arterial thrombus seen on CT imaging; patient asymptomatic.    Parish Steinberg MD  Hematology/Oncology  [facility] [place]

## 2025-06-16 ENCOUNTER — LAB VISIT (OUTPATIENT)
Dept: LAB | Facility: HOSPITAL | Age: OVER 89
End: 2025-06-16
Payer: COMMERCIAL

## 2025-06-16 ENCOUNTER — INFUSION (OUTPATIENT)
Dept: INFUSION THERAPY | Facility: HOSPITAL | Age: OVER 89
End: 2025-06-16
Payer: COMMERCIAL

## 2025-06-16 ENCOUNTER — OFFICE VISIT (OUTPATIENT)
Dept: HEMATOLOGY/ONCOLOGY | Facility: CLINIC | Age: OVER 89
End: 2025-06-16
Payer: COMMERCIAL

## 2025-06-16 VITALS
DIASTOLIC BLOOD PRESSURE: 67 MMHG | SYSTOLIC BLOOD PRESSURE: 135 MMHG | OXYGEN SATURATION: 99 % | RESPIRATION RATE: 18 BRPM | HEART RATE: 55 BPM

## 2025-06-16 VITALS
HEART RATE: 63 BPM | HEIGHT: 67 IN | DIASTOLIC BLOOD PRESSURE: 74 MMHG | SYSTOLIC BLOOD PRESSURE: 170 MMHG | OXYGEN SATURATION: 97 % | RESPIRATION RATE: 18 BRPM | BODY MASS INDEX: 30.83 KG/M2 | WEIGHT: 196.44 LBS | TEMPERATURE: 98 F

## 2025-06-16 DIAGNOSIS — D50.0 IRON DEFICIENCY ANEMIA DUE TO CHRONIC BLOOD LOSS: ICD-10-CM

## 2025-06-16 DIAGNOSIS — R53.83 FATIGUE, UNSPECIFIED TYPE: ICD-10-CM

## 2025-06-16 DIAGNOSIS — C16.3 MALIGNANT NEOPLASM OF PYLORIC ANTRUM: Primary | ICD-10-CM

## 2025-06-16 DIAGNOSIS — D63.0 ANEMIA IN NEOPLASTIC DISEASE: ICD-10-CM

## 2025-06-16 DIAGNOSIS — M25.541 ARTHRALGIA OF BOTH HANDS: ICD-10-CM

## 2025-06-16 DIAGNOSIS — I63.9 CEREBROVASCULAR ACCIDENT (CVA), UNSPECIFIED MECHANISM: ICD-10-CM

## 2025-06-16 DIAGNOSIS — D84.81 IMMUNODEFICIENCY SECONDARY TO NEOPLASM: ICD-10-CM

## 2025-06-16 DIAGNOSIS — R53.0 NEOPLASTIC (MALIGNANT) RELATED FATIGUE: ICD-10-CM

## 2025-06-16 DIAGNOSIS — D49.9 IMMUNODEFICIENCY SECONDARY TO NEOPLASM: ICD-10-CM

## 2025-06-16 DIAGNOSIS — M25.542 ARTHRALGIA OF BOTH HANDS: ICD-10-CM

## 2025-06-16 DIAGNOSIS — C16.3 MALIGNANT NEOPLASM OF PYLORIC ANTRUM: ICD-10-CM

## 2025-06-16 DIAGNOSIS — G89.3 NEOPLASM RELATED PAIN: ICD-10-CM

## 2025-06-16 LAB
ABSOLUTE NEUTROPHIL COUNT (OHS): 3.37 K/UL (ref 1.8–7.7)
ALBUMIN SERPL BCP-MCNC: 3.9 G/DL (ref 3.5–5.2)
ALP SERPL-CCNC: 59 UNIT/L (ref 40–150)
ALT SERPL W/O P-5'-P-CCNC: 32 UNIT/L (ref 10–44)
ANION GAP (OHS): 10 MMOL/L (ref 8–16)
AST SERPL-CCNC: 38 UNIT/L (ref 11–45)
BILIRUB SERPL-MCNC: 0.3 MG/DL (ref 0.1–1)
BUN SERPL-MCNC: 37 MG/DL (ref 10–30)
CALCIUM SERPL-MCNC: 9.3 MG/DL (ref 8.7–10.5)
CHLORIDE SERPL-SCNC: 104 MMOL/L (ref 95–110)
CO2 SERPL-SCNC: 27 MMOL/L (ref 23–29)
CREAT SERPL-MCNC: 1.6 MG/DL (ref 0.5–1.4)
ERYTHROCYTE [DISTWIDTH] IN BLOOD BY AUTOMATED COUNT: 14.5 % (ref 11.5–14.5)
GFR SERPLBLD CREATININE-BSD FMLA CKD-EPI: 40 ML/MIN/1.73/M2
GLUCOSE SERPL-MCNC: 111 MG/DL (ref 70–110)
HCT VFR BLD AUTO: 34.6 % (ref 40–54)
HGB BLD-MCNC: 11 GM/DL (ref 14–18)
IMM GRANULOCYTES # BLD AUTO: 0.01 K/UL (ref 0–0.04)
MCH RBC QN AUTO: 29.1 PG (ref 27–31)
MCHC RBC AUTO-ENTMCNC: 31.8 G/DL (ref 32–36)
MCV RBC AUTO: 92 FL (ref 82–98)
PLATELET # BLD AUTO: 162 K/UL (ref 150–450)
PMV BLD AUTO: 10.3 FL (ref 9.2–12.9)
POTASSIUM SERPL-SCNC: 4.2 MMOL/L (ref 3.5–5.1)
PROT SERPL-MCNC: 6.8 GM/DL (ref 6–8.4)
RBC # BLD AUTO: 3.78 M/UL (ref 4.6–6.2)
SODIUM SERPL-SCNC: 141 MMOL/L (ref 136–145)
TSH SERPL-ACNC: 1.66 UIU/ML (ref 0.4–4)
WBC # BLD AUTO: 5.79 K/UL (ref 3.9–12.7)

## 2025-06-16 PROCEDURE — 1160F RVW MEDS BY RX/DR IN RCRD: CPT | Mod: CPTII,S$GLB,, | Performed by: REGISTERED NURSE

## 2025-06-16 PROCEDURE — 82040 ASSAY OF SERUM ALBUMIN: CPT

## 2025-06-16 PROCEDURE — 3288F FALL RISK ASSESSMENT DOCD: CPT | Mod: CPTII,S$GLB,, | Performed by: REGISTERED NURSE

## 2025-06-16 PROCEDURE — 84443 ASSAY THYROID STIM HORMONE: CPT

## 2025-06-16 PROCEDURE — 99215 OFFICE O/P EST HI 40 MIN: CPT | Mod: S$GLB,,, | Performed by: REGISTERED NURSE

## 2025-06-16 PROCEDURE — 1125F AMNT PAIN NOTED PAIN PRSNT: CPT | Mod: CPTII,S$GLB,, | Performed by: REGISTERED NURSE

## 2025-06-16 PROCEDURE — 1159F MED LIST DOCD IN RCRD: CPT | Mod: CPTII,S$GLB,, | Performed by: REGISTERED NURSE

## 2025-06-16 PROCEDURE — G2211 COMPLEX E/M VISIT ADD ON: HCPCS | Mod: S$GLB,,, | Performed by: REGISTERED NURSE

## 2025-06-16 PROCEDURE — 99999 PR PBB SHADOW E&M-EST. PATIENT-LVL V: CPT | Mod: PBBFAC,,, | Performed by: REGISTERED NURSE

## 2025-06-16 PROCEDURE — 63600175 PHARM REV CODE 636 W HCPCS: Mod: JZ,TB | Performed by: REGISTERED NURSE

## 2025-06-16 PROCEDURE — 96413 CHEMO IV INFUSION 1 HR: CPT

## 2025-06-16 PROCEDURE — 1101F PT FALLS ASSESS-DOCD LE1/YR: CPT | Mod: CPTII,S$GLB,, | Performed by: REGISTERED NURSE

## 2025-06-16 PROCEDURE — 36415 COLL VENOUS BLD VENIPUNCTURE: CPT

## 2025-06-16 PROCEDURE — 25000003 PHARM REV CODE 250: Performed by: REGISTERED NURSE

## 2025-06-16 PROCEDURE — A4216 STERILE WATER/SALINE, 10 ML: HCPCS | Performed by: REGISTERED NURSE

## 2025-06-16 PROCEDURE — 85027 COMPLETE CBC AUTOMATED: CPT

## 2025-06-16 RX ORDER — HEPARIN 100 UNIT/ML
500 SYRINGE INTRAVENOUS
Status: DISCONTINUED | OUTPATIENT
Start: 2025-06-16 | End: 2025-06-16 | Stop reason: HOSPADM

## 2025-06-16 RX ORDER — DIPHENHYDRAMINE HYDROCHLORIDE 50 MG/ML
50 INJECTION, SOLUTION INTRAMUSCULAR; INTRAVENOUS ONCE AS NEEDED
Status: CANCELLED | OUTPATIENT
Start: 2025-06-16

## 2025-06-16 RX ORDER — IBUPROFEN 800 MG/1
800 TABLET, FILM COATED ORAL EVERY 8 HOURS PRN
Qty: 90 TABLET | Refills: 2 | Status: SHIPPED | OUTPATIENT
Start: 2025-06-16 | End: 2026-06-16

## 2025-06-16 RX ORDER — HEPARIN 100 UNIT/ML
500 SYRINGE INTRAVENOUS
Status: CANCELLED | OUTPATIENT
Start: 2025-06-16

## 2025-06-16 RX ORDER — EPINEPHRINE 0.3 MG/.3ML
0.3 INJECTION SUBCUTANEOUS ONCE AS NEEDED
Status: DISCONTINUED | OUTPATIENT
Start: 2025-06-16 | End: 2025-06-16 | Stop reason: HOSPADM

## 2025-06-16 RX ORDER — SODIUM CHLORIDE 0.9 % (FLUSH) 0.9 %
10 SYRINGE (ML) INJECTION
Status: DISCONTINUED | OUTPATIENT
Start: 2025-06-16 | End: 2025-06-16 | Stop reason: HOSPADM

## 2025-06-16 RX ORDER — DIPHENHYDRAMINE HYDROCHLORIDE 50 MG/ML
50 INJECTION, SOLUTION INTRAMUSCULAR; INTRAVENOUS ONCE AS NEEDED
Status: DISCONTINUED | OUTPATIENT
Start: 2025-06-16 | End: 2025-06-16 | Stop reason: HOSPADM

## 2025-06-16 RX ORDER — EPINEPHRINE 0.3 MG/.3ML
0.3 INJECTION SUBCUTANEOUS ONCE AS NEEDED
Status: CANCELLED | OUTPATIENT
Start: 2025-06-16

## 2025-06-16 RX ORDER — SODIUM CHLORIDE 0.9 % (FLUSH) 0.9 %
10 SYRINGE (ML) INJECTION
Status: CANCELLED | OUTPATIENT
Start: 2025-06-16

## 2025-06-16 RX ORDER — PROCHLORPERAZINE EDISYLATE 5 MG/ML
5 INJECTION INTRAMUSCULAR; INTRAVENOUS ONCE AS NEEDED
Status: CANCELLED
Start: 2025-06-16

## 2025-06-16 RX ORDER — PROCHLORPERAZINE EDISYLATE 5 MG/ML
5 INJECTION INTRAMUSCULAR; INTRAVENOUS ONCE AS NEEDED
Status: DISCONTINUED | OUTPATIENT
Start: 2025-06-16 | End: 2025-06-16 | Stop reason: HOSPADM

## 2025-06-16 RX ADMIN — SODIUM CHLORIDE: 9 INJECTION, SOLUTION INTRAVENOUS at 11:06

## 2025-06-16 RX ADMIN — SODIUM CHLORIDE 400 MG: 9 INJECTION, SOLUTION INTRAVENOUS at 12:06

## 2025-06-16 RX ADMIN — HEPARIN 500 UNITS: 100 SYRINGE at 01:06

## 2025-06-16 RX ADMIN — Medication 10 ML: at 01:06

## 2025-06-16 NOTE — PROGRESS NOTES
MEDICAL ONCOLOGY - ESTABLISHED PATIENT VISIT    Reason for visit: Gastric Adenocarcinoma     Best Contact Phone Number(s): [phone] (home)      Cancer/Stage/TNM:    Cancer Staging   Malignant neoplasm of pyloric antrum  Staging form: Stomach, AJCC 8th Edition  - Clinical stage from 10/20/2023: Stage III (cT3, cN1, cM0) - Signed by Parish Steinberg MD on 11/3/2023       Oncology History   Malignant neoplasm of pyloric antrum   10/20/2023 Cancer Staged    Staging form: Stomach, AJCC 8th Edition  - Clinical stage from 10/20/2023: Stage III (cT3, cN1, cM0)     11/2/2023 Initial Diagnosis    Malignant neoplasm of pyloric antrum     11/21/2023 -  Chemotherapy    Treatment Summary   Plan Name: OP PEMBROLIZUMAB 400MG Q6W  Treatment Goal: Palliative  Status: Active  Start Date: 11/21/2023  End Date: 9/18/2025 (Planned)  Provider: Parish Steinberg MD  Chemotherapy: [No matching medication found in this treatment plan]        History:   89 y.o. male presents for evaluation of newly diagnosed gastric adenocarcinoma. He presented to the ED in September 2023 with weakness and was found to have a Hg of 4.2. He underwent and EGD which revealed an ulcerative mass, biopsies revealed high-grade dysplasia. Had an EUS on 10/20/23 which revealed an ulcerating prepyloric mass (T3, possible N1, M0), biopsy positive for invasive adenocarcinoma. HER2 negative but dMMR (loss of MLH1 and PMS2). NGS and pharmacogenomics were sent. CT CAP on 10/31/23 revealed a circumferential pre-pyloric/pyloric thickening, there are two areas of either extension of the primary mass or enarlged lymph nodes.     Interval History:   Mr Chin returns to the clinic prior to cycle 14 of pembrolizumab for his MSI-H gastric cancer. He is doing well overall. Had increased right hip pain over the weekend with the bad weather, but states better today. No other new pains. No significant nausea or vomiting. Appetite fair, family notes he has been more picky  as of late. No other new concerns.     Patient presents with his daughter and son. ECOG PS is 1.     ROS:   Review of Systems   Constitutional:  Negative for chills and fever.   HENT:  Negative for congestion and sore throat.    Eyes:  Negative for pain.   Respiratory:  Negative for cough and shortness of breath.    Cardiovascular:  Negative for chest pain, palpitations and leg swelling.   Gastrointestinal:  Negative for abdominal pain, diarrhea, heartburn, nausea and vomiting.   Genitourinary:  Negative for dysuria.   Musculoskeletal:  Positive for joint pain (L shoulder) and myalgias. Negative for back pain.   Neurological:  Positive for weakness (left side). Negative for dizziness and headaches.   Psychiatric/Behavioral:  Negative for suicidal ideas. The patient is not nervous/anxious.        Past Medical History:   Past Medical History:   Diagnosis Date    Arthritis     In back, neck    Bell's palsy feb or march 2013    Blood clot in vein     right leg after back surgery    Cataract associated with other syndromes     GERD (gastroesophageal reflux disease)     Hypertension     Other and unspecified hyperlipidemia     Stroke     TIA (transient ischemic attack)     Feb or march 2013        Past Surgical History:   Past Surgical History:   Procedure Laterality Date    APPENDECTOMY      BACK SURGERY      COLONOSCOPY N/A 9/29/2023    Procedure: COLONOSCOPY;  Surgeon: Jan Lozano MD;  Location: [facility];  Service: Gastroenterology;  Laterality: N/A;    ENDOSCOPIC ULTRASOUND OF UPPER GASTROINTESTINAL TRACT N/A 10/20/2023    Procedure: ULTRASOUND, UPPER GI TRACT, ENDOSCOPIC;  Surgeon: Reymundo Rangel MD;  Location: [facility];  Service: Endoscopy;  Laterality: N/A;    ESOPHAGOGASTRODUODENOSCOPY N/A 9/29/2023    Procedure: EGD (ESOPHAGOGASTRODUODENOSCOPY);  Surgeon: Jan Lozano MD;  Location: [facility];  Service: Gastroenterology;  Laterality: N/A;     ESOPHAGOGASTRODUODENOSCOPY N/A 10/20/2023    Procedure: EGD (ESOPHAGOGASTRODUODENOSCOPY);  Surgeon: Reymundo Rangel MD;  Location: [facility];  Service: Endoscopy;  Laterality: N/A;  urgent EGD/EUS (radial) for gastric ulcerated mass (HGD)   Referring: Jan Lozano MD  10/17/23-Pt is no longer on Plavix, H/O CVA with left hemiparesis, instructions via portal-DS    PROSTATE SURGERY          Family History:   Family History   Problem Relation Name Age of Onset    Stroke Father      Hypertension Son          Social History:   Social History     Tobacco Use    Smoking status: Former     Current packs/day: 0.00     Types: Cigarettes     Quit date: 1955     Years since quittin.5    Smokeless tobacco: Not on file   Substance Use Topics    Alcohol use: No        I have reviewed and updated the patient's past medical, surgical, family and social histories.    Allergies:   Review of patient's allergies indicates:   Allergen Reactions    Prednisone         Medications:   Current Outpatient Medications   Medication Sig Dispense Refill    amoxicillin (AMOXIL) 500 MG capsule Take 500 mg by mouth 2 (two) times daily.      atorvastatin (LIPITOR) 20 MG tablet Take 1 tablet (20 mg total) by mouth once daily. 30 tablet 1    bumetanide (BUMEX) 1 MG tablet Take 1 mg by mouth once daily.      docusate sodium (COLACE) 100 MG capsule Take 1 capsule (100 mg total) by mouth 2 (two) times daily as needed (while taking pain medication). 20 capsule 0    hydroCHLOROthiazide (HYDRODIURIL) 25 MG tablet Take 25 mg by mouth once daily.      HYDROcodone-acetaminophen (NORCO) 5-325 mg per tablet Take 1 tablet by mouth every 6 (six) hours as needed for Pain. 120 tablet 0    ibuprofen (ADVIL,MOTRIN) 800 MG tablet Take 1 tablet (800 mg total) by mouth every 8 (eight) hours as needed for Pain. 90 tablet 2    irbesartan (AVAPRO) 300 MG tablet Take 300 mg by mouth once daily.      LIDOcaine-prilocaine (EMLA) cream Place  "to port site 45-60 minutes prior to chemotherapy. 30 g 4    multivitamin-minerals-lutein (MULTIVITAMIN 50 PLUS) Tab Take 1 tablet by mouth once daily.      nebivolol (BYSTOLIC) 10 MG Tab Take 1 tablet (10 mg total) by mouth once daily. 30 tablet 11    omeprazole (PRILOSEC OTC) 20 MG tablet Take 2 tablets (40 mg total) by mouth once daily. 122 tablet 0    oxybutynin (DITROPAN-XL) 10 MG 24 hr tablet Take 10 mg by mouth once daily.      potassium chloride SA (KLOR-CON M20) 20 MEQ tablet Take 1 tablet (20 mEq total) by mouth once daily. 30 tablet 11    promethazine-dextromethorphan (PROMETHAZINE-DM) 6.25-15 mg/5 mL Syrp Take 5 mLs by mouth every 6 (six) hours as needed.      saw palmetto 500 MG capsule Take 500 mg by mouth once daily.      zinc gluconate 50 mg tablet Take 50 mg by mouth once daily.       No current facility-administered medications for this visit.        Physical Exam:   BP (!) 170/74 Comment: took medication before 8am  Pulse 63   Temp 97.7 °F (36.5 °C) (Oral)   Resp 18   Ht 5' 7" (1.702 m)   Wt 89.1 kg (196 lb 6.9 oz)   SpO2 97%   BMI 30.77 kg/m²            Physical Exam  Constitutional:       Appearance: Normal appearance.   HENT:      Head: Normocephalic and atraumatic.      Mouth/Throat:      Mouth: Mucous membranes are moist.      Pharynx: Oropharynx is clear.   Eyes:      Extraocular Movements: Extraocular movements intact.      Pupils: Pupils are equal, round, and reactive to light.   Cardiovascular:      Rate and Rhythm: Normal rate and regular rhythm.      Pulses: Normal pulses.      Heart sounds: Normal heart sounds.   Pulmonary:      Effort: Pulmonary effort is normal.      Breath sounds: Normal breath sounds.   Abdominal:      General: Abdomen is flat.      Palpations: Abdomen is soft.      Tenderness: There is no abdominal tenderness.   Musculoskeletal:         General: Normal range of motion.      Cervical back: Normal range of motion and neck supple.      Right lower leg: " No edema.      Left lower leg: No edema.   Skin:     General: Skin is warm and dry.   Neurological:      General: No focal deficit present.      Mental Status: He is alert and oriented to person, place, and time.      Motor: Weakness (left side slightly weaker than right) present.   Psychiatric:         Mood and Affect: Mood normal.         Behavior: Behavior normal.       Labs:   Recent Results (from the past 48 hours)   Comprehensive Metabolic Panel    Collection Time: 06/16/25  9:58 AM   Result Value Ref Range    Sodium 141 136 - 145 mmol/L    Potassium 4.2 3.5 - 5.1 mmol/L    Chloride 104 95 - 110 mmol/L    CO2 27 23 - 29 mmol/L    Glucose 111 (H) 70 - 110 mg/dL    BUN 37 (H) 10 - 30 mg/dL    Creatinine 1.6 (H) 0.5 - 1.4 mg/dL    Calcium 9.3 8.7 - 10.5 mg/dL    Protein Total 6.8 6.0 - 8.4 gm/dL    Albumin 3.9 3.5 - 5.2 g/dL    Bilirubin Total 0.3 0.1 - 1.0 mg/dL    ALP 59 40 - 150 unit/L    AST 38 11 - 45 unit/L    ALT 32 10 - 44 unit/L    Anion Gap 10 8 - 16 mmol/L    eGFR 40 (L) >60 mL/min/1.73/m2   CBC Oncology    Collection Time: 06/16/25  9:58 AM   Result Value Ref Range    WBC 5.79 3.90 - 12.70 K/uL    RBC 3.78 (L) 4.60 - 6.20 M/uL    HGB 11.0 (L) 14.0 - 18.0 gm/dL    HCT 34.6 (L) 40.0 - 54.0 %    MCV 92 82 - 98 fL    MCH 29.1 27.0 - 31.0 pg    MCHC 31.8 (L) 32.0 - 36.0 g/dL    RDW 14.5 11.5 - 14.5 %    Platelet Count 162 150 - 450 K/uL    MPV 10.3 9.2 - 12.9 fL    Neut # 3.37 1.8 - 7.7 K/uL    Imm Grans # 0.01 0.00 - 0.04 K/uL   TSH    Collection Time: 06/16/25  9:58 AM   Result Value Ref Range    TSH 1.661 0.400 - 4.000 uIU/mL       Imaging:      CT CAP - 5/1/25:    Impression:     History of pre pyloric adenomacarcinoma, no evidence of distal metastases.  Mild apparent gastric wall thickening at the pre pyloric region, could represent residual neoplasm or nondistention.  It is unchanged from the prior study.     Benign hepatic cyst.     Mild prostatomegaly.     Focal 50% stenosis right common iliac  artery due to a noncalcified mural thrombus.    Path:   10/20/23 - Gastric biopsy with invasive adenocarcinoma. Loss of nuclear expression of MLH1 and PMS2.       Assessment:       1. Malignant neoplasm of pyloric antrum    2. Immunodeficiency secondary to neoplasm    3. Iron deficiency anemia due to chronic blood loss    4. Anemia in neoplastic disease    5. Neoplastic (malignant) related fatigue    6. Cerebrovascular accident (CVA), unspecified mechanism    7. Arthralgia of both hands    8. Neoplasm related pain    9. Fatigue, unspecified type       Plan:        # Malignant Neoplasm of Pyloric Antrum   Patient diagnosed with gastric adenocarcinoma on 10/20/23. No signs of metastasis on imaging. Patient is not a candidate for surgery or chemotherapy given his age and co-morbidities. His tumor was MSI-H (loss of nuclear expression of MLH1 and PMS2) so he is a candidate for immunotherapy. Spoke with patient and his family about the risks and benefits of immunotherapy and they are in agreement with starting it.     Began cycle 1 of q6 week Keytruda on 11/21/23.  Tolerating well. Eating well and has a good appetite  Anemia improving.  CT CAP in April showed improvement in the thickening of the gastric antrum and adjacent lymph nodes. Good response, recommend to c/w with Keytruda.  CT CAP in September 2024 showed stable disease.   CT CAP in December 2024 showed stable disease.  CT CAP in May 2025 showed stable disease.    Presents prior to cycle 14.   Lab work reviewed and adequate for treatment   Proceed with cycle of Keytruda 400mg.    - RTC in 6 weeks for cycle 15.    # JOSE JUAN, anemia in neoplastic disease, neoplastic related fatigue  - Secondary to blood loss from gastric mass. Improved.   - Received Injectafer.  - will monitor TSH while receiving Pembro  - BP controlled today.    # CVA, arthralgias  - some residual deficits but remains ambulatory with assistive devices at home.  - Arthralgias unlikely to be  immune-mediated; Will continue NSAIDs and PRN Norco.    # R common iliac artery stenosis  Seen incidentally on 5/1/25 CT.  He is asymptomatic.  Will reach out to vascular surgery to see if any medication change or intervention is indicated. Will let the patient know once we hear back.    Follow up: 6 weeks for cycle 15.     Patient is in agreement with the proposed treatment plan. All questions were answered to the patient's satisfaction. Pt knows to call clinic if anything is needed before the next clinic visit.    Patient discussed with collaborating physician, Dr. Steinberg.    At least 40 minutes were spent today on this encounter including face to face time with the patient, data gathering/interpretation and documentation.       Janine Koehler, MSN, APRN, ACCNS-AG  Hematology and Medical Oncology  Clinical Nurse Specialist to Dr. Steinberg, Dr. Medina & Dr. Ash         code applied: patient requires or will require a continuous, longitudinal, and active collaborative plan of care related to this patient's health condition, gastric cancer --the management of which requires the direction of a practitioner with specialized clinical knowledge, skill, and expertise.     Route Chart for Scheduling    Med Onc Chart Routing      Follow up with physician . 12 weeks with labs for chemo   Follow up with [facility]    Infusion scheduling note   infusion every 6 weeks   Injection scheduling note    Labs CBC, CMP and TSH   Scheduling:  Preferred lab:  Lab interval:     Imaging CT chest abdomen pelvis   prior to MD visit in 6 weeks please   Pharmacy appointment    Other referrals            Treatment Plan Information   OP PEMBROLIZUMAB 400MG Q6W Parish Steinberg MD   Associated diagnosis: Malignant neoplasm of pyloric antrum Stage III cT3, cN1, cM0 noted on 11/2/2023   Line of treatment: First Line  Treatment Goal: Palliative     Upcoming Treatment Dates - OP PEMBROLIZUMAB 400MG Q6W    5/15/2025       Chemotherapy        pembrolizumab (KEYTRUDA) 400 mg in 0.9% NaCl SolP 116 mL infusion  6/26/2025       Chemotherapy       pembrolizumab (KEYTRUDA) 400 mg in 0.9% NaCl SolP 116 mL infusion  8/7/2025       Chemotherapy       pembrolizumab (KEYTRUDA) 400 mg in 0.9% NaCl SolP 116 mL infusion  9/18/2025       Chemotherapy       pembrolizumab (KEYTRUDA) 400 mg in 0.9% NaCl SolP 116 mL infusion    Therapy Plan Information  INJECTAFER (FERRIC CARBOXYMALTOSE) for 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    No therapy plan of the specified type found.

## 2025-06-16 NOTE — PLAN OF CARE
Pt arrived via wheelchair. Accompanied by wife. VS taken. Assessment done. Port assessed, flushed, blood return noted. Infusing NS@25ml/hr while awaiting Keytruda from pharmacy.       Treatment completed. Pt tolerated well. VS obtained. Port deassessed, flushed, heparin locked. Escorted off unit via wheelchair. Accompanied by wife.

## 2025-07-02 ENCOUNTER — TELEPHONE (OUTPATIENT)
Dept: HEMATOLOGY/ONCOLOGY | Facility: CLINIC | Age: OVER 89
End: 2025-07-02
Payer: COMMERCIAL

## 2025-07-25 ENCOUNTER — HOSPITAL ENCOUNTER (OUTPATIENT)
Dept: RADIOLOGY | Facility: HOSPITAL | Age: OVER 89
Discharge: HOME | End: 2025-07-25
Attending: REGISTERED NURSE
Payer: COMMERCIAL

## 2025-07-25 ENCOUNTER — TELEPHONE (OUTPATIENT)
Dept: HEMATOLOGY/ONCOLOGY | Facility: CLINIC | Age: OVER 89
End: 2025-07-25
Payer: COMMERCIAL

## 2025-07-25 DIAGNOSIS — C16.3 MALIGNANT NEOPLASM OF PYLORIC ANTRUM: ICD-10-CM

## 2025-07-25 PROCEDURE — 74177 CT ABD & PELVIS W/CONTRAST: CPT | Mod: 26,,, | Performed by: RADIOLOGY

## 2025-07-25 PROCEDURE — 74177 CT ABD & PELVIS W/CONTRAST: CPT | Mod: TC,PN

## 2025-07-25 PROCEDURE — 25500020 PHARM REV CODE 255: Mod: PN | Performed by: REGISTERED NURSE

## 2025-07-25 PROCEDURE — 71260 CT THORAX DX C+: CPT | Mod: 26,,, | Performed by: RADIOLOGY

## 2025-07-25 RX ADMIN — IOHEXOL 15 ML: 350 INJECTION, SOLUTION INTRAVENOUS at 10:07

## 2025-07-25 RX ADMIN — IOHEXOL 100 ML: 350 INJECTION, SOLUTION INTRAVENOUS at 10:07

## 2025-07-27 NOTE — PROGRESS NOTES
MEDICAL ONCOLOGY - ESTABLISHED PATIENT VISIT    Reason for visit: Gastric Adenocarcinoma     Best Contact Phone Number(s): [phone] (home)      Cancer/Stage/TNM:    Cancer Staging   Malignant neoplasm of pyloric antrum  Staging form: Stomach, AJCC 8th Edition  - Clinical stage from 10/20/2023: Stage III (cT3, cN1, cM0) - Signed by Parish Steinberg MD on 11/3/2023       Oncology History   Malignant neoplasm of pyloric antrum   10/20/2023 Cancer Staged    Staging form: Stomach, AJCC 8th Edition  - Clinical stage from 10/20/2023: Stage III (cT3, cN1, cM0)     11/2/2023 Initial Diagnosis    Malignant neoplasm of pyloric antrum     11/21/2023 -  Chemotherapy    Treatment Summary   Plan Name: OP PEMBROLIZUMAB 400MG Q6W  Treatment Goal: Palliative  Status: Active  Start Date: 11/21/2023  End Date: 9/18/2025 (Planned)  Provider: Parish Steinberg MD  Chemotherapy: [No matching medication found in this treatment plan]        History:   89 y.o. male presents for evaluation of newly diagnosed gastric adenocarcinoma. He presented to the ED in September 2023 with weakness and was found to have a Hg of 4.2. He underwent and EGD which revealed an ulcerative mass, biopsies revealed high-grade dysplasia. Had an EUS on 10/20/23 which revealed an ulcerating prepyloric mass (T3, possible N1, M0), biopsy positive for invasive adenocarcinoma. HER2 negative but dMMR (loss of MLH1 and PMS2). NGS and pharmacogenomics were sent. CT CAP on 10/31/23 revealed a circumferential pre-pyloric/pyloric thickening, there are two areas of either extension of the primary mass or enarlged lymph nodes.     Interval History:   Mr Chin returns to the clinic prior to cycle 15 of pembrolizumab for his MSI-H gastric cancer. He is feeling ok.  Appetite remains relatively poor which he attributes to foods not tasting right.  Has continued right sided back and hip pain. No new pain otherwise.    Patient presents with his daughter and son. ECOG PS  is 1.     ROS:   Review of Systems   Constitutional:  Negative for chills and fever.   HENT:  Negative for congestion and sore throat.    Eyes:  Negative for pain.   Respiratory:  Negative for cough and shortness of breath.    Cardiovascular:  Negative for chest pain, palpitations and leg swelling.   Gastrointestinal:  Negative for abdominal pain, diarrhea, heartburn, nausea and vomiting.   Genitourinary:  Negative for dysuria.   Musculoskeletal:  Positive for joint pain (L shoulder) and myalgias. Negative for back pain.   Neurological:  Positive for weakness (left side). Negative for dizziness and headaches.   Psychiatric/Behavioral:  Negative for suicidal ideas. The patient is not nervous/anxious.        Past Medical History:   Past Medical History:   Diagnosis Date    Arthritis     In back, neck    Bell's palsy feb or march 2013    Blood clot in vein     right leg after back surgery    Cataract associated with other syndromes     GERD (gastroesophageal reflux disease)     Hypertension     Other and unspecified hyperlipidemia     Stroke     TIA (transient ischemic attack)     Feb or march 2013        Past Surgical History:   Past Surgical History:   Procedure Laterality Date    APPENDECTOMY      BACK SURGERY      COLONOSCOPY N/A 9/29/2023    Procedure: COLONOSCOPY;  Surgeon: Jan Lozano MD;  Location: [facility];  Service: Gastroenterology;  Laterality: N/A;    ENDOSCOPIC ULTRASOUND OF UPPER GASTROINTESTINAL TRACT N/A 10/20/2023    Procedure: ULTRASOUND, UPPER GI TRACT, ENDOSCOPIC;  Surgeon: Reymundo Rangel MD;  Location: [facility];  Service: Endoscopy;  Laterality: N/A;    ESOPHAGOGASTRODUODENOSCOPY N/A 9/29/2023    Procedure: EGD (ESOPHAGOGASTRODUODENOSCOPY);  Surgeon: Jan Lozano MD;  Location: [facility];  Service: Gastroenterology;  Laterality: N/A;    ESOPHAGOGASTRODUODENOSCOPY N/A 10/20/2023    Procedure: EGD (ESOPHAGOGASTRODUODENOSCOPY);  Surgeon: Reymundo Rangel MD;   Location: [facility];  Service: Endoscopy;  Laterality: N/A;  urgent EGD/EUS (radial) for gastric ulcerated mass (HGD)   Referring: Jan Lozano MD  10/17/23-Pt is no longer on Plavix, H/O CVA with left hemiparesis, instructions via portal-DS    PROSTATE SURGERY          Family History:   Family History   Problem Relation Name Age of Onset    Stroke Father      Hypertension Son          Social History:   Social History     Tobacco Use    Smoking status: Former     Current packs/day: 0.00     Types: Cigarettes     Quit date: 1955     Years since quittin.6    Smokeless tobacco: Not on file   Substance Use Topics    Alcohol use: No        I have reviewed and updated the patient's past medical, surgical, family and social histories.    Allergies:   Review of patient's allergies indicates:   Allergen Reactions    Prednisone         Medications:   Current Outpatient Medications   Medication Sig Dispense Refill    amoxicillin (AMOXIL) 500 MG capsule Take 500 mg by mouth 2 (two) times daily.      bumetanide (BUMEX) 1 MG tablet Take 1 mg by mouth once daily.      docusate sodium (COLACE) 100 MG capsule Take 1 capsule (100 mg total) by mouth 2 (two) times daily as needed (while taking pain medication). 20 capsule 0    hydroCHLOROthiazide (HYDRODIURIL) 25 MG tablet Take 25 mg by mouth once daily.      irbesartan (AVAPRO) 300 MG tablet Take 300 mg by mouth once daily.      LIDOcaine-prilocaine (EMLA) cream Place to port site 45-60 minutes prior to chemotherapy. 30 g 4    multivitamin-minerals-lutein (MULTIVITAMIN 50 PLUS) Tab Take 1 tablet by mouth once daily.      nebivolol (BYSTOLIC) 10 MG Tab Take 1 tablet (10 mg total) by mouth once daily. 30 tablet 11    oxybutynin (DITROPAN-XL) 10 MG 24 hr tablet Take 10 mg by mouth once daily.      potassium chloride SA (KLOR-CON M20) 20 MEQ tablet Take 1 tablet (20 mEq total) by mouth once daily. 30 tablet 11    promethazine-dextromethorphan (PROMETHAZINE-DM) 6.25-15  "mg/5 mL Syrp Take 5 mLs by mouth every 6 (six) hours as needed.      saw palmetto 500 MG capsule Take 500 mg by mouth once daily.      zinc gluconate 50 mg tablet Take 50 mg by mouth once daily.      atorvastatin (LIPITOR) 20 MG tablet Take 1 tablet (20 mg total) by mouth once daily. 30 tablet 1    HYDROcodone-acetaminophen (NORCO) 5-325 mg per tablet Take 1 tablet by mouth every 6 (six) hours as needed for Pain. 120 tablet 0    ibuprofen (ADVIL,MOTRIN) 800 MG tablet Take 1 tablet (800 mg total) by mouth every 8 (eight) hours as needed for Pain. 90 tablet 2    omeprazole (PRILOSEC OTC) 20 MG tablet Take 2 tablets (40 mg total) by mouth once daily. 122 tablet 0     No current facility-administered medications for this visit.        Physical Exam:   BP (!) 168/68 (BP Location: Left arm, Patient Position: Sitting)   Pulse 60   Temp 98.6 °F (37 °C) (Temporal)   Resp 18   Ht 5' 7" (1.702 m)   Wt 89.8 kg (198 lb 1.3 oz)   SpO2 97%   BMI 31.02 kg/m²            Physical Exam  Constitutional:       Appearance: Normal appearance.   HENT:      Head: Normocephalic and atraumatic.      Mouth/Throat:      Mouth: Mucous membranes are moist.      Pharynx: Oropharynx is clear.   Eyes:      Extraocular Movements: Extraocular movements intact.      Pupils: Pupils are equal, round, and reactive to light.   Cardiovascular:      Rate and Rhythm: Normal rate and regular rhythm.      Pulses: Normal pulses.      Heart sounds: Normal heart sounds.   Pulmonary:      Effort: Pulmonary effort is normal.      Breath sounds: Normal breath sounds.   Abdominal:      General: Abdomen is flat.      Palpations: Abdomen is soft.      Tenderness: There is no abdominal tenderness.   Musculoskeletal:         General: Normal range of motion.      Cervical back: Normal range of motion and neck supple.      Right lower leg: No edema.      Left lower leg: No edema.   Skin:     General: Skin is warm and dry.   Neurological:      General: No focal " deficit present.      Mental Status: He is alert and oriented to person, place, and time.      Motor: Weakness (left side slightly weaker than right) present.   Psychiatric:         Mood and Affect: Mood normal.         Behavior: Behavior normal.         Labs:   No results found for this or any previous visit (from the past 48 hours).      Imaging:      CT CAP - 7/25/25:    Impression:     No evidence of thoracic or abdominal metastatic disease    Path:   10/20/23 - Gastric biopsy with invasive adenocarcinoma. Loss of nuclear expression of MLH1 and PMS2.       Assessment:       1. Malignant neoplasm of pyloric antrum    2. Immunodeficiency secondary to neoplasm    3. Iron deficiency anemia due to chronic blood loss    4. Anemia in neoplastic disease    5. Neoplastic (malignant) related fatigue    6. Cerebrovascular accident (CVA), unspecified mechanism    7. Arthralgia of both hands    8. Neoplasm related pain         Plan:        # Malignant Neoplasm of Pyloric Antrum   Patient diagnosed with gastric adenocarcinoma on 10/20/23. No signs of metastasis on imaging. Patient is not a candidate for surgery or chemotherapy given his age and co-morbidities. His tumor was MSI-H (loss of nuclear expression of MLH1 and PMS2) so he is a candidate for immunotherapy. Spoke with patient and his family about the risks and benefits of immunotherapy and they are in agreement with starting it.     Began cycle 1 of q6 week Keytruda on 11/21/23.  Tolerating well. Eating well and has a good appetite  Anemia improving.  CT CAP in April showed improvement in the thickening of the gastric antrum and adjacent lymph nodes. Good response, recommend to c/w with Keytruda.  CT CAP in September 2024 showed stable disease.   CT CAP in December 2024 showed stable disease.  CT CAP in May 2025 showed stable disease.  CT CAP in July 2025 showed stable disease.    Presents prior to cycle 15.   Lab work reviewed and adequate for treatment   Proceed with  cycle of Keytruda 400mg.    - RTC in 6 weeks for cycle 16.    # JOSE JUAN, anemia in neoplastic disease, neoplastic related fatigue  - Secondary to blood loss from gastric mass. Improved.   - Received Injectafer.  - will monitor TSH while receiving Pembro  - BP elevated today.    # CVA, arthralgias  - some residual deficits but remains ambulatory with assistive devices at home.  - Arthralgias unlikely to be immune-mediated; Will continue NSAIDs and PRN Norco.    # R common iliac artery stenosis  Seen incidentally on 5/1/25 CT.  He is asymptomatic.  No intervention indicated per vascular surgery.    Follow up: 6 weeks for cycle 16.     Patient is in agreement with the proposed treatment plan. All questions were answered to the patient's satisfaction. Pt knows to call clinic if anything is needed before the next clinic visit.    Parish Steinebrg MD  Hematology/Oncology  [facility]           code applied: patient requires or will require a continuous, longitudinal, and active collaborative plan of care related to this patient's health condition, gastric cancer --the management of which requires the direction of a practitioner with specialized clinical knowledge, skill, and expertise.     Route Chart for Scheduling    Med Onc Chart Routing      Follow up with physician 3 months. for keytruda   Follow up with [facility] 6 weeks. for keytruda   Infusion scheduling note    Injection scheduling note    Labs CBC, CMP and TSH   Scheduling:  Preferred lab:  Lab interval:     Imaging    Pharmacy appointment    Other referrals            Treatment Plan Information   OP PEMBROLIZUMAB 400MG Q6W Parish Steinberg MD   Associated diagnosis: Malignant neoplasm of pyloric antrum Stage III cT3, cN1, cM0 noted on 11/2/2023   Line of treatment: First Line  Treatment Goal: Palliative     Upcoming Treatment Dates - OP PEMBROLIZUMAB 400MG Q6W    6/26/2025       Chemotherapy       pembrolizumab (KEYTRUDA) 400 mg in 0.9%  NaCl SolP 116 mL infusion  8/7/2025       Chemotherapy       pembrolizumab (KEYTRUDA) 400 mg in 0.9% NaCl SolP 116 mL infusion  9/18/2025       Chemotherapy       pembrolizumab (KEYTRUDA) 400 mg in 0.9% NaCl SolP 116 mL infusion    Therapy Plan Information  INJECTAFER (FERRIC CARBOXYMALTOSE) for 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    No therapy plan of the specified type found.

## 2025-07-28 ENCOUNTER — LAB VISIT (OUTPATIENT)
Dept: LAB | Facility: HOSPITAL | Age: OVER 89
End: 2025-07-28
Payer: COMMERCIAL

## 2025-07-28 ENCOUNTER — OFFICE VISIT (OUTPATIENT)
Dept: HEMATOLOGY/ONCOLOGY | Facility: CLINIC | Age: OVER 89
End: 2025-07-28
Payer: COMMERCIAL

## 2025-07-28 ENCOUNTER — INFUSION (OUTPATIENT)
Dept: INFUSION THERAPY | Facility: HOSPITAL | Age: OVER 89
End: 2025-07-28
Payer: COMMERCIAL

## 2025-07-28 VITALS
HEIGHT: 67 IN | WEIGHT: 198.06 LBS | SYSTOLIC BLOOD PRESSURE: 168 MMHG | TEMPERATURE: 99 F | OXYGEN SATURATION: 97 % | BODY MASS INDEX: 31.09 KG/M2 | DIASTOLIC BLOOD PRESSURE: 68 MMHG | RESPIRATION RATE: 18 BRPM | HEART RATE: 60 BPM

## 2025-07-28 VITALS
OXYGEN SATURATION: 96 % | DIASTOLIC BLOOD PRESSURE: 71 MMHG | HEART RATE: 57 BPM | RESPIRATION RATE: 18 BRPM | SYSTOLIC BLOOD PRESSURE: 158 MMHG

## 2025-07-28 DIAGNOSIS — I63.9 CEREBROVASCULAR ACCIDENT (CVA), UNSPECIFIED MECHANISM: ICD-10-CM

## 2025-07-28 DIAGNOSIS — D63.0 ANEMIA IN NEOPLASTIC DISEASE: ICD-10-CM

## 2025-07-28 DIAGNOSIS — D84.81 IMMUNODEFICIENCY SECONDARY TO NEOPLASM: ICD-10-CM

## 2025-07-28 DIAGNOSIS — C16.3 MALIGNANT NEOPLASM OF PYLORIC ANTRUM: Primary | ICD-10-CM

## 2025-07-28 DIAGNOSIS — D50.0 IRON DEFICIENCY ANEMIA DUE TO CHRONIC BLOOD LOSS: ICD-10-CM

## 2025-07-28 DIAGNOSIS — G89.3 NEOPLASM RELATED PAIN: ICD-10-CM

## 2025-07-28 DIAGNOSIS — D49.9 IMMUNODEFICIENCY SECONDARY TO NEOPLASM: ICD-10-CM

## 2025-07-28 DIAGNOSIS — M25.541 ARTHRALGIA OF BOTH HANDS: ICD-10-CM

## 2025-07-28 DIAGNOSIS — R53.0 NEOPLASTIC (MALIGNANT) RELATED FATIGUE: ICD-10-CM

## 2025-07-28 DIAGNOSIS — M25.542 ARTHRALGIA OF BOTH HANDS: ICD-10-CM

## 2025-07-28 DIAGNOSIS — R53.83 FATIGUE, UNSPECIFIED TYPE: ICD-10-CM

## 2025-07-28 DIAGNOSIS — C16.3 MALIGNANT NEOPLASM OF PYLORIC ANTRUM: ICD-10-CM

## 2025-07-28 LAB
ABSOLUTE NEUTROPHIL COUNT (OHS): 2.82 K/UL (ref 1.8–7.7)
ALBUMIN SERPL BCP-MCNC: 3.9 G/DL (ref 3.5–5.2)
ALP SERPL-CCNC: 57 UNIT/L (ref 40–150)
ALT SERPL W/O P-5'-P-CCNC: 33 UNIT/L (ref 0–55)
ANION GAP (OHS): 11 MMOL/L (ref 8–16)
AST SERPL-CCNC: 47 UNIT/L (ref 0–50)
BILIRUB SERPL-MCNC: 0.3 MG/DL (ref 0.1–1)
BUN SERPL-MCNC: 33 MG/DL (ref 10–30)
CALCIUM SERPL-MCNC: 9 MG/DL (ref 8.7–10.5)
CHLORIDE SERPL-SCNC: 105 MMOL/L (ref 95–110)
CO2 SERPL-SCNC: 23 MMOL/L (ref 23–29)
CREAT SERPL-MCNC: 1.7 MG/DL (ref 0.5–1.4)
ERYTHROCYTE [DISTWIDTH] IN BLOOD BY AUTOMATED COUNT: 14.6 % (ref 11.5–14.5)
GFR SERPLBLD CREATININE-BSD FMLA CKD-EPI: 37 ML/MIN/1.73/M2
GLUCOSE SERPL-MCNC: 97 MG/DL (ref 70–110)
HCT VFR BLD AUTO: 32.4 % (ref 40–54)
HGB BLD-MCNC: 10.5 GM/DL (ref 14–18)
IMM GRANULOCYTES # BLD AUTO: 0.02 K/UL (ref 0–0.04)
MCH RBC QN AUTO: 29.5 PG (ref 27–31)
MCHC RBC AUTO-ENTMCNC: 32.4 G/DL (ref 32–36)
MCV RBC AUTO: 91 FL (ref 82–98)
PLATELET # BLD AUTO: 163 K/UL (ref 150–450)
PMV BLD AUTO: 10.1 FL (ref 9.2–12.9)
POTASSIUM SERPL-SCNC: 4 MMOL/L (ref 3.5–5.1)
PROT SERPL-MCNC: 7.1 GM/DL (ref 6–8.4)
RBC # BLD AUTO: 3.56 M/UL (ref 4.6–6.2)
SODIUM SERPL-SCNC: 139 MMOL/L (ref 136–145)
TSH SERPL-ACNC: 2.94 UIU/ML (ref 0.4–4)
WBC # BLD AUTO: 4.71 K/UL (ref 3.9–12.7)

## 2025-07-28 PROCEDURE — 36415 COLL VENOUS BLD VENIPUNCTURE: CPT

## 2025-07-28 PROCEDURE — 85027 COMPLETE CBC AUTOMATED: CPT

## 2025-07-28 PROCEDURE — 1159F MED LIST DOCD IN RCRD: CPT | Mod: CPTII,S$GLB,, | Performed by: INTERNAL MEDICINE

## 2025-07-28 PROCEDURE — 25000003 PHARM REV CODE 250: Performed by: INTERNAL MEDICINE

## 2025-07-28 PROCEDURE — 1101F PT FALLS ASSESS-DOCD LE1/YR: CPT | Mod: CPTII,S$GLB,, | Performed by: INTERNAL MEDICINE

## 2025-07-28 PROCEDURE — 84075 ASSAY ALKALINE PHOSPHATASE: CPT

## 2025-07-28 PROCEDURE — 1125F AMNT PAIN NOTED PAIN PRSNT: CPT | Mod: CPTII,S$GLB,, | Performed by: INTERNAL MEDICINE

## 2025-07-28 PROCEDURE — 3288F FALL RISK ASSESSMENT DOCD: CPT | Mod: CPTII,S$GLB,, | Performed by: INTERNAL MEDICINE

## 2025-07-28 PROCEDURE — A4216 STERILE WATER/SALINE, 10 ML: HCPCS | Performed by: INTERNAL MEDICINE

## 2025-07-28 PROCEDURE — G2211 COMPLEX E/M VISIT ADD ON: HCPCS | Mod: S$GLB,,, | Performed by: INTERNAL MEDICINE

## 2025-07-28 PROCEDURE — 63600175 PHARM REV CODE 636 W HCPCS: Performed by: INTERNAL MEDICINE

## 2025-07-28 PROCEDURE — 84443 ASSAY THYROID STIM HORMONE: CPT

## 2025-07-28 PROCEDURE — 99215 OFFICE O/P EST HI 40 MIN: CPT | Mod: S$GLB,,, | Performed by: INTERNAL MEDICINE

## 2025-07-28 PROCEDURE — 99999 PR PBB SHADOW E&M-EST. PATIENT-LVL IV: CPT | Mod: PBBFAC,,, | Performed by: INTERNAL MEDICINE

## 2025-07-28 PROCEDURE — 96413 CHEMO IV INFUSION 1 HR: CPT

## 2025-07-28 RX ORDER — PROCHLORPERAZINE EDISYLATE 5 MG/ML
5 INJECTION INTRAMUSCULAR; INTRAVENOUS ONCE AS NEEDED
Status: DISCONTINUED | OUTPATIENT
Start: 2025-07-28 | End: 2025-07-28 | Stop reason: HOSPADM

## 2025-07-28 RX ORDER — DIPHENHYDRAMINE HYDROCHLORIDE 50 MG/ML
50 INJECTION, SOLUTION INTRAMUSCULAR; INTRAVENOUS ONCE AS NEEDED
Status: DISCONTINUED | OUTPATIENT
Start: 2025-07-28 | End: 2025-07-28 | Stop reason: HOSPADM

## 2025-07-28 RX ORDER — HEPARIN 100 UNIT/ML
500 SYRINGE INTRAVENOUS
Status: CANCELLED | OUTPATIENT
Start: 2025-07-28

## 2025-07-28 RX ORDER — SODIUM CHLORIDE 0.9 % (FLUSH) 0.9 %
10 SYRINGE (ML) INJECTION
Status: DISCONTINUED | OUTPATIENT
Start: 2025-07-28 | End: 2025-07-28 | Stop reason: HOSPADM

## 2025-07-28 RX ORDER — EPINEPHRINE 0.3 MG/.3ML
0.3 INJECTION SUBCUTANEOUS ONCE AS NEEDED
Status: DISCONTINUED | OUTPATIENT
Start: 2025-07-28 | End: 2025-07-28 | Stop reason: HOSPADM

## 2025-07-28 RX ORDER — HYDROCODONE BITARTRATE AND ACETAMINOPHEN 5; 325 MG/1; MG/1
1 TABLET ORAL EVERY 6 HOURS PRN
Qty: 120 TABLET | Refills: 0 | Status: SHIPPED | OUTPATIENT
Start: 2025-07-28

## 2025-07-28 RX ORDER — IBUPROFEN 800 MG/1
800 TABLET, FILM COATED ORAL EVERY 8 HOURS PRN
Qty: 90 TABLET | Refills: 2 | Status: SHIPPED | OUTPATIENT
Start: 2025-07-28 | End: 2026-07-28

## 2025-07-28 RX ORDER — SODIUM CHLORIDE 0.9 % (FLUSH) 0.9 %
10 SYRINGE (ML) INJECTION
Status: CANCELLED | OUTPATIENT
Start: 2025-07-28

## 2025-07-28 RX ORDER — PROCHLORPERAZINE EDISYLATE 5 MG/ML
5 INJECTION INTRAMUSCULAR; INTRAVENOUS ONCE AS NEEDED
Status: CANCELLED
Start: 2025-07-28

## 2025-07-28 RX ORDER — HEPARIN 100 UNIT/ML
500 SYRINGE INTRAVENOUS
Status: DISCONTINUED | OUTPATIENT
Start: 2025-07-28 | End: 2025-07-28 | Stop reason: HOSPADM

## 2025-07-28 RX ORDER — EPINEPHRINE 0.3 MG/.3ML
0.3 INJECTION SUBCUTANEOUS ONCE AS NEEDED
Status: CANCELLED | OUTPATIENT
Start: 2025-07-28

## 2025-07-28 RX ORDER — DIPHENHYDRAMINE HYDROCHLORIDE 50 MG/ML
50 INJECTION, SOLUTION INTRAMUSCULAR; INTRAVENOUS ONCE AS NEEDED
Status: CANCELLED | OUTPATIENT
Start: 2025-07-28

## 2025-07-28 RX ADMIN — SODIUM CHLORIDE 400 MG: 9 INJECTION, SOLUTION INTRAVENOUS at 10:07

## 2025-07-28 RX ADMIN — HEPARIN 500 UNITS: 100 SYRINGE at 11:07

## 2025-07-28 RX ADMIN — Medication 10 ML: at 11:07

## 2025-07-28 RX ADMIN — SODIUM CHLORIDE: 9 INJECTION, SOLUTION INTRAVENOUS at 09:07

## 2025-07-28 NOTE — PLAN OF CARE
Pt arrived to via wheelchair. Accompanied by son. VS obtained. Assessment done. Port assessed, flushed, blood return noted. Infusing NS@25ml/hr while awaiting Keytruda from pharmacy. Will monitor throughout treatment.         Treatment completed. Pt tolerated Keytruda well. VS taken. Port deassessed, flushed, heparin locked. Pt escorted off unit via wheelchair by son.

## 2025-09-05 ENCOUNTER — TELEPHONE (OUTPATIENT)
Dept: HEMATOLOGY/ONCOLOGY | Facility: CLINIC | Age: OVER 89
End: 2025-09-05
Payer: COMMERCIAL